# Patient Record
Sex: MALE | Race: WHITE | NOT HISPANIC OR LATINO | Employment: OTHER | URBAN - NONMETROPOLITAN AREA
[De-identification: names, ages, dates, MRNs, and addresses within clinical notes are randomized per-mention and may not be internally consistent; named-entity substitution may affect disease eponyms.]

---

## 2018-11-09 LAB
ALBUMIN SERPL-MCNC: 4 G/DL (ref 3.5–5.2)
ALP BLD-CCNC: 48 U/L (ref 40–130)
ALT SERPL-CCNC: 21 U/L (ref 5–41)
ANION GAP SERPL CALCULATED.3IONS-SCNC: 11 MMOL/L (ref 7–19)
AST SERPL-CCNC: 20 U/L (ref 5–40)
BACTERIA: NEGATIVE /HPF
BASOPHILS ABSOLUTE: 0.1 K/UL (ref 0–0.2)
BASOPHILS RELATIVE PERCENT: 1.1 % (ref 0–1)
BILIRUB SERPL-MCNC: 0.7 MG/DL (ref 0.2–1.2)
BILIRUBIN URINE: NEGATIVE
BLOOD, URINE: ABNORMAL
BUN BLDV-MCNC: 23 MG/DL (ref 8–23)
CALCIUM SERPL-MCNC: 10.4 MG/DL (ref 8.8–10.2)
CHLORIDE BLD-SCNC: 100 MMOL/L (ref 98–111)
CHOLESTEROL, TOTAL: 138 MG/DL (ref 160–199)
CLARITY: CLEAR
CO2: 30 MMOL/L (ref 22–29)
COLOR: YELLOW
CREAT SERPL-MCNC: 1.1 MG/DL (ref 0.5–1.2)
EOSINOPHILS ABSOLUTE: 0.2 K/UL (ref 0–0.6)
EOSINOPHILS RELATIVE PERCENT: 3.4 % (ref 0–5)
EPITHELIAL CELLS, UA: 1 /HPF (ref 0–5)
GFR NON-AFRICAN AMERICAN: >60
GLUCOSE BLD-MCNC: 149 MG/DL (ref 74–109)
GLUCOSE URINE: >=1000 MG/DL
HBA1C MFR BLD: 8.2 % (ref 4–6)
HCT VFR BLD CALC: 48.1 % (ref 42–52)
HDLC SERPL-MCNC: 40 MG/DL (ref 55–121)
HEMOGLOBIN: 15.3 G/DL (ref 14–18)
HYALINE CASTS: 1 /HPF (ref 0–8)
KETONES, URINE: NEGATIVE MG/DL
LDL CHOLESTEROL CALCULATED: 69 MG/DL
LEUKOCYTE ESTERASE, URINE: NEGATIVE
LYMPHOCYTES ABSOLUTE: 1.9 K/UL (ref 1.1–4.5)
LYMPHOCYTES RELATIVE PERCENT: 33.8 % (ref 20–40)
MCH RBC QN AUTO: 29 PG (ref 27–31)
MCHC RBC AUTO-ENTMCNC: 31.8 G/DL (ref 33–37)
MCV RBC AUTO: 91.3 FL (ref 80–94)
MONOCYTES ABSOLUTE: 0.7 K/UL (ref 0–0.9)
MONOCYTES RELATIVE PERCENT: 12.6 % (ref 0–10)
NEUTROPHILS ABSOLUTE: 2.7 K/UL (ref 1.5–7.5)
NEUTROPHILS RELATIVE PERCENT: 48.9 % (ref 50–65)
NITRITE, URINE: NEGATIVE
PDW BLD-RTO: 13.9 % (ref 11.5–14.5)
PH UA: 7
PLATELET # BLD: 285 K/UL (ref 130–400)
PMV BLD AUTO: 9.4 FL (ref 9.4–12.4)
POTASSIUM SERPL-SCNC: 4.5 MMOL/L (ref 3.5–5)
PROTEIN UA: NEGATIVE MG/DL
RBC # BLD: 5.27 M/UL (ref 4.7–6.1)
RBC UA: 7 /HPF (ref 0–4)
SODIUM BLD-SCNC: 141 MMOL/L (ref 136–145)
SPECIFIC GRAVITY UA: 1.03
TOTAL PROTEIN: 7.4 G/DL (ref 6.6–8.7)
TRIGL SERPL-MCNC: 147 MG/DL (ref 0–149)
TSH SERPL DL<=0.05 MIU/L-ACNC: 3.84 UIU/ML (ref 0.27–4.2)
URIC ACID, SERUM: 4.7 MG/DL (ref 3.4–7)
UROBILINOGEN, URINE: 0.2 E.U./DL
WBC # BLD: 5.6 K/UL (ref 4.8–10.8)
WBC UA: 22 /HPF (ref 0–5)

## 2019-09-11 RX ORDER — LEVOTHYROXINE SODIUM 0.05 MG/1
50 TABLET ORAL DAILY
COMMUNITY
End: 2020-08-19 | Stop reason: SDUPTHER

## 2019-09-11 RX ORDER — GLIPIZIDE 5 MG/1
5 TABLET ORAL
COMMUNITY
End: 2020-08-13 | Stop reason: SDUPTHER

## 2019-09-11 RX ORDER — ASPIRIN 81 MG/1
81 TABLET ORAL DAILY
COMMUNITY

## 2019-09-11 RX ORDER — MULTIPLE VITAMINS W/ MINERALS TAB 9MG-400MCG
1 TAB ORAL DAILY
COMMUNITY

## 2019-09-11 RX ORDER — DUTASTERIDE 0.5 MG/1
0.5 CAPSULE, LIQUID FILLED ORAL DAILY
COMMUNITY

## 2019-09-11 RX ORDER — FUROSEMIDE 20 MG/1
20 TABLET ORAL EVERY OTHER DAY
COMMUNITY
End: 2020-09-15 | Stop reason: SDUPTHER

## 2019-09-11 RX ORDER — CHLORAL HYDRATE 500 MG
1000 CAPSULE ORAL
COMMUNITY

## 2019-09-11 RX ORDER — ATENOLOL 50 MG/1
50 TABLET ORAL DAILY
COMMUNITY
End: 2020-08-19 | Stop reason: SDUPTHER

## 2019-09-11 RX ORDER — AMPICILLIN TRIHYDRATE 250 MG
1000 CAPSULE ORAL DAILY
COMMUNITY

## 2019-09-11 RX ORDER — GABAPENTIN 100 MG/1
100 CAPSULE ORAL 3 TIMES DAILY
COMMUNITY
End: 2020-02-18 | Stop reason: SDUPTHER

## 2019-09-11 RX ORDER — TAMSULOSIN HYDROCHLORIDE 0.4 MG/1
1 CAPSULE ORAL 2 TIMES DAILY
COMMUNITY

## 2019-09-11 RX ORDER — AMLODIPINE BESYLATE 2.5 MG/1
2.5 TABLET ORAL DAILY
COMMUNITY
End: 2021-01-14 | Stop reason: SDUPTHER

## 2019-09-11 RX ORDER — ATORVASTATIN CALCIUM 20 MG/1
20 TABLET, FILM COATED ORAL DAILY
COMMUNITY
End: 2020-10-05 | Stop reason: SDUPTHER

## 2019-09-16 ENCOUNTER — OFFICE VISIT (OUTPATIENT)
Dept: CARDIOLOGY | Facility: CLINIC | Age: 84
End: 2019-09-16

## 2019-09-16 VITALS
HEIGHT: 74 IN | BODY MASS INDEX: 27.46 KG/M2 | WEIGHT: 214 LBS | OXYGEN SATURATION: 98 % | SYSTOLIC BLOOD PRESSURE: 134 MMHG | DIASTOLIC BLOOD PRESSURE: 80 MMHG | HEART RATE: 67 BPM

## 2019-09-16 DIAGNOSIS — E78.2 MIXED HYPERLIPIDEMIA: ICD-10-CM

## 2019-09-16 DIAGNOSIS — I10 ESSENTIAL HYPERTENSION: ICD-10-CM

## 2019-09-16 DIAGNOSIS — I25.10 CORONARY ARTERY DISEASE INVOLVING NATIVE CORONARY ARTERY OF NATIVE HEART WITHOUT ANGINA PECTORIS: Primary | ICD-10-CM

## 2019-09-16 PROBLEM — E78.5 HYPERLIPIDEMIA: Status: ACTIVE | Noted: 2019-09-16

## 2019-09-16 PROBLEM — I70.90 ARTERIOLOSCLEROSIS: Status: ACTIVE | Noted: 2019-09-16

## 2019-09-16 PROCEDURE — 99204 OFFICE O/P NEW MOD 45 MIN: CPT | Performed by: INTERNAL MEDICINE

## 2019-09-16 PROCEDURE — 93000 ELECTROCARDIOGRAM COMPLETE: CPT | Performed by: INTERNAL MEDICINE

## 2019-09-16 NOTE — PROGRESS NOTES
Subjective:     Encounter Date:09/16/2019      Patient ID: Sarkis Naranjo is a 83 y.o. male with coronary artery disease, status post remote history of PCI to the diagonal branch, also with hypertension, hyperlipidemia, type 2 diabetes mellitus who presents today to establish care.    Referring Provider: Meño Harris MD  Reason for Referral: Establish care - history of coronary artery disease    Chief Complaint: Establish care    Coronary Artery Disease   Presents for initial visit. The disease course has been stable. Pertinent negatives include no chest pain, dizziness, leg swelling, palpitations or shortness of breath. Risk factors include diabetes, hyperlipidemia and hypertension. Past treatments include aspirin, beta blockers, statins and calcium channel blockers. The treatment provided significant relief. Compliance with prior treatments has been good. There is no history of arrhythmia, CHF or valvular heart disease. Past surgical history includes cardiac stents. Past surgical history does not include CABG.      This is an 83-year-old male with coronary artery disease, status post remote history of PCI to the diagonal branch, also with hypertension, hyperlipidemia, diabetes mellitus who presents today to establish care.  Previous, the patient was followed by Dr. John Broadbent.  Patient says that he has been doing well for quite some time.  After his PCI a number of years ago, he had 2 separate heart catheterizations, both of which did not require any further interventions.  He remains chest pain-free at this time.  He remains physically active with no significant limitations.  No significant shortness of breath or dyspnea on exertion.  No palpitations, lightheadedness, dizziness, syncope.  He reports that his blood pressure is generally well controlled as well as his cholesterol well controlled.  He has not had any side effects or problems with his medications.    The following portions of the  patient's history were reviewed and updated as appropriate: allergies, current medications, past family history, past medical history, past social history, past surgical history and problem list.     Past Medical History:   Diagnosis Date   • Arteriolosclerosis    • Diabetes mellitus (CMS/HCC)    • Hyperlipidemia    • Hypertension    • Myocardial infarction (CMS/HCC)    • Neuropathy    • Tachycardia    • Urothelial carcinoma (CMS/HCC)      Past Surgical History:   Procedure Laterality Date   • CARDIAC CATHETERIZATION     • CARPAL TUNNEL RELEASE Bilateral    • CATARACT EXTRACTION, BILATERAL Bilateral    • HERNIA REPAIR     • TOTAL HIP ARTHROPLASTY Bilateral    • TRANSURETHRAL RESECTION OF BLADDER  09/24/2018       Current Outpatient Medications:   •  amLODIPine (NORVASC) 2.5 MG tablet, Take 2.5 mg by mouth Daily., Disp: , Rfl:   •  aspirin 81 MG EC tablet, Take 81 mg by mouth Daily., Disp: , Rfl:   •  atenolol (TENORMIN) 50 MG tablet, Take 50 mg by mouth Daily., Disp: , Rfl:   •  atorvastatin (LIPITOR) 20 MG tablet, Take 20 mg by mouth Daily., Disp: , Rfl:   •  Calcium Carb-Cholecalciferol 250-125 MG-UNIT tablet, Take 1 tablet by mouth Daily., Disp: , Rfl:   •  Canagliflozin (INVOKANA) 300 MG tablet, Take 300 mg by mouth Daily., Disp: , Rfl:   •  Cinnamon 500 MG capsule, Take 1,000 mg by mouth Daily., Disp: , Rfl:   •  dutasteride (AVODART) 0.5 MG capsule, Take 0.5 mg by mouth Daily., Disp: , Rfl:   •  furosemide (LASIX) 20 MG tablet, Take 20 mg by mouth Every Other Day., Disp: , Rfl:   •  gabapentin (NEURONTIN) 100 MG capsule, Take 100 mg by mouth 3 (Three) Times a Day. 1 TAB IN AM & 2 AT HS, Disp: , Rfl:   •  glipiZIDE (GLUCOTROL) 5 MG tablet, Take 5 mg by mouth 2 (Two) Times a Day Before Meals. 2 tabs AM 1 tab PM, Disp: , Rfl:   •  levothyroxine (SYNTHROID, LEVOTHROID) 50 MCG tablet, Take 50 mcg by mouth Daily., Disp: , Rfl:   •  Liraglutide (VICTOZA) 18 MG/3ML solution pen-injector injection, Inject 1.8 mg under  the skin into the appropriate area as directed Daily., Disp: , Rfl:   •  metFORMIN (GLUCOPHAGE) 1000 MG tablet, Take 1,000 mg by mouth 2 (Two) Times a Day With Meals., Disp: , Rfl:   •  Multiple Vitamins-Minerals (MULTIVITAMIN WITH MINERALS) tablet tablet, Take 1 tablet by mouth Daily., Disp: , Rfl:   •  Omega-3 Fatty Acids (FISH OIL) 1000 MG capsule capsule, Take 1,000 mg by mouth Daily With Breakfast., Disp: , Rfl:   •  tamsulosin (FLOMAX) 0.4 MG capsule 24 hr capsule, Take 1 capsule by mouth 2 (Two) Times a Day., Disp: , Rfl:     No Known Allergies    Social History     Tobacco Use   • Smoking status: Never Smoker   • Smokeless tobacco: Never Used   Substance Use Topics   • Alcohol use: No     Frequency: Never     Family History   Problem Relation Age of Onset   • Cancer Mother         brain tumor   • Hypertension Father    • Stroke Paternal Grandfather      Review of Systems   Constitution: Negative for chills, fever, night sweats and weight loss.   HENT: Negative for congestion and hearing loss.    Eyes: Negative for blurred vision and pain.   Cardiovascular: Negative for chest pain, claudication, dyspnea on exertion, irregular heartbeat, leg swelling, orthopnea, palpitations, paroxysmal nocturnal dyspnea and syncope.   Respiratory: Negative for cough, hemoptysis, shortness of breath and wheezing.    Endocrine: Negative for cold intolerance, heat intolerance, polydipsia and polyuria.   Hematologic/Lymphatic: Negative for adenopathy and bleeding problem. Does not bruise/bleed easily.   Skin: Negative for color change, poor wound healing and rash.   Musculoskeletal: Negative for arthritis, back pain, joint pain, joint swelling, myalgias and neck pain.   Gastrointestinal: Negative for abdominal pain, change in bowel habit, constipation, diarrhea, heartburn, hematochezia, melena, nausea and vomiting.   Genitourinary: Negative for dysuria, frequency, hematuria and nocturia.   Neurological: Negative for dizziness,  focal weakness, headaches, light-headedness, loss of balance and numbness.   Psychiatric/Behavioral: Negative for altered mental status, memory loss and substance abuse.   Allergic/Immunologic: Negative for hives and persistent infections.       ECG 12 Lead  Date/Time: 9/16/2019 1:59 PM  Performed by: Ezekiel Heath MD  Authorized by: Ezekiel Heath MD   Comparison: compared with previous ECG from 2/6/2014  Comparison to previous ECG: Decreased HR and increased SD compared to previous ECG  Rhythm: sinus rhythm  Rate: normal  BPM: 66  Conduction: 1st degree AV block  ST Segments: ST segments normal  T Waves: T waves normal  QRS axis: normal  Other: no other findings    Clinical impression: non-specific ECG             Objective:     Physical Exam   Constitutional: He is oriented to person, place, and time. Vital signs are normal. He appears well-developed and well-nourished. He is cooperative.  Non-toxic appearance. No distress.   HENT:   Head: Normocephalic and atraumatic.   Right Ear: External ear normal.   Left Ear: External ear normal.   Nose: Nose normal.   Mouth/Throat: Uvula is midline, oropharynx is clear and moist and mucous membranes are normal. Mucous membranes are not pale, not dry and not cyanotic. No oropharyngeal exudate.   Eyes: EOM and lids are normal. Pupils are equal, round, and reactive to light.   Neck: Normal range of motion. Neck supple. No hepatojugular reflux and no JVD present. Carotid bruit is not present. No tracheal deviation and no edema present. No thyroid mass and no thyromegaly present.   Cardiovascular: Normal rate, regular rhythm, S1 normal, S2 normal, normal heart sounds, intact distal pulses and normal pulses.  No extrasystoles are present. PMI is not displaced. Exam reveals no gallop and no friction rub.   No murmur heard.  Pulses:       Radial pulses are 2+ on the right side, and 2+ on the left side.        Femoral pulses are 2+ on the right side, and 2+ on the  "left side.       Dorsalis pedis pulses are 2+ on the right side, and 2+ on the left side.        Posterior tibial pulses are 2+ on the right side, and 2+ on the left side.   Pulmonary/Chest: Effort normal and breath sounds normal. No accessory muscle usage. No respiratory distress. He has no wheezes. He has no rales. He exhibits no tenderness.   Abdominal: Soft. Normal appearance and bowel sounds are normal. He exhibits no distension, no abdominal bruit and no pulsatile midline mass. There is no hepatosplenomegaly. There is no tenderness.   Musculoskeletal: Normal range of motion. He exhibits no edema, tenderness or deformity.   Lymphadenopathy:     He has no cervical adenopathy.   Neurological: He is oriented to person, place, and time. He has normal strength. No cranial nerve deficit.   Skin: Skin is warm, dry and intact. No rash noted. He is not diaphoretic. No cyanosis or erythema. Nails show no clubbing.   Psychiatric: He has a normal mood and affect. His speech is normal and behavior is normal. Thought content normal.   Vitals reviewed.    /80 (BP Location: Left arm, Patient Position: Sitting)   Pulse 67   Ht 188 cm (74\")   Wt 97.1 kg (214 lb)   SpO2 98%   BMI 27.48 kg/m²     Data/Lab Review:     Records reviewed indicate that patient underwent PCI to the diagonal in 2010, had cardiac cath 2014 (no additional PCI at that time).    Cardiac Cath 2014:  - LMCA: Short, no sig disease  - LAD: 1 diagonal with previous stent and 20% stenosis, while main LAD had minimal disease  - Lcx: 4 Om branches noted - minimal disease  - RCA: Dominant, engaged with AL-2, mild diseae        Assessment:          Diagnosis Plan   1. Coronary artery disease involving native coronary artery of native heart without angina pectoris  ECG 12 Lead   2. Essential hypertension     3. Mixed hyperlipidemia            Plan:       1.  Coronary artery disease: The patient is stable at this time.  He did have a history of PCI to the " diagonal branch in 2010 but his most recent cardiac catheterization showed widely patent stents in the diagonal.  He remains on aspirin, statin, beta-blocker therapies and does not describe any ischemic symptoms at this time.    2.  Essential hypertension: The patient's blood pressure remains well controlled.  Continue current medications.    3.  Mixed hyperlipidemia: The patient remains on statin therapy and he is tolerating this well.  His lipids are followed by Dr. Harris's office.    Patient's Body mass index is 27.48 kg/m². BMI is above normal parameters. Recommendations include: exercise counseling and nutrition counseling.    Follow-up: 12 months unless otherwise needed sooner.

## 2020-02-18 DIAGNOSIS — G62.9 NEUROPATHY: Primary | ICD-10-CM

## 2020-02-18 RX ORDER — GABAPENTIN 100 MG/1
100 CAPSULE ORAL 2 TIMES DAILY
Qty: 60 CAPSULE | Refills: 2 | Status: SHIPPED | OUTPATIENT
Start: 2020-02-18 | End: 2020-02-19 | Stop reason: SDUPTHER

## 2020-02-18 NOTE — TELEPHONE ENCOUNTER
Pt called in requesting refill:    Gabapentin 100MG, 2 capsules daily     Please send to [facility] in [place]. Per pt pharm has sent over several request and pt is almost out.

## 2020-02-19 DIAGNOSIS — G62.9 NEUROPATHY: ICD-10-CM

## 2020-02-19 RX ORDER — GABAPENTIN 100 MG/1
CAPSULE ORAL
Qty: 360 CAPSULE | Refills: 2 | Status: SHIPPED | OUTPATIENT
Start: 2020-02-19 | End: 2020-06-10 | Stop reason: SDUPTHER

## 2020-02-19 NOTE — TELEPHONE ENCOUNTER
Pt is requesting to have meds refilled  invocana gabapentin  Called in to [facility] on [address] in [place]

## 2020-03-04 ENCOUNTER — TELEPHONE (OUTPATIENT)
Dept: INTERNAL MEDICINE | Facility: CLINIC | Age: 85
End: 2020-03-04

## 2020-03-04 RX ORDER — PEN NEEDLE, DIABETIC 30 GX5/16"
1 NEEDLE, DISPOSABLE MISCELLANEOUS DAILY
Qty: 90 EACH | Refills: 3 | Status: SHIPPED | OUTPATIENT
Start: 2020-03-04

## 2020-03-04 NOTE — TELEPHONE ENCOUNTER
Patient wife called in requesting an urgent refill on the patient PEN needles for his Liraglutide (VICTOZA) 18 MG/3ML solution pen-injector injection.     Preferred pharmacy:[facility] [address]     For any questions or issues, please call patients wife at  [phone]

## 2020-03-10 PROBLEM — R00.0 TACHYCARDIA: Status: ACTIVE | Noted: 2020-03-10

## 2020-03-10 PROBLEM — R60.9 PERIPHERAL EDEMA: Status: ACTIVE | Noted: 2020-03-10

## 2020-03-10 PROBLEM — Z12.10 SCREENING FOR INTESTINAL CANCER: Status: ACTIVE | Noted: 2020-03-10

## 2020-03-10 PROBLEM — C68.9 UROTHELIAL CARCINOMA: Status: ACTIVE | Noted: 2020-03-10

## 2020-03-10 PROBLEM — K64.4 EXTERNAL HEMORRHOID: Status: ACTIVE | Noted: 2020-03-10

## 2020-03-10 PROBLEM — E03.9 ACQUIRED HYPOTHYROIDISM: Status: ACTIVE | Noted: 2018-11-14

## 2020-03-10 PROBLEM — R60.0 PERIPHERAL EDEMA: Status: ACTIVE | Noted: 2020-03-10

## 2020-03-10 PROBLEM — L97.929 ULCER OF LEFT LOWER EXTREMITY: Status: ACTIVE | Noted: 2020-03-10

## 2020-03-10 PROBLEM — G62.9 NEUROPATHY: Status: ACTIVE | Noted: 2020-03-10

## 2020-03-10 PROBLEM — N40.0 ENLARGED PROSTATE: Status: ACTIVE | Noted: 2020-03-10

## 2020-03-10 PROBLEM — E11.9 DIABETES MELLITUS TYPE 2, NONINSULIN DEPENDENT: Status: ACTIVE | Noted: 2020-03-10

## 2020-03-10 PROBLEM — Z95.5 H/O HEART ARTERY STENT: Status: ACTIVE | Noted: 2018-11-14

## 2020-03-10 PROBLEM — C67.9 BLADDER CANCER: Status: ACTIVE | Noted: 2018-11-14

## 2020-03-11 ENCOUNTER — OFFICE VISIT (OUTPATIENT)
Dept: INTERNAL MEDICINE | Facility: CLINIC | Age: 85
End: 2020-03-11

## 2020-03-11 ENCOUNTER — TELEPHONE (OUTPATIENT)
Dept: INTERNAL MEDICINE | Facility: CLINIC | Age: 85
End: 2020-03-11

## 2020-03-11 VITALS
RESPIRATION RATE: 16 BRPM | DIASTOLIC BLOOD PRESSURE: 76 MMHG | WEIGHT: 221.4 LBS | TEMPERATURE: 97.6 F | HEIGHT: 74 IN | OXYGEN SATURATION: 98 % | BODY MASS INDEX: 28.42 KG/M2 | SYSTOLIC BLOOD PRESSURE: 120 MMHG | HEART RATE: 64 BPM

## 2020-03-11 DIAGNOSIS — E11.9 DIABETES MELLITUS TYPE 2, NONINSULIN DEPENDENT (HCC): ICD-10-CM

## 2020-03-11 DIAGNOSIS — Z12.5 SCREENING PSA (PROSTATE SPECIFIC ANTIGEN): ICD-10-CM

## 2020-03-11 DIAGNOSIS — E78.2 MIXED HYPERLIPIDEMIA: ICD-10-CM

## 2020-03-11 DIAGNOSIS — I10 ESSENTIAL HYPERTENSION: Primary | ICD-10-CM

## 2020-03-11 DIAGNOSIS — G62.9 NEUROPATHY: ICD-10-CM

## 2020-03-11 PROBLEM — N40.1 BENIGN PROSTATIC HYPERPLASIA WITH URINARY OBSTRUCTION: Status: ACTIVE | Noted: 2017-04-13

## 2020-03-11 PROBLEM — R97.20 RAISED PROSTATE SPECIFIC ANTIGEN: Status: ACTIVE | Noted: 2017-04-13

## 2020-03-11 PROBLEM — R33.9 RETENTION OF URINE: Status: ACTIVE | Noted: 2019-01-24

## 2020-03-11 PROBLEM — N13.8 BENIGN PROSTATIC HYPERPLASIA WITH URINARY OBSTRUCTION: Status: ACTIVE | Noted: 2017-04-13

## 2020-03-11 LAB — HBA1C MFR BLD: 8.7 %

## 2020-03-11 PROCEDURE — 83036 HEMOGLOBIN GLYCOSYLATED A1C: CPT | Performed by: NURSE PRACTITIONER

## 2020-03-11 PROCEDURE — 99214 OFFICE O/P EST MOD 30 MIN: CPT | Performed by: NURSE PRACTITIONER

## 2020-03-11 RX ORDER — SULFAMETHOXAZOLE AND TRIMETHOPRIM 800; 160 MG/1; MG/1
1 TABLET ORAL EVERY 12 HOURS SCHEDULED
COMMUNITY
End: 2020-06-10

## 2020-03-11 NOTE — TELEPHONE ENCOUNTER
Patient's wife called in to need the lab changed to leave off the urine sample because there is a permanent catheter and his urologist takes care of all of his urinary issues. Dr. Arthur Harris stopped collecting his PSA samples about 3 or 4 years ago. Vianney just needs a new printout with the lab orders without the urine specimen included on it.  If any questions, please call.     Vianney Naranjo - [phone]    She would like those lab orders mailed to [address] [place] [address]

## 2020-03-11 NOTE — PROGRESS NOTES
Subjective   Sarkis Naranjo is a 84 y.o. male.   Chief Complaint   Patient presents with   • Follow-up       Sarkis presents with his wife today for a 3 month follow up on Diabetes.  They verbalize some concerns with the recent change in providers and seeing a nurse practitioner.  They feel he should be seeing an internal medicine doctor.  I have addressed these concerns and informed he is welcome to schedule with Dr. Harris for his follow up care.   He is due for a yearly physical and overdue for blood work.  They have agreed to get blood work done and plan to see Dr. Harris for his yearly exam in 3 months.     Sarkis is currently on several medications to manage his diabetes without use of insulin including Metformin, Glipizide, Invokanna, and Victoza.  His A1C today is 8.7.  They reports he has not been eating well.  He does get yearly eye exams which he last got around 8/2019.  He complains of neuropathy to the feet which has worsened.         The following portions of the patient's history were reviewed and updated as appropriate: allergies, current medications, past family history, past medical history, past social history, past surgical history and problem list.    Review of Systems   Constitutional: Negative for activity change, appetite change, fatigue, fever, unexpected weight gain and unexpected weight loss.   HENT: Negative for swollen glands, trouble swallowing and voice change.    Eyes: Negative for blurred vision and visual disturbance.   Respiratory: Negative for cough and shortness of breath.    Cardiovascular: Negative for chest pain, palpitations and leg swelling.   Gastrointestinal: Negative for abdominal pain, constipation, diarrhea, nausea, vomiting and indigestion.   Endocrine: Negative for cold intolerance, heat intolerance, polydipsia and polyphagia.   Genitourinary: Negative for dysuria and frequency.   Musculoskeletal: Negative for arthralgias, back pain, joint swelling and neck pain.    Skin: Negative for color change, rash and skin lesions.   Neurological: Negative for dizziness, weakness, headache, memory problem and confusion.        Paresthesias bilateral feet   Hematological: Does not bruise/bleed easily.   Psychiatric/Behavioral: Negative for agitation, hallucinations and suicidal ideas. The patient is not nervous/anxious.        Objective   Past Medical History:   Diagnosis Date   • Arteriolosclerosis    • Diabetes mellitus (CMS/HCC)    • Hyperlipidemia    • Hypertension    • Myocardial infarction (CMS/HCC)    • Neuropathy    • Tachycardia    • Urothelial carcinoma (CMS/HCC)       Past Surgical History:   Procedure Laterality Date   • CARDIAC CATHETERIZATION     • CARPAL TUNNEL RELEASE Bilateral    • CATARACT EXTRACTION, BILATERAL Bilateral    • HERNIA REPAIR     • TOTAL HIP ARTHROPLASTY Bilateral    • TRANSURETHRAL RESECTION OF BLADDER  09/24/2018        Current Outpatient Medications:   •  amLODIPine (NORVASC) 2.5 MG tablet, Take 2.5 mg by mouth Daily., Disp: , Rfl:   •  aspirin 81 MG EC tablet, Take 81 mg by mouth Daily., Disp: , Rfl:   •  atenolol (TENORMIN) 50 MG tablet, Take 50 mg by mouth Daily., Disp: , Rfl:   •  atorvastatin (LIPITOR) 20 MG tablet, Take 20 mg by mouth Daily., Disp: , Rfl:   •  Calcium Carb-Cholecalciferol 250-125 MG-UNIT tablet, Take 1 tablet by mouth Daily., Disp: , Rfl:   •  Canagliflozin (INVOKANA) 300 MG tablet, Take 300 mg by mouth Daily., Disp: 30 tablet, Rfl: 3  •  Cinnamon 500 MG capsule, Take 1,000 mg by mouth Daily., Disp: , Rfl:   •  dutasteride (AVODART) 0.5 MG capsule, Take 0.5 mg by mouth Daily., Disp: , Rfl:   •  furosemide (LASIX) 20 MG tablet, Take 20 mg by mouth Every Other Day., Disp: , Rfl:   •  gabapentin (NEURONTIN) 100 MG capsule, Take 2 capsules two times daily, Disp: 360 capsule, Rfl: 2  •  glipiZIDE (GLUCOTROL) 5 MG tablet, Take 5 mg by mouth 2 (Two) Times a Day Before Meals. 2 tabs AM 1 tab PM, Disp: , Rfl:   •  Insulin Pen Needle (PEN  "NEEDLES 3/16\") 31G X 5 MM misc, 1 each Daily., Disp: 90 each, Rfl: 3  •  levothyroxine (SYNTHROID, LEVOTHROID) 50 MCG tablet, Take 50 mcg by mouth Daily., Disp: , Rfl:   •  Liraglutide (VICTOZA) 18 MG/3ML solution pen-injector injection, Inject 1.8 mg under the skin into the appropriate area as directed Daily., Disp: , Rfl:   •  metFORMIN (GLUCOPHAGE) 1000 MG tablet, Take 1,000 mg by mouth 2 (Two) Times a Day With Meals., Disp: , Rfl:   •  Multiple Vitamins-Minerals (MULTIVITAMIN WITH MINERALS) tablet tablet, Take 1 tablet by mouth Daily., Disp: , Rfl:   •  Omega-3 Fatty Acids (FISH OIL) 1000 MG capsule capsule, Take 1,000 mg by mouth Daily With Breakfast., Disp: , Rfl:   •  tamsulosin (FLOMAX) 0.4 MG capsule 24 hr capsule, Take 1 capsule by mouth 2 (Two) Times a Day., Disp: , Rfl:   •  Liraglutide (VICTOZA) 18 MG/3ML solution pen-injector injection, Victoza 3-Tej 0.6 mg/0.1 mL (18 mg/3 mL) subcutaneous pen injector, Disp: , Rfl:   •  sulfamethoxazole-trimethoprim (BACTRIM DS) 800-160 MG per tablet, Take 1 mg by mouth Every 12 (Twelve) Hours., Disp: , Rfl:      Vitals:    03/11/20 1115   BP: 120/76   Pulse: 64   Resp: 16   Temp: 97.6 °F (36.4 °C)   SpO2: 98%         03/11/20  1115   Weight: 100 kg (221 lb 6.4 oz)     Patient's Body mass index is 28.43 kg/m². BMI is above normal parameters. Recommendations include: nutrition counseling.      Physical Exam   Constitutional: He is oriented to person, place, and time. He appears well-developed and well-nourished.   HENT:   Head: Normocephalic and atraumatic.   Right Ear: External ear normal.   Left Ear: External ear normal.   Nose: Nose normal.   Mouth/Throat: Oropharynx is clear and moist.   Eyes: Conjunctivae and EOM are normal.   Neck: Normal range of motion. Neck supple. No thyromegaly present.   Cardiovascular: Normal rate, regular rhythm, normal heart sounds and intact distal pulses.   Mild 1-2+ bilateral lower extremity edema.    Pulmonary/Chest: Effort normal " and breath sounds normal.   Genitourinary:   Genitourinary Comments: Urinary catheter in place.    Lymphadenopathy:     He has no cervical adenopathy.   Neurological: He is alert and oriented to person, place, and time.   Skin: Skin is warm and dry.   Skin to feet is intact, soft and well cared for.    Psychiatric: He has a normal mood and affect. His behavior is normal. Thought content normal.   Nursing note and vitals reviewed.        Assessment/Plan   Diagnoses and all orders for this visit:    1. Essential hypertension (Primary)  -     CBC & Differential; Future  -     Uric Acid; Future  -     TSH; Future  -     TSH  -     Uric Acid  -     CBC & Differential    2. Mixed hyperlipidemia  -     Lipid Panel; Future  -     Lipid Panel    3. Diabetes mellitus type 2, noninsulin dependent (CMS/HCC)  -     Comprehensive Metabolic Panel; Future  -     Urinalysis With Microscopic - Urine, Clean Catch; Future  -     Urinalysis With Microscopic - Urine, Clean Catch  -     Comprehensive Metabolic Panel    4. Neuropathy    5. Screening PSA (prostate specific antigen)  -     PSA SCREENING; Future  -     PSA SCREENING      Increase Glipizide to 10mg twice daily at this time. Monitor blood sugars and watch for lows on this medication.  Patient used to be on 10mg twice daily but started to have hypoglycemia.  He was reduced to 10mg in am, 5mg in pm.  Since then, his diet has worsened and A1C has increased.  I have informed him that with the exception of Glipizide, he is maxed out on his diabetic medication and they next step will be insulin.  He does not want to do this.  I have then requesting we do another trial of Glipizide 10mg bid and see how he does.  He will follow up with Dr. Harris in 3 months for his annual to recheck.

## 2020-03-11 NOTE — TELEPHONE ENCOUNTER
Pt called in stating that he received a call to reschedule his appointment from 11am to 2pm. Pt says he does not want to change his appointment. Pt has called in several times. Sending encounter and transferred Pt to office.    Pt Contact: [phone]

## 2020-03-11 NOTE — PATIENT INSTRUCTIONS

## 2020-03-18 ENCOUNTER — RESULTS ENCOUNTER (OUTPATIENT)
Dept: INTERNAL MEDICINE | Facility: CLINIC | Age: 85
End: 2020-03-18

## 2020-03-18 DIAGNOSIS — E78.2 MIXED HYPERLIPIDEMIA: ICD-10-CM

## 2020-03-18 DIAGNOSIS — E11.9 DIABETES MELLITUS TYPE 2, NONINSULIN DEPENDENT (HCC): ICD-10-CM

## 2020-03-18 DIAGNOSIS — I10 ESSENTIAL HYPERTENSION: ICD-10-CM

## 2020-03-20 ENCOUNTER — TELEPHONE (OUTPATIENT)
Dept: INTERNAL MEDICINE | Facility: CLINIC | Age: 85
End: 2020-03-20

## 2020-03-20 NOTE — TELEPHONE ENCOUNTER
PATIENT'S WIFE CALLED AND STATED THAT THEY WERE IN THE OFFICE ON 3/11 AND SHE SPOKE WITH MS JADE ON THE 12TH BECAUSE THEY WERE SUPPOSED TO BE MAILED LAB ORDERS BECAUSE THEY GO TO THE [facility], NOT THE IN OFFICE LAB; PATIENT WIFE TATIANA STATED THAT THEY HAVEN'T RECEIVED THOSE LABS AS OF YET IN THE MAIL. PLEASE MAIL LAB ORDERS TO FOLLOWING ADDRESS:    MIGDALIA LUZ  [address] [place] [address]    PLEASE CALL TO ADVISE PATIENT @ [phone]

## 2020-03-23 ENCOUNTER — TELEPHONE (OUTPATIENT)
Dept: INTERNAL MEDICINE | Facility: CLINIC | Age: 85
End: 2020-03-23

## 2020-03-23 DIAGNOSIS — E11.9 DIABETES MELLITUS TYPE 2, NONINSULIN DEPENDENT (HCC): Primary | ICD-10-CM

## 2020-03-23 RX ORDER — METFORMIN HYDROCHLORIDE 1000 MG/1
1000 TABLET, FILM COATED, EXTENDED RELEASE ORAL 2 TIMES DAILY WITH MEALS
Qty: 60 TABLET | Refills: 11 | Status: SHIPPED | OUTPATIENT
Start: 2020-03-23 | End: 2020-06-10 | Stop reason: SDUPTHER

## 2020-03-23 NOTE — TELEPHONE ENCOUNTER
Pt called in requesting refill:    Metformin 1000 MG    Please send to [facility]    Pt is completely out.

## 2020-03-23 NOTE — TELEPHONE ENCOUNTER
[facility] called stating pt has been on the extended release for Metformin. Please verify and send updated RX.

## 2020-06-05 LAB
ALBUMIN SERPL-MCNC: 4.3 G/DL (ref 3.5–5.2)
ALP BLD-CCNC: 62 U/L (ref 40–130)
ALT SERPL-CCNC: 20 U/L (ref 5–41)
ANION GAP SERPL CALCULATED.3IONS-SCNC: 14 MMOL/L (ref 7–19)
AST SERPL-CCNC: 19 U/L (ref 5–40)
BASOPHILS ABSOLUTE: 0.1 K/UL (ref 0–0.2)
BASOPHILS RELATIVE PERCENT: 1 % (ref 0–1)
BILIRUB SERPL-MCNC: 0.6 MG/DL (ref 0.2–1.2)
BUN BLDV-MCNC: 22 MG/DL (ref 8–23)
CALCIUM SERPL-MCNC: 9.6 MG/DL (ref 8.8–10.2)
CHLORIDE BLD-SCNC: 102 MMOL/L (ref 98–111)
CHOLESTEROL, TOTAL: 127 MG/DL (ref 160–199)
CO2: 25 MMOL/L (ref 22–29)
CREAT SERPL-MCNC: 1.1 MG/DL (ref 0.5–1.2)
EOSINOPHILS ABSOLUTE: 0.3 K/UL (ref 0–0.6)
EOSINOPHILS RELATIVE PERCENT: 4 % (ref 0–5)
GFR NON-AFRICAN AMERICAN: >60
GLUCOSE BLD-MCNC: 174 MG/DL (ref 74–109)
HBA1C MFR BLD: 8.1 % (ref 4–6)
HCT VFR BLD CALC: 47.8 % (ref 42–52)
HDLC SERPL-MCNC: 41 MG/DL (ref 55–121)
HEMOGLOBIN: 15.5 G/DL (ref 14–18)
IMMATURE GRANULOCYTES #: 0 K/UL
LDL CHOLESTEROL CALCULATED: 55 MG/DL
LYMPHOCYTES ABSOLUTE: 2.3 K/UL (ref 1.1–4.5)
LYMPHOCYTES RELATIVE PERCENT: 29.4 % (ref 20–40)
MCH RBC QN AUTO: 30 PG (ref 27–31)
MCHC RBC AUTO-ENTMCNC: 32.4 G/DL (ref 33–37)
MCV RBC AUTO: 92.6 FL (ref 80–94)
MONOCYTES ABSOLUTE: 0.9 K/UL (ref 0–0.9)
MONOCYTES RELATIVE PERCENT: 10.8 % (ref 0–10)
NEUTROPHILS ABSOLUTE: 4.4 K/UL (ref 1.5–7.5)
NEUTROPHILS RELATIVE PERCENT: 54.5 % (ref 50–65)
PDW BLD-RTO: 13.6 % (ref 11.5–14.5)
PLATELET # BLD: 273 K/UL (ref 130–400)
PMV BLD AUTO: 9.4 FL (ref 9.4–12.4)
POTASSIUM SERPL-SCNC: 4.8 MMOL/L (ref 3.5–5)
RBC # BLD: 5.16 M/UL (ref 4.7–6.1)
SODIUM BLD-SCNC: 141 MMOL/L (ref 136–145)
TOTAL PROTEIN: 7.6 G/DL (ref 6.6–8.7)
TRIGL SERPL-MCNC: 153 MG/DL (ref 0–149)
TSH SERPL DL<=0.05 MIU/L-ACNC: 4.9 UIU/ML (ref 0.27–4.2)
URIC ACID, SERUM: 4.7 MG/DL (ref 3.4–7)
WBC # BLD: 8 K/UL (ref 4.8–10.8)

## 2020-06-10 ENCOUNTER — OFFICE VISIT (OUTPATIENT)
Dept: INTERNAL MEDICINE | Facility: CLINIC | Age: 85
End: 2020-06-10

## 2020-06-10 ENCOUNTER — RESULTS ENCOUNTER (OUTPATIENT)
Dept: INTERNAL MEDICINE | Facility: CLINIC | Age: 85
End: 2020-06-10

## 2020-06-10 VITALS
HEIGHT: 74 IN | OXYGEN SATURATION: 98 % | WEIGHT: 216.6 LBS | HEART RATE: 71 BPM | BODY MASS INDEX: 27.8 KG/M2 | TEMPERATURE: 98.4 F | SYSTOLIC BLOOD PRESSURE: 132 MMHG | DIASTOLIC BLOOD PRESSURE: 74 MMHG

## 2020-06-10 DIAGNOSIS — Z12.11 COLON CANCER SCREENING: ICD-10-CM

## 2020-06-10 DIAGNOSIS — G62.9 NEUROPATHY: ICD-10-CM

## 2020-06-10 DIAGNOSIS — I25.10 CORONARY ARTERY DISEASE INVOLVING NATIVE CORONARY ARTERY OF NATIVE HEART WITHOUT ANGINA PECTORIS: Primary | ICD-10-CM

## 2020-06-10 DIAGNOSIS — Z23 NEED FOR VACCINATION WITH 13-POLYVALENT PNEUMOCOCCAL CONJUGATE VACCINE: ICD-10-CM

## 2020-06-10 DIAGNOSIS — E11.9 DIABETES MELLITUS TYPE 2, NONINSULIN DEPENDENT (HCC): ICD-10-CM

## 2020-06-10 DIAGNOSIS — I10 ESSENTIAL HYPERTENSION: ICD-10-CM

## 2020-06-10 PROCEDURE — G0009 ADMIN PNEUMOCOCCAL VACCINE: HCPCS | Performed by: INTERNAL MEDICINE

## 2020-06-10 PROCEDURE — G0439 PPPS, SUBSEQ VISIT: HCPCS | Performed by: INTERNAL MEDICINE

## 2020-06-10 PROCEDURE — 90670 PCV13 VACCINE IM: CPT | Performed by: INTERNAL MEDICINE

## 2020-06-10 RX ORDER — LIRAGLUTIDE 6 MG/ML
1 INJECTION SUBCUTANEOUS DAILY
COMMUNITY
Start: 2020-06-04 | End: 2021-03-26 | Stop reason: SDUPTHER

## 2020-06-10 RX ORDER — GABAPENTIN 300 MG/1
300 CAPSULE ORAL 2 TIMES DAILY
Qty: 60 CAPSULE | Refills: 2 | Status: SHIPPED | OUTPATIENT
Start: 2020-06-10 | End: 2020-10-05

## 2020-06-10 NOTE — PROGRESS NOTES
The ABCs of the Annual Wellness Visit  Subsequent Medicare Wellness Visit    Chief Complaint   Patient presents with   • Medicare Wellness-subsequent   • Diabetic neuropathy     c/o medication for neuropathy doesn't seem to help.       Subjective   History of Present Illness:  Sarkis Naranjo is a 84 y.o. male who presents for a Subsequent Medicare Wellness Visit.    HEALTH RISK ASSESSMENT    Recent Hospitalizations:  No hospitalization(s) within the last year.    Current Medical Providers:  Patient Care Team:  Christophe Harris MD as PCP - General (Internal Medicine)  Ezekiel Heath MD as Cardiologist (Cardiology)  Meño Harris MD as Referring Physician (Internal Medicine)    Smoking Status:  Social History     Tobacco Use   Smoking Status Never Smoker   Smokeless Tobacco Never Used       Alcohol Consumption:  Social History     Substance and Sexual Activity   Alcohol Use No   • Frequency: Never       Depression Screen:   PHQ-2/PHQ-9 Depression Screening 6/10/2020   Little interest or pleasure in doing things 0   Feeling down, depressed, or hopeless 0   Total Score 0       Fall Risk Screen:  MARYJANE Fall Risk Assessment was completed, and patient is at LOW risk for falls.Assessment completed on:6/10/2020    Health Habits and Functional and Cognitive Screening:  Functional & Cognitive Status 6/10/2020   Do you have difficulty preparing food and eating? No   Do you have difficulty bathing yourself, getting dressed or grooming yourself? No   Do you have difficulty using the toilet? No   Do you have difficulty moving around from place to place? No   Do you have trouble with steps or getting out of a bed or a chair? No   Current Diet Well Balanced Diet   Dental Exam Up to date   Eye Exam Up to date   Exercise (times per week) 3 times per week   Current Exercise Activities Include Yard Work   Do you need help using the phone?  No   Are you deaf or do you have serious difficulty hearing?  No   Do you need  help with transportation? No   Do you need help shopping? No   Do you need help preparing meals?  No   Do you need help with housework?  No   Do you need help with laundry? No   Do you need help taking your medications? No   Do you need help managing money? No   Do you ever drive or ride in a car without wearing a seat belt? No   Have you felt unusual stress, anger or loneliness in the last month? No   Who do you live with? Spouse   If you need help, do you have trouble finding someone available to you? No   Do you have difficulty concentrating, remembering or making decisions? No         Does the patient have evidence of cognitive impairment? No    Asprin use counseling:Taking ASA appropriately as indicated    Age-appropriate Screening Schedule:  Refer to the list below for future screening recommendations based on patient's age, sex and/or medical conditions. Orders for these recommended tests are listed in the plan section. The patient has been provided with a written plan.    Health Maintenance   Topic Date Due   • URINE MICROALBUMIN  1935   • TDAP/TD VACCINES (1 - Tdap) 10/13/1947   • ZOSTER VACCINE (1 of 2) 10/13/1986   • LIPID PANEL  11/09/2019   • INFLUENZA VACCINE  08/01/2020   • HEMOGLOBIN A1C  09/11/2020   • DIABETIC EYE EXAM  05/25/2021          The following portions of the patient's history were reviewed and updated as appropriate: allergies, current medications, past family history, past medical history, past social history, past surgical history and problem list.    Outpatient Medications Prior to Visit   Medication Sig Dispense Refill   • amLODIPine (NORVASC) 2.5 MG tablet Take 2.5 mg by mouth Daily.     • aspirin 81 MG EC tablet Take 81 mg by mouth Daily.     • atenolol (TENORMIN) 50 MG tablet Take 50 mg by mouth Daily.     • atorvastatin (LIPITOR) 20 MG tablet Take 20 mg by mouth Daily.     • Calcium Carb-Cholecalciferol 250-125 MG-UNIT tablet Take 1 tablet by mouth Daily.     •  "Canagliflozin (INVOKANA) 300 MG tablet Take 300 mg by mouth Daily. 30 tablet 11   • Cinnamon 500 MG capsule Take 1,000 mg by mouth Daily.     • dutasteride (AVODART) 0.5 MG capsule Take 0.5 mg by mouth Daily.     • furosemide (LASIX) 20 MG tablet Take 20 mg by mouth Every Other Day.     • gabapentin (NEURONTIN) 100 MG capsule Take 2 capsules two times daily (Patient taking differently: 2 qam, 1 qpm) 360 capsule 2   • glipiZIDE (GLUCOTROL) 5 MG tablet Take 5 mg by mouth. 2 tabs AM 1 tab PM     • levothyroxine (SYNTHROID, LEVOTHROID) 50 MCG tablet Take 50 mcg by mouth Daily.     • metFORMIN (GLUCOPHAGE) 1000 MG tablet Take 1 tablet by mouth 2 (Two) Times a Day With Meals. 60 tablet 11   • Multiple Vitamins-Minerals (MULTIVITAMIN WITH MINERALS) tablet tablet Take 1 tablet by mouth Daily.     • Omega-3 Fatty Acids (FISH OIL) 1000 MG capsule capsule Take 1,000 mg by mouth Daily With Breakfast.     • tamsulosin (FLOMAX) 0.4 MG capsule 24 hr capsule Take 1 capsule by mouth 2 (Two) Times a Day.     • VICTOZA 18 MG/3ML solution pen-injector injection Inject 1 each under the skin into the appropriate area as directed Daily.     • metFORMIN (GLUMETZA) 1000 MG (MOD) 24 hr tablet Take 1 tablet by mouth 2 (Two) Times a Day With Meals. 60 tablet 11   • Insulin Pen Needle (PEN NEEDLES 3/16\") 31G X 5 MM misc 1 each Daily. 90 each 3   • sulfamethoxazole-trimethoprim (BACTRIM DS) 800-160 MG per tablet Take 1 mg by mouth Every 12 (Twelve) Hours.       No facility-administered medications prior to visit.        Patient Active Problem List   Diagnosis   • Coronary artery disease involving native coronary artery of native heart without angina pectoris   • Hypertension   • Hyperlipidemia   • Acquired hypothyroidism   • Diabetes mellitus type 2, noninsulin dependent (CMS/HCC)   • Enlarged prostate   • H/O heart artery stent   • External hemorrhoid   • Neuropathy   • Peripheral edema   • Urothelial carcinoma (CMS/HCC)   • Screening for " "intestinal cancer   • Tachycardia   • Ulcer of left lower extremity (CMS/HCC)   • Bladder cancer (CMS/HCC)   • Benign prostatic hyperplasia with urinary obstruction   • Raised prostate specific antigen   • Retention of urine       Advanced Care Planning:  ACP discussion was held with the patient during this visit. Patient has an advance directive (not in EMR), copy requested.    Review of Systems   Constitutional: Negative for activity change, appetite change and chills.   HENT: Negative for congestion, ear pain and facial swelling.    Eyes: Negative for pain, discharge and itching.   Respiratory: Negative for apnea, cough and shortness of breath.    Cardiovascular: Negative for chest pain, palpitations and leg swelling.   Gastrointestinal: Negative for abdominal distention, abdominal pain and anal bleeding.   Endocrine: Negative for cold intolerance and heat intolerance.   Genitourinary: Negative for difficulty urinating, dysuria and flank pain.   Musculoskeletal: Negative for arthralgias, back pain and joint swelling.   Skin: Negative for color change, pallor and rash.   Allergic/Immunologic: Negative for environmental allergies and food allergies.   Neurological: Positive for numbness. Negative for dizziness and facial asymmetry.        Pain in both feet   Hematological: Negative for adenopathy. Does not bruise/bleed easily.   Psychiatric/Behavioral: Negative for agitation, behavioral problems and confusion.       Compared to one year ago, the patient feels his physical health is the same.  Compared to one year ago, the patient feels his mental health is the same.    Reviewed chart for potential of high risk medication in the elderly: yes  Reviewed chart for potential of harmful drug interactions in the elderly:yes    Objective         Vitals:    06/10/20 1137   BP: 132/74   BP Location: Left arm   Pulse: 71   Temp: 98.4 °F (36.9 °C)   SpO2: 98%   Weight: 98.2 kg (216 lb 9.6 oz)   Height: 188 cm (74\")   PainSc:   " 8   PainLoc: Foot       Body mass index is 27.81 kg/m².  Discussed the patient's BMI with him. The BMI is above average; BMI management plan is completed.    Physical Exam   Constitutional: He is oriented to person, place, and time. He appears well-developed and well-nourished.   HENT:   Head: Normocephalic and atraumatic.   Mouth/Throat: Oropharynx is clear and moist.   Eyes: Pupils are equal, round, and reactive to light. EOM are normal.   Neck: Normal range of motion. Neck supple.   Cardiovascular: Normal rate and regular rhythm.   Pulmonary/Chest: Effort normal and breath sounds normal.   Abdominal: Soft. Bowel sounds are normal.   Genitourinary:   Genitourinary Comments: Patient has an indwelling Light in place   Musculoskeletal: Normal range of motion.    Sarkis had a diabetic foot exam performed today.     Neurological: He is alert and oriented to person, place, and time.   Skin: Skin is warm and dry.   Psychiatric: He has a normal mood and affect. His behavior is normal.   Nursing note and vitals reviewed.      Lab Results   Component Value Date    CHLPL 127 (L) 06/05/2020    TRIG 153 (H) 06/05/2020    HDL 41 (L) 06/05/2020    LDL 55 06/05/2020    HGBA1C 8.1 (H) 06/05/2020        Assessment/Plan   Medicare Risks and Personalized Health Plan  CMS Preventative Services Quick Reference  Colon Cancer Screening  Diabetic Lab Screening   Immunizations Discussed/Encouraged (specific immunizations; adacel Tdap, Pneumococcal 23, Prevnar and Shingrix )  Obesity/Overweight     The above risks/problems have been discussed with the patient.  Pertinent information has been shared with the patient in the After Visit Summary.  Follow up plans and orders are seen below in the Assessment/Plan Section.    Diagnoses and all orders for this visit:    1. Coronary artery disease involving native coronary artery of native heart without angina pectoris (Primary)    2. Essential hypertension    3. Diabetes mellitus type 2,  noninsulin dependent (CMS/HCC)      Follow Up:  No follow-ups on file.     An After Visit Summary and PPPS were given to the patient.       We spent some time discussing his diabetes he has been off his diet recently so I am encouraging diet and losing some weight.  He is never had immunizations so we spent some time talking about his risk.  I would consider him having both Pneumovax Tdap and Shingrix he is agreeable to start the Prevnar 13 today.  In regard to his neuropathy that seems to be a big issue for him and is causing some difficulty with his gait I am increasing his Neurontin to 300 mg twice daily.  Also discussed the possibility of him using skilled therapy to improve his ambulation in the future.

## 2020-06-15 RX ORDER — CANAGLIFLOZIN 300 MG/1
TABLET, FILM COATED ORAL
Qty: 30 TABLET | Refills: 5 | Status: SHIPPED | OUTPATIENT
Start: 2020-06-15 | End: 2021-01-25

## 2020-08-13 DIAGNOSIS — E11.9 DIABETES MELLITUS TYPE 2, NONINSULIN DEPENDENT (HCC): Primary | ICD-10-CM

## 2020-08-13 RX ORDER — GLIPIZIDE 5 MG/1
5 TABLET ORAL 3 TIMES DAILY
Qty: 90 TABLET | Refills: 5 | Status: SHIPPED | OUTPATIENT
Start: 2020-08-13 | End: 2021-01-12

## 2020-08-13 NOTE — TELEPHONE ENCOUNTER
[facility] called to request a refill on the following medication: glipiZIDE (GLUCOTROL) 5 MG tablet. States PT is completely out of the med at this time.     Confirmed Pharmacy:   [place] - [address] [place] [address] - [phone]  - [phone] [address] [address]  Phone: [phone] Fax: [phone]

## 2020-08-19 RX ORDER — ATENOLOL 50 MG/1
50 TABLET ORAL DAILY
Qty: 30 TABLET | Refills: 11 | Status: SHIPPED | OUTPATIENT
Start: 2020-08-19 | End: 2021-08-17

## 2020-08-19 RX ORDER — LEVOTHYROXINE SODIUM 0.05 MG/1
50 TABLET ORAL DAILY
Qty: 30 TABLET | Refills: 11 | Status: SHIPPED | OUTPATIENT
Start: 2020-08-19 | End: 2021-08-17

## 2020-08-19 NOTE — TELEPHONE ENCOUNTER
Caller: [facility] [address] [facility] [phone] [place] [phone] FX    Relationship: Pharmacy    Best call back number: [phone]    Medication needed:   Requested Prescriptions     Pending Prescriptions Disp Refills   • levothyroxine (SYNTHROID, LEVOTHROID) 50 MCG tablet       Sig: Take 1 tablet by mouth Daily.   • atenolol (TENORMIN) 50 MG tablet       Sig: Take 1 tablet by mouth Daily.       What is the patient's preferred pharmacy: [facility] [place] [id number] [place] [phone] [place] [phone] FX

## 2020-09-15 ENCOUNTER — TELEPHONE (OUTPATIENT)
Dept: INTERNAL MEDICINE | Facility: CLINIC | Age: 85
End: 2020-09-15

## 2020-09-15 RX ORDER — FUROSEMIDE 20 MG/1
20 TABLET ORAL EVERY OTHER DAY
Qty: 90 TABLET | Refills: 3 | Status: SHIPPED | OUTPATIENT
Start: 2020-09-15 | End: 2021-01-12

## 2020-09-15 NOTE — TELEPHONE ENCOUNTER
PHARMACY STATES THEY NEED NEW SCRIPT FOR LASIX SINCE PATIENT IS NOW TAKING EVERY OTHER DAY AND IS REQUESTING 90 DAYS SUPPLY.

## 2020-09-21 ENCOUNTER — OFFICE VISIT (OUTPATIENT)
Dept: CARDIOLOGY | Facility: CLINIC | Age: 85
End: 2020-09-21

## 2020-09-21 VITALS
WEIGHT: 214 LBS | HEART RATE: 62 BPM | DIASTOLIC BLOOD PRESSURE: 70 MMHG | BODY MASS INDEX: 27.46 KG/M2 | HEIGHT: 74 IN | SYSTOLIC BLOOD PRESSURE: 114 MMHG | OXYGEN SATURATION: 98 %

## 2020-09-21 DIAGNOSIS — I25.10 CORONARY ARTERY DISEASE INVOLVING NATIVE CORONARY ARTERY OF NATIVE HEART WITHOUT ANGINA PECTORIS: Primary | ICD-10-CM

## 2020-09-21 DIAGNOSIS — I10 ESSENTIAL HYPERTENSION: ICD-10-CM

## 2020-09-21 DIAGNOSIS — E78.2 MIXED HYPERLIPIDEMIA: ICD-10-CM

## 2020-09-21 PROBLEM — R00.0 TACHYCARDIA: Status: RESOLVED | Noted: 2020-03-10 | Resolved: 2020-09-21

## 2020-09-21 PROCEDURE — 93000 ELECTROCARDIOGRAM COMPLETE: CPT | Performed by: INTERNAL MEDICINE

## 2020-09-21 PROCEDURE — 99214 OFFICE O/P EST MOD 30 MIN: CPT | Performed by: INTERNAL MEDICINE

## 2020-09-21 NOTE — PROGRESS NOTES
Subjective:     Encounter Date:09/21/2020      Patient ID: Sarkis Naranjo is a 84 y.o. male with coronary artery disease, status post remote history of PCI to the diagonal branch, also with hypertension, hyperlipidemia, type 2 diabetes mellitus who presents today for routine follow-up.    Chief Complaint: Follow-up    Coronary Artery Disease  Presents for follow-up visit. Pertinent negatives include no chest pain, dizziness, leg swelling, palpitations or shortness of breath. The symptoms have been stable. Compliance with diet is variable. Compliance with exercise is variable. Compliance with medications is good.   Hypertension  This is a chronic problem. The problem is unchanged. The problem is controlled. Pertinent negatives include no chest pain, headaches, orthopnea, palpitations, peripheral edema, PND or shortness of breath. Agents associated with hypertension include thyroid hormones. Risk factors for coronary artery disease include male gender, dyslipidemia and diabetes mellitus. Current antihypertension treatment includes calcium channel blockers and beta blockers. The current treatment provides significant improvement. There are no compliance problems.  Hypertensive end-organ damage includes CAD/MI. There is no history of angina.      This patient presents today for routine follow-up.  He has been doing well since we last saw him.  He has not had any recurrent chest pain.  He has no significant shortness of breath or dyspnea exertion other than mild symptoms that have been present for a number of years without increasing lately.  No orthopnea, PND, edema, palpitations.  No lightheadedness, dizziness or syncope.  Blood pressure has been well controlled.  Also, lipids have been well controlled.  The patient also notes that his diabetes has been under reasonable control.  He has not had any side effects many of his medications.  Overall, he says that he is doing well and feeling well at this  "time.      Current Outpatient Medications:   •  amLODIPine (NORVASC) 2.5 MG tablet, Take 2.5 mg by mouth Daily., Disp: , Rfl:   •  aspirin 81 MG EC tablet, Take 81 mg by mouth Daily., Disp: , Rfl:   •  atenolol (TENORMIN) 50 MG tablet, Take 1 tablet by mouth Daily., Disp: 30 tablet, Rfl: 11  •  atorvastatin (LIPITOR) 20 MG tablet, Take 20 mg by mouth Daily., Disp: , Rfl:   •  Calcium Carb-Cholecalciferol 250-125 MG-UNIT tablet, Take 1 tablet by mouth Daily., Disp: , Rfl:   •  Cinnamon 500 MG capsule, Take 1,000 mg by mouth Daily., Disp: , Rfl:   •  dutasteride (AVODART) 0.5 MG capsule, Take 0.5 mg by mouth Daily., Disp: , Rfl:   •  furosemide (LASIX) 20 MG tablet, Take 1 tablet by mouth Every Other Day., Disp: 90 tablet, Rfl: 3  •  gabapentin (NEURONTIN) 300 MG capsule, Take 1 capsule by mouth 2 (two) times a day., Disp: 60 capsule, Rfl: 2  •  glipizide (GLUCOTROL) 5 MG tablet, Take 1 tablet by mouth 3 (Three) Times a Day. 2 tabs AM 1 tab PM, Disp: 90 tablet, Rfl: 5  •  Insulin Pen Needle (PEN NEEDLES 3/16\") 31G X 5 MM misc, 1 each Daily., Disp: 90 each, Rfl: 3  •  INVOKANA 300 MG tablet, TAKE ONE TABLET BY MOUTH DAILY, Disp: 30 tablet, Rfl: 5  •  levothyroxine (SYNTHROID, LEVOTHROID) 50 MCG tablet, Take 1 tablet by mouth Daily., Disp: 30 tablet, Rfl: 11  •  metFORMIN (GLUCOPHAGE) 1000 MG tablet, Take 1 tablet by mouth 2 (Two) Times a Day With Meals., Disp: 60 tablet, Rfl: 11  •  Multiple Vitamins-Minerals (MULTIVITAMIN WITH MINERALS) tablet tablet, Take 1 tablet by mouth Daily., Disp: , Rfl:   •  Omega-3 Fatty Acids (FISH OIL) 1000 MG capsule capsule, Take 1,000 mg by mouth Daily With Breakfast., Disp: , Rfl:   •  tamsulosin (FLOMAX) 0.4 MG capsule 24 hr capsule, Take 1 capsule by mouth 2 (Two) Times a Day., Disp: , Rfl:   •  VICTOZA 18 MG/3ML solution pen-injector injection, Inject 1 each under the skin into the appropriate area as directed Daily., Disp: , Rfl:     No Known Allergies    Social History     Tobacco " Use   • Smoking status: Never Smoker   • Smokeless tobacco: Never Used   Substance Use Topics   • Alcohol use: No     Frequency: Never     Review of Systems   Constitution: Negative for fever and weight loss.   Cardiovascular: Negative for chest pain, dyspnea on exertion, leg swelling, orthopnea, palpitations, paroxysmal nocturnal dyspnea and syncope.   Respiratory: Negative for cough, shortness of breath and wheezing.    Hematologic/Lymphatic: Negative for bleeding problem. Does not bruise/bleed easily.   Gastrointestinal: Negative for abdominal pain, nausea and vomiting.   Neurological: Negative for dizziness, headaches and loss of balance.         ECG 12 Lead    Date/Time: 9/21/2020 4:16 PM  Performed by: Ezekiel Heath MD  Authorized by: Ezekiel Heath MD   Comparison: compared with previous ECG from 9/16/2019  Similar to previous ECG  Rhythm: sinus rhythm  Rate: normal  BPM: 69  Conduction: 1st degree AV block  ST Segments: ST segments normal  T Waves: T waves normal  QRS axis: normal  Other: no other findings    Clinical impression: non-specific ECG             Objective:     Vitals signs reviewed.   Constitutional:       General: Not in acute distress.     Appearance: Healthy appearance. Well-developed.   Eyes:      Extraocular Movements: Extraocular movements intact.      Pupils: Pupils are equal, round, and reactive to light.   HENT:      Head: Normocephalic and atraumatic.    Mouth/Throat:      Pharynx: Oropharynx is clear.   Neck:      Musculoskeletal: Normal range of motion and neck supple.      Thyroid: No thyroid mass.      Vascular: No JVD.   Pulmonary:      Effort: Pulmonary effort is normal.      Breath sounds: Normal breath sounds. No wheezing. No rhonchi. No rales.   Cardiovascular:      Normal rate. Regular rhythm.      Murmurs: There is no murmur.      No gallop.   Pulses:     Intact distal pulses.   Edema:     Peripheral edema absent.   Abdominal:      General: Bowel sounds  "are normal. There is no distension.      Palpations: Abdomen is soft.      Tenderness: There is no abdominal tenderness.   Musculoskeletal: Normal range of motion.      Extremities: No clubbing present.  Skin:     General: Skin is warm and dry. There is no cyanosis.      Findings: No erythema or rash.   Neurological:      Mental Status: Alert and oriented to person, place, and time.      Cranial Nerves: No cranial nerve deficit.       /70   Pulse 62   Ht 188 cm (74\")   Wt 97.1 kg (214 lb)   SpO2 98%   BMI 27.48 kg/m²     Data/Lab Review:     Lab Results   Component Value Date    CHLPL 127 (L) 06/05/2020    TRIG 153 (H) 06/05/2020    HDL 41 (L) 06/05/2020    LDL 55 06/05/2020     Cardiac Cath 2014:  - LMCA: Short, no sig disease  - LAD: 1 diagonal with previous stent and 20% stenosis, while main LAD had minimal disease  - Lcx: 4 Om branches noted - minimal disease  - RCA: Dominant, engaged with AL-2, mild diseae      Assessment:          Diagnosis Plan   1. Coronary artery disease involving native coronary artery of native heart without angina pectoris  ECG 12 Lead   2. Essential hypertension     3. Mixed hyperlipidemia          Plan:       1.  Coronary artery disease: The patient appears to be stable at this time and does not describe any ischemic symptoms.  He remains on aspirin, beta-blocker and statin therapies.  No changes at this time.    2.  Essential hypertension: Blood pressure remains well controlled on current medications therefore no changes are indicated at this time.    3.  Mixed hyperlipidemia: The patient's most recent lipid panel as referenced above, showing excellent control of LDL cholesterol on current dose of statin therapy.    Patient's Body mass index is 27.48 kg/m². BMI is above normal parameters. Recommendations include: exercise counseling and nutrition counseling.    Follow-up in 12 months unless otherwise needed sooner.           "

## 2020-10-05 DIAGNOSIS — G62.9 NEUROPATHY: ICD-10-CM

## 2020-10-05 RX ORDER — ATORVASTATIN CALCIUM 20 MG/1
20 TABLET, FILM COATED ORAL DAILY
Qty: 90 TABLET | Refills: 3 | Status: SHIPPED | OUTPATIENT
Start: 2020-10-05 | End: 2021-10-13 | Stop reason: SDUPTHER

## 2020-10-05 RX ORDER — GABAPENTIN 300 MG/1
CAPSULE ORAL
Qty: 60 CAPSULE | Refills: 5 | Status: SHIPPED | OUTPATIENT
Start: 2020-10-05 | End: 2021-04-28

## 2020-10-05 NOTE — TELEPHONE ENCOUNTER
[facility] [place] [facility] [address] [facility] [phone] [facility] [phone] FX    Relationship: Pharmacy    Best call back number: [phone]    Medication needed:   Requested Prescriptions     Pending Prescriptions Disp Refills   • atorvastatin (LIPITOR) 20 MG tablet        Sig: Take 1 tablet by mouth Daily.       When do you need the refill by: ASAP    What details did the patient provide when requesting the medication: PATIENT IS OUT OF MEDICATION     Does the patient have less than a 3 day supply:  [x] Yes  [] No    What is the patient's preferred pharmacy: [facility] [place] - [address] [facility] [phone] [facility] [phone] FX

## 2021-01-12 ENCOUNTER — OFFICE VISIT (OUTPATIENT)
Dept: INTERNAL MEDICINE | Facility: CLINIC | Age: 86
End: 2021-01-12

## 2021-01-12 VITALS
SYSTOLIC BLOOD PRESSURE: 148 MMHG | TEMPERATURE: 97.3 F | HEART RATE: 67 BPM | OXYGEN SATURATION: 98 % | HEIGHT: 74 IN | WEIGHT: 222 LBS | BODY MASS INDEX: 28.49 KG/M2 | DIASTOLIC BLOOD PRESSURE: 78 MMHG

## 2021-01-12 DIAGNOSIS — E11.9 ENCOUNTER FOR DIABETIC FOOT EXAM (HCC): ICD-10-CM

## 2021-01-12 DIAGNOSIS — E11.40 TYPE 2 DIABETES MELLITUS WITH DIABETIC NEUROPATHY, WITHOUT LONG-TERM CURRENT USE OF INSULIN (HCC): ICD-10-CM

## 2021-01-12 DIAGNOSIS — E11.9 DIABETES MELLITUS TYPE 2, NONINSULIN DEPENDENT (HCC): Primary | ICD-10-CM

## 2021-01-12 DIAGNOSIS — I10 ESSENTIAL HYPERTENSION: ICD-10-CM

## 2021-01-12 LAB — HBA1C MFR BLD: 8.8 %

## 2021-01-12 PROCEDURE — 83036 HEMOGLOBIN GLYCOSYLATED A1C: CPT | Performed by: INTERNAL MEDICINE

## 2021-01-12 PROCEDURE — 99214 OFFICE O/P EST MOD 30 MIN: CPT | Performed by: INTERNAL MEDICINE

## 2021-01-12 RX ORDER — GLIPIZIDE 10 MG/1
TABLET ORAL
Qty: 180 TABLET | Refills: 3 | Status: SHIPPED | OUTPATIENT
Start: 2021-01-12 | End: 2021-10-25

## 2021-01-12 NOTE — PROGRESS NOTES
Subjective   Sarkis Naranjo is a 85 y.o. male.   Chief Complaint   Patient presents with   • Hypertension     6 month follow up    • Diabetes     a1c:  8.8       History of Present Illness patient did not bring his readings in though he is checking them regularly running in the 1 6170 range.  He also is taking his medication as prescribed in regard to his blood pressure he said his blood pressure checked at other places he is apparently running lower than he is here today his weight has come up somewhat.    The following portions of the patient's history were reviewed and updated as appropriate: allergies, current medications, past family history, past medical history, past social history, past surgical history and problem list.    Review of Systems   Constitutional: Negative for activity change, appetite change, fatigue, fever, unexpected weight gain and unexpected weight loss.   HENT: Negative for swollen glands, trouble swallowing and voice change.    Eyes: Negative for blurred vision and visual disturbance.   Respiratory: Negative for cough and shortness of breath.    Cardiovascular: Negative for chest pain, palpitations and leg swelling.   Gastrointestinal: Negative for abdominal pain, constipation, diarrhea, nausea, vomiting and indigestion.   Endocrine: Negative for cold intolerance, heat intolerance, polydipsia and polyphagia.   Genitourinary: Negative for dysuria and frequency.   Musculoskeletal: Negative for arthralgias, back pain, joint swelling and neck pain.   Skin: Negative for color change, rash and skin lesions.   Neurological: Positive for numbness ( Patient has a dysesthesia in both feet). Negative for dizziness, weakness, headache, memory problem and confusion.   Hematological: Does not bruise/bleed easily.   Psychiatric/Behavioral: Negative for agitation, hallucinations and suicidal ideas. The patient is not nervous/anxious.        Objective   Past Medical History:   Diagnosis Date   •  "Arteriolosclerosis    • Diabetes mellitus (CMS/HCC)    • Hyperlipidemia    • Hypertension    • Myocardial infarction (CMS/HCC)    • Neuropathy    • Tachycardia    • Urothelial carcinoma (CMS/HCC)       Past Surgical History:   Procedure Laterality Date   • CARDIAC CATHETERIZATION     • CARPAL TUNNEL RELEASE Bilateral    • CATARACT EXTRACTION, BILATERAL Bilateral    • HERNIA REPAIR     • TOTAL HIP ARTHROPLASTY Bilateral    • TRANSURETHRAL RESECTION OF BLADDER  09/24/2018        Current Outpatient Medications:   •  amLODIPine (NORVASC) 2.5 MG tablet, Take 2.5 mg by mouth Daily., Disp: , Rfl:   •  aspirin 81 MG EC tablet, Take 81 mg by mouth Daily., Disp: , Rfl:   •  atenolol (TENORMIN) 50 MG tablet, Take 1 tablet by mouth Daily., Disp: 30 tablet, Rfl: 11  •  atorvastatin (LIPITOR) 20 MG tablet, Take 1 tablet by mouth Daily., Disp: 90 tablet, Rfl: 3  •  Calcium Carb-Cholecalciferol 250-125 MG-UNIT tablet, Take 1 tablet by mouth Daily., Disp: , Rfl:   •  Cinnamon 500 MG capsule, Take 1,000 mg by mouth Daily., Disp: , Rfl:   •  dutasteride (AVODART) 0.5 MG capsule, Take 0.5 mg by mouth Daily., Disp: , Rfl:   •  gabapentin (NEURONTIN) 300 MG capsule, TAKE ONE CAPSULE TWICE DAILY, Disp: 60 capsule, Rfl: 5  •  glipizide (GLUCOTROL) 10 MG tablet, 1 twice daily, Disp: 180 tablet, Rfl: 3  •  Insulin Pen Needle (PEN NEEDLES 3/16\") 31G X 5 MM misc, 1 each Daily., Disp: 90 each, Rfl: 3  •  INVOKANA 300 MG tablet, TAKE ONE TABLET BY MOUTH DAILY, Disp: 30 tablet, Rfl: 5  •  levothyroxine (SYNTHROID, LEVOTHROID) 50 MCG tablet, Take 1 tablet by mouth Daily., Disp: 30 tablet, Rfl: 11  •  metFORMIN (GLUCOPHAGE) 1000 MG tablet, Take 1 tablet by mouth 2 (Two) Times a Day With Meals., Disp: 60 tablet, Rfl: 11  •  Multiple Vitamins-Minerals (MULTIVITAMIN WITH MINERALS) tablet tablet, Take 1 tablet by mouth Daily., Disp: , Rfl:   •  Omega-3 Fatty Acids (FISH OIL) 1000 MG capsule capsule, Take 1,000 mg by mouth Daily With Breakfast., Disp: , " Rfl:   •  tamsulosin (FLOMAX) 0.4 MG capsule 24 hr capsule, Take 1 capsule by mouth 2 (Two) Times a Day., Disp: , Rfl:   •  VICTOZA 18 MG/3ML solution pen-injector injection, Inject 1 each under the skin into the appropriate area as directed Daily., Disp: , Rfl:      Vitals:    01/12/21 1302   BP: 148/78   Pulse: 67   Temp: 97.3 °F (36.3 °C)   SpO2: 98%         01/12/21  1302   Weight: 101 kg (222 lb)     Patient's Body mass index is 28.5 kg/m². BMI is .      Physical Exam  Vitals signs and nursing note reviewed.   Constitutional:       General: He is not in acute distress.     Appearance: Normal appearance. He is well-developed.   HENT:      Head: Normocephalic and atraumatic.      Right Ear: External ear normal.      Left Ear: External ear normal.      Nose: Nose normal.   Eyes:      Extraocular Movements: Extraocular movements intact.      Conjunctiva/sclera: Conjunctivae normal.      Pupils: Pupils are equal, round, and reactive to light.   Neck:      Musculoskeletal: Normal range of motion and neck supple. No neck rigidity or muscular tenderness.   Cardiovascular:      Rate and Rhythm: Normal rate and regular rhythm.      Pulses: Normal pulses.           Dorsalis pedis pulses are 2+ on the right side and 2+ on the left side.        Posterior tibial pulses are 2+ on the right side and 2+ on the left side.      Heart sounds: Normal heart sounds.   Pulmonary:      Effort: Pulmonary effort is normal.      Breath sounds: Normal breath sounds.   Abdominal:      General: Bowel sounds are normal.      Palpations: Abdomen is soft.   Musculoskeletal: Normal range of motion.   Feet:      Right foot:      Protective Sensation: 2 sites tested. 2 sites sensed.      Skin integrity: Skin integrity normal.      Left foot:      Protective Sensation: 2 sites tested. 2 sites sensed.      Skin integrity: Skin integrity normal.   Skin:     General: Skin is warm and dry.   Neurological:      General: No focal deficit present.       Mental Status: He is alert and oriented to person, place, and time.   Psychiatric:         Mood and Affect: Mood normal.         Behavior: Behavior normal.               Assessment/Plan   Diagnoses and all orders for this visit:    1. Diabetes mellitus type 2, noninsulin dependent (CMS/[facility]) (Primary)  -     POC Glycosylated Hemoglobin (Hb A1C)  -     glipizide (GLUCOTROL) 10 MG tablet; 1 twice daily  Dispense: 180 tablet; Refill: 3    2. Encounter for diabetic foot exam (CMS/[facility])  -     POC Glycosylated Hemoglobin (Hb A1C)    3. Essential hypertension    4. Type 2 diabetes mellitus with diabetic neuropathy, without long-term current use of insulin (CMS/[facility])    Patient's blood pressure is slightly elevated we talked about the need for weight reduction in that regard.  His A1c is above 7 he is in the eights and I am recommending he increase his Glucotrol to maximum dose of 10 twice daily.  If he does not come under good control we talked about replacing big toes with Trulicity or Ozempic.  We will see him back in 3 months for follow-up

## 2021-01-14 RX ORDER — AMLODIPINE BESYLATE 2.5 MG/1
2.5 TABLET ORAL DAILY
Qty: 90 TABLET | Refills: 3 | Status: SHIPPED | OUTPATIENT
Start: 2021-01-14 | End: 2021-10-13

## 2021-01-14 NOTE — TELEPHONE ENCOUNTER
Caller: [facility] [place] - [address] - [phone] [facility] [phone] FX    Relationship: Pharmacy    Best call back number:[phone]    Medication needed:   Requested Prescriptions     Pending Prescriptions Disp Refills   • amLODIPine (NORVASC) 2.5 MG tablet        Sig: Take 1 tablet by mouth Daily.     What is the patient's preferred pharmacy: [facility] [place], [place] - [address] [phone] [facility] [phone] FX

## 2021-01-25 RX ORDER — CANAGLIFLOZIN 300 MG/1
TABLET, FILM COATED ORAL
Qty: 90 TABLET | Refills: 3 | Status: SHIPPED | OUTPATIENT
Start: 2021-01-25 | End: 2021-05-05

## 2021-03-23 DIAGNOSIS — E11.9 DIABETES MELLITUS TYPE 2, NONINSULIN DEPENDENT (HCC): ICD-10-CM

## 2021-03-23 RX ORDER — METFORMIN HYDROCHLORIDE 1000 MG/1
TABLET, FILM COATED, EXTENDED RELEASE ORAL
Qty: 60 TABLET | Refills: 0 | OUTPATIENT
Start: 2021-03-23

## 2021-03-26 RX ORDER — LIRAGLUTIDE 6 MG/ML
1.8 INJECTION SUBCUTANEOUS DAILY
Qty: 3 PEN | Refills: 3 | Status: SHIPPED | OUTPATIENT
Start: 2021-03-26 | End: 2021-05-05

## 2021-03-29 ENCOUNTER — TELEPHONE (OUTPATIENT)
Dept: INTERNAL MEDICINE | Facility: CLINIC | Age: 86
End: 2021-03-29

## 2021-03-29 RX ORDER — METFORMIN HYDROCHLORIDE 1000 MG/1
1000 TABLET, FILM COATED, EXTENDED RELEASE ORAL 2 TIMES DAILY WITH MEALS
Qty: 180 TABLET | Refills: 3 | OUTPATIENT
Start: 2021-03-29 | End: 2022-04-25 | Stop reason: SDUPTHER

## 2021-03-29 NOTE — TELEPHONE ENCOUNTER
Pharmacy called questioning if patient should be on extended release metformin or regular.  Will get back with them Monday after this is reviewed.

## 2021-03-29 NOTE — TELEPHONE ENCOUNTER
"RX ATTACHED/PENDED    Per [employer], he had been getting ER as well and pharmacist says it is in their system as \"Glumetza\", so gave verbal order to refill 90 days x 3.  Wife informed of this as well and apologized for the inconvenience.  "

## 2021-03-29 NOTE — TELEPHONE ENCOUNTER
"    Caller: erwin cornelius    Relationship to patient: Emergency Contact    Best call back number: [phone]      Patient is needing: John, wife , called and stated that the pharmacy said that the \"old metformin\" was called in for Sarkis. She states that they sat at the pharmacy and the pharmacist was on hold for over 30 min on Friday to try to get his meds corrected. She stated that he will run out tomorrow.    She is asking for someone to get this corrected with the pharmacy and call and let her know when it is fixed so that she can go pick it up.       [facility] [address] [phone] [facility] [phone] FX   "

## 2021-03-29 NOTE — TELEPHONE ENCOUNTER
[facility], so will call pharmacy about that today.

## 2021-04-28 DIAGNOSIS — G62.9 NEUROPATHY: ICD-10-CM

## 2021-04-28 RX ORDER — GABAPENTIN 300 MG/1
CAPSULE ORAL
Qty: 180 CAPSULE | Refills: 1 | Status: SHIPPED | OUTPATIENT
Start: 2021-04-28 | End: 2021-05-05 | Stop reason: SDUPTHER

## 2021-05-05 ENCOUNTER — OFFICE VISIT (OUTPATIENT)
Dept: INTERNAL MEDICINE | Facility: CLINIC | Age: 86
End: 2021-05-05

## 2021-05-05 VITALS
TEMPERATURE: 97.1 F | HEART RATE: 68 BPM | HEIGHT: 74 IN | WEIGHT: 217 LBS | BODY MASS INDEX: 27.85 KG/M2 | OXYGEN SATURATION: 98 % | DIASTOLIC BLOOD PRESSURE: 76 MMHG | SYSTOLIC BLOOD PRESSURE: 132 MMHG

## 2021-05-05 DIAGNOSIS — G62.9 NEUROPATHY: ICD-10-CM

## 2021-05-05 DIAGNOSIS — I10 ESSENTIAL HYPERTENSION: ICD-10-CM

## 2021-05-05 DIAGNOSIS — E78.2 MIXED HYPERLIPIDEMIA: ICD-10-CM

## 2021-05-05 DIAGNOSIS — E11.9 ENCOUNTER FOR DIABETIC FOOT EXAM (HCC): Primary | ICD-10-CM

## 2021-05-05 LAB — HBA1C MFR BLD: 8.5 %

## 2021-05-05 PROCEDURE — 83036 HEMOGLOBIN GLYCOSYLATED A1C: CPT | Performed by: INTERNAL MEDICINE

## 2021-05-05 PROCEDURE — 99214 OFFICE O/P EST MOD 30 MIN: CPT | Performed by: INTERNAL MEDICINE

## 2021-05-05 RX ORDER — DULAGLUTIDE 0.75 MG/.5ML
0.75 INJECTION, SOLUTION SUBCUTANEOUS
Qty: 12 PEN | Refills: 3 | Status: SHIPPED | OUTPATIENT
Start: 2021-05-05 | End: 2021-08-11 | Stop reason: DRUGHIGH

## 2021-05-05 RX ORDER — GABAPENTIN 300 MG/1
300 CAPSULE ORAL 3 TIMES DAILY
Qty: 270 CAPSULE | Refills: 1 | Status: SHIPPED | OUTPATIENT
Start: 2021-05-05 | End: 2021-10-11

## 2021-05-05 NOTE — PROGRESS NOTES
Subjective   Sarkis Naranjo is a 85 y.o. male.   Chief Complaint   Patient presents with   • Hypertension     3 month follow up   • Diabetes     a1c: 8.5   • Leg Pain     worsening leg and foot pain        History of Present Illness patient has hypertension and poorly controlled diabetes and worsening leg pains primarily in his feet.  He is a longstanding diabetic poorly controlled.    The following portions of the patient's history were reviewed and updated as appropriate: allergies, current medications, past family history, past medical history, past social history, past surgical history and problem list.    Review of Systems   Constitutional: Negative for activity change, appetite change, fatigue, fever, unexpected weight gain and unexpected weight loss.   HENT: Negative for swollen glands, trouble swallowing and voice change.    Eyes: Negative for blurred vision and visual disturbance.   Respiratory: Negative for cough and shortness of breath.    Cardiovascular: Negative for chest pain, palpitations and leg swelling.   Gastrointestinal: Negative for abdominal pain, constipation, diarrhea, nausea, vomiting and indigestion.   Endocrine: Negative for cold intolerance, heat intolerance, polydipsia and polyphagia.   Genitourinary: Negative for dysuria and frequency.   Musculoskeletal: Negative for arthralgias, back pain, joint swelling and neck pain.        Pain in both feet   Skin: Negative for color change, rash and skin lesions.   Neurological: Negative for dizziness, weakness, headache, memory problem and confusion.   Hematological: Does not bruise/bleed easily.   Psychiatric/Behavioral: Negative for agitation, hallucinations and suicidal ideas. The patient is not nervous/anxious.        Objective   Past Medical History:   Diagnosis Date   • Arteriolosclerosis    • Diabetes mellitus (CMS/HCC)    • Hyperlipidemia    • Hypertension    • Myocardial infarction (CMS/HCC)    • Neuropathy    • Tachycardia    •  "Urothelial carcinoma (CMS/HCC)       Past Surgical History:   Procedure Laterality Date   • CARDIAC CATHETERIZATION     • CARPAL TUNNEL RELEASE Bilateral    • CATARACT EXTRACTION, BILATERAL Bilateral    • HERNIA REPAIR     • TOTAL HIP ARTHROPLASTY Bilateral    • TRANSURETHRAL RESECTION OF BLADDER  09/24/2018        Current Outpatient Medications:   •  amLODIPine (NORVASC) 2.5 MG tablet, Take 1 tablet by mouth Daily., Disp: 90 tablet, Rfl: 3  •  aspirin 81 MG EC tablet, Take 81 mg by mouth Daily., Disp: , Rfl:   •  atenolol (TENORMIN) 50 MG tablet, Take 1 tablet by mouth Daily., Disp: 30 tablet, Rfl: 11  •  atorvastatin (LIPITOR) 20 MG tablet, Take 1 tablet by mouth Daily., Disp: 90 tablet, Rfl: 3  •  Calcium Carb-Cholecalciferol 250-125 MG-UNIT tablet, Take 1 tablet by mouth Daily., Disp: , Rfl:   •  Cinnamon 500 MG capsule, Take 1,000 mg by mouth Daily., Disp: , Rfl:   •  dutasteride (AVODART) 0.5 MG capsule, Take 0.5 mg by mouth Daily., Disp: , Rfl:   •  gabapentin (NEURONTIN) 300 MG capsule, Take 1 capsule by mouth 3 (Three) Times a Day., Disp: 270 capsule, Rfl: 1  •  glipizide (GLUCOTROL) 10 MG tablet, 1 twice daily, Disp: 180 tablet, Rfl: 3  •  Insulin Pen Needle (PEN NEEDLES 3/16\") 31G X 5 MM misc, 1 each Daily., Disp: 90 each, Rfl: 3  •  levothyroxine (SYNTHROID, LEVOTHROID) 50 MCG tablet, Take 1 tablet by mouth Daily., Disp: 30 tablet, Rfl: 11  •  metFORMIN (Glumetza) 1000 MG (MOD) 24 hr tablet, Take 1 tablet by mouth 2 (Two) Times a Day With Meals., Disp: 180 tablet, Rfl: 3  •  Multiple Vitamins-Minerals (MULTIVITAMIN WITH MINERALS) tablet tablet, Take 1 tablet by mouth Daily., Disp: , Rfl:   •  Omega-3 Fatty Acids (FISH OIL) 1000 MG capsule capsule, Take 1,000 mg by mouth Daily With Breakfast., Disp: , Rfl:   •  tamsulosin (FLOMAX) 0.4 MG capsule 24 hr capsule, Take 1 capsule by mouth 2 (Two) Times a Day., Disp: , Rfl:   •  Dulaglutide (Trulicity) 0.75 MG/0.5ML solution pen-injector, Inject 0.75 mg under " the skin into the appropriate area as directed Every 7 (Seven) Days., Disp: 12 pen, Rfl: 3     Vitals:    05/05/21 1348   BP: 132/76   Pulse: 68   Temp: 97.1 °F (36.2 °C)   SpO2: 98%         05/05/21  1348   Weight: 98.4 kg (217 lb)         Physical Exam  Vitals and nursing note reviewed.   Constitutional:       General: He is not in acute distress.     Appearance: Normal appearance. He is well-developed.   HENT:      Head: Normocephalic and atraumatic.      Right Ear: External ear normal.      Left Ear: External ear normal.      Nose: Nose normal.   Eyes:      Extraocular Movements: Extraocular movements intact.      Conjunctiva/sclera: Conjunctivae normal.      Pupils: Pupils are equal, round, and reactive to light.   Cardiovascular:      Rate and Rhythm: Normal rate and regular rhythm.      Pulses: Normal pulses.           Dorsalis pedis pulses are 2+ on the right side and 2+ on the left side.        Posterior tibial pulses are 2+ on the right side and 2+ on the left side.      Heart sounds: Normal heart sounds.   Pulmonary:      Effort: Pulmonary effort is normal.      Breath sounds: Normal breath sounds.   Abdominal:      General: Bowel sounds are normal.      Palpations: Abdomen is soft.   Musculoskeletal:         General: Normal range of motion.      Cervical back: Normal range of motion and neck supple. No rigidity. No muscular tenderness.   Feet:      Right foot:      Protective Sensation: 2 sites tested. 2 sites sensed.      Skin integrity: Skin integrity normal.      Left foot:      Protective Sensation: 2 sites tested. 2 sites sensed.      Skin integrity: Skin integrity normal.   Skin:     General: Skin is warm and dry.   Neurological:      General: No focal deficit present.      Mental Status: He is alert and oriented to person, place, and time.   Psychiatric:         Mood and Affect: Mood normal.         Behavior: Behavior normal.               Assessment/Plan   Diagnoses and all orders for this  visit:    1. Encounter for diabetic foot exam (CMS/[facility]) (Primary)  -     POC Glycosylated Hemoglobin (Hb A1C)    2. Neuropathy  -     gabapentin (NEURONTIN) 300 MG capsule; Take 1 capsule by mouth 3 (Three) Times a Day.  Dispense: 270 capsule; Refill: 1    3. Essential hypertension  -     Basic Metabolic Panel    4. Mixed hyperlipidemia  -     ALT  -     AST  -     Lipid Panel    Other orders  -     Dulaglutide (Trulicity) 0.75 MG/0.5ML solution pen-injector; Inject 0.75 mg under the skin into the appropriate area as directed Every 7 (Seven) Days.  Dispense: 12 pen; Refill: 3    Patient has quite a bit of pain in both feet this is a diabetic neuropathy I am going to increase his gabapentin to 3 times daily in regard to his blood pressure is okay his A1c remains above goal I am going to discontinue his Victoza as well as his Invokana.  I told him to start the Trulicity at 0.75 mg weekly I have asked him to call me back if he has trouble I am being bit aggressive on discontinuing some of his medications I do not want any drug interactions I am going to have him come back in 3 months but we may well make some adjustments in his Trulicity before he comes back.

## 2021-05-06 LAB
ALT SERPL-CCNC: 23 U/L (ref 1–41)
AST SERPL-CCNC: 18 U/L (ref 1–40)
BUN SERPL-MCNC: 18 MG/DL (ref 8–23)
BUN/CREAT SERPL: 17.6 (ref 7–25)
CALCIUM SERPL-MCNC: 10 MG/DL (ref 8.6–10.5)
CHLORIDE SERPL-SCNC: 98 MMOL/L (ref 98–107)
CHOLEST SERPL-MCNC: 145 MG/DL (ref 0–200)
CO2 SERPL-SCNC: 26.3 MMOL/L (ref 22–29)
CREAT SERPL-MCNC: 1.02 MG/DL (ref 0.76–1.27)
GLUCOSE SERPL-MCNC: 136 MG/DL (ref 65–99)
HDLC SERPL-MCNC: 37 MG/DL (ref 40–60)
LDLC SERPL CALC-MCNC: 72 MG/DL (ref 0–100)
POTASSIUM SERPL-SCNC: 4.3 MMOL/L (ref 3.5–5.2)
SODIUM SERPL-SCNC: 139 MMOL/L (ref 136–145)
TRIGL SERPL-MCNC: 216 MG/DL (ref 0–150)
VLDLC SERPL CALC-MCNC: 36 MG/DL (ref 5–40)

## 2021-05-10 ENCOUNTER — TELEPHONE (OUTPATIENT)
Dept: INTERNAL MEDICINE | Facility: CLINIC | Age: 86
End: 2021-05-10

## 2021-05-10 NOTE — TELEPHONE ENCOUNTER
Caller: Sarkis Naranjo    Relationship: Self    Best call back number: [phone] (H)    Medication needed:   Dulaglutide (Trulicity) 0.75 MG/0.5ML solution pen-injector 3O DAY SUPPLY   When do you need the refill by: NEEDS PRE AUTH   STATES THE INSURANCE HAS KICKED IT BACK OUT     REQUESTING DR MALONEY TO CALL THE INSURANCE COMPANY     What additional details did the patient provide when requesting the medication: NEED PRE AUTH SENT TO     Does the patient have less than a 3 day supply:  [x] Yes  [] No    What is the patient's preferred pharmacy:    [facility] [address] [phone] [facility] [phone]   [phone]

## 2021-05-10 NOTE — TELEPHONE ENCOUNTER
I already have a printed fax from the pharmacy, to request PA on covermymeds website, which I will be doing today or tomorrow.

## 2021-07-06 DIAGNOSIS — G62.9 NEUROPATHY: ICD-10-CM

## 2021-07-06 RX ORDER — GABAPENTIN 300 MG/1
300 CAPSULE ORAL 3 TIMES DAILY
Qty: 270 CAPSULE | Refills: 1 | OUTPATIENT
Start: 2021-07-06

## 2021-07-06 NOTE — TELEPHONE ENCOUNTER
Caller: erwin cornelius    Relationship: Emergency Contact    Best call back number: [phone]    Medication needed:   Requested Prescriptions     Pending Prescriptions Disp Refills   • gabapentin (NEURONTIN) 300 MG capsule 270 capsule 1     Sig: Take 1 capsule by mouth 3 (Three) Times a Day.     What is the patient's preferred pharmacy: [facility] [phone] [facility] [phone]

## 2021-08-11 ENCOUNTER — OFFICE VISIT (OUTPATIENT)
Dept: INTERNAL MEDICINE | Facility: CLINIC | Age: 86
End: 2021-08-11

## 2021-08-11 VITALS
BODY MASS INDEX: 28.88 KG/M2 | SYSTOLIC BLOOD PRESSURE: 120 MMHG | OXYGEN SATURATION: 99 % | WEIGHT: 225 LBS | DIASTOLIC BLOOD PRESSURE: 70 MMHG | HEART RATE: 66 BPM | HEIGHT: 74 IN | TEMPERATURE: 97.9 F

## 2021-08-11 DIAGNOSIS — I10 ESSENTIAL HYPERTENSION: ICD-10-CM

## 2021-08-11 DIAGNOSIS — E11.40 TYPE 2 DIABETES MELLITUS WITH DIABETIC NEUROPATHY, WITHOUT LONG-TERM CURRENT USE OF INSULIN (HCC): Primary | ICD-10-CM

## 2021-08-11 LAB — HBA1C MFR BLD: 9.6 %

## 2021-08-11 PROCEDURE — 83036 HEMOGLOBIN GLYCOSYLATED A1C: CPT | Performed by: INTERNAL MEDICINE

## 2021-08-11 PROCEDURE — 99214 OFFICE O/P EST MOD 30 MIN: CPT | Performed by: INTERNAL MEDICINE

## 2021-08-11 RX ORDER — DULAGLUTIDE 1.5 MG/.5ML
1.5 INJECTION, SOLUTION SUBCUTANEOUS WEEKLY
Qty: 4 PEN | Refills: 5 | Status: SHIPPED | OUTPATIENT
Start: 2021-08-11 | End: 2022-01-03

## 2021-08-11 NOTE — PROGRESS NOTES
Subjective   Sarkis Naranjo is a 85 y.o. male.   Chief Complaint   Patient presents with   • Hypertension   • Diabetes     a1c: 9.6       History of Present Illness patient is here today to follow-up on his diabetes.  He is also requesting a handicap sticker for his car due to diabetic neuropathy.  Today his A1c is 9.6 and after reviewing his last several years of A1c is this is the highest he has been.  Also he is being followed up with his hypertension his blood pressures under good control.    The following portions of the patient's history were reviewed and updated as appropriate: allergies, current medications, past family history, past medical history, past social history, past surgical history and problem list.    Review of Systems   Constitutional: Negative for activity change, appetite change, fatigue, fever, unexpected weight gain and unexpected weight loss.   HENT: Negative for swollen glands, trouble swallowing and voice change.    Eyes: Negative for blurred vision and visual disturbance.   Respiratory: Negative for cough and shortness of breath.    Cardiovascular: Negative for chest pain, palpitations and leg swelling.   Gastrointestinal: Negative for abdominal pain, constipation, diarrhea, nausea, vomiting and indigestion.   Endocrine: Negative for cold intolerance, heat intolerance, polydipsia and polyphagia.   Genitourinary: Negative for dysuria and frequency.   Musculoskeletal: Negative for arthralgias, back pain, joint swelling and neck pain.   Skin: Negative for color change, rash and skin lesions.   Neurological: Positive for numbness ( Feet). Negative for dizziness, weakness, headache, memory problem and confusion.   Hematological: Does not bruise/bleed easily.   Psychiatric/Behavioral: Negative for agitation, hallucinations and suicidal ideas. The patient is not nervous/anxious.        Objective   Past Medical History:   Diagnosis Date   • Arteriolosclerosis    • Diabetes mellitus (CMS/[facility])   "  • Hyperlipidemia    • Hypertension    • Myocardial infarction (CMS/HCC)    • Neuropathy    • Tachycardia    • Urothelial carcinoma (CMS/HCC)       Past Surgical History:   Procedure Laterality Date   • CARDIAC CATHETERIZATION     • CARPAL TUNNEL RELEASE Bilateral    • CATARACT EXTRACTION, BILATERAL Bilateral    • HERNIA REPAIR     • TOTAL HIP ARTHROPLASTY Bilateral    • TRANSURETHRAL RESECTION OF BLADDER  09/24/2018        Current Outpatient Medications:   •  amLODIPine (NORVASC) 2.5 MG tablet, Take 1 tablet by mouth Daily., Disp: 90 tablet, Rfl: 3  •  aspirin 81 MG EC tablet, Take 81 mg by mouth Daily., Disp: , Rfl:   •  atenolol (TENORMIN) 50 MG tablet, Take 1 tablet by mouth Daily., Disp: 30 tablet, Rfl: 11  •  atorvastatin (LIPITOR) 20 MG tablet, Take 1 tablet by mouth Daily., Disp: 90 tablet, Rfl: 3  •  Calcium Carb-Cholecalciferol 250-125 MG-UNIT tablet, Take 1 tablet by mouth Daily., Disp: , Rfl:   •  Cinnamon 500 MG capsule, Take 1,000 mg by mouth Daily., Disp: , Rfl:   •  dutasteride (AVODART) 0.5 MG capsule, Take 0.5 mg by mouth Daily., Disp: , Rfl:   •  gabapentin (NEURONTIN) 300 MG capsule, Take 1 capsule by mouth 3 (Three) Times a Day., Disp: 270 capsule, Rfl: 1  •  glipizide (GLUCOTROL) 10 MG tablet, 1 twice daily, Disp: 180 tablet, Rfl: 3  •  Insulin Pen Needle (PEN NEEDLES 3/16\") 31G X 5 MM misc, 1 each Daily., Disp: 90 each, Rfl: 3  •  levothyroxine (SYNTHROID, LEVOTHROID) 50 MCG tablet, Take 1 tablet by mouth Daily., Disp: 30 tablet, Rfl: 11  •  metFORMIN (Glumetza) 1000 MG (MOD) 24 hr tablet, Take 1 tablet by mouth 2 (Two) Times a Day With Meals., Disp: 180 tablet, Rfl: 3  •  Multiple Vitamins-Minerals (MULTIVITAMIN WITH MINERALS) tablet tablet, Take 1 tablet by mouth Daily., Disp: , Rfl:   •  Omega-3 Fatty Acids (FISH OIL) 1000 MG capsule capsule, Take 1,000 mg by mouth Daily With Breakfast., Disp: , Rfl:   •  tamsulosin (FLOMAX) 0.4 MG capsule 24 hr capsule, Take 1 capsule by mouth 2 (Two) " Times a Day., Disp: , Rfl:      Vitals:    08/11/21 1334   BP: 120/70   Pulse: 66   Temp: 97.9 °F (36.6 °C)   SpO2: 99%         08/11/21  1334   Weight: 102 kg (225 lb)         Physical Exam  Vitals and nursing note reviewed.   Constitutional:       General: He is not in acute distress.     Appearance: Normal appearance. He is well-developed.   HENT:      Head: Normocephalic and atraumatic.      Right Ear: External ear normal.      Left Ear: External ear normal.      Nose: Nose normal.   Eyes:      Extraocular Movements: Extraocular movements intact.      Conjunctiva/sclera: Conjunctivae normal.      Pupils: Pupils are equal, round, and reactive to light.   Cardiovascular:      Rate and Rhythm: Normal rate and regular rhythm.      Pulses: Normal pulses.      Heart sounds: Normal heart sounds.      Comments: Patient has good pulses in both feet  Pulmonary:      Effort: Pulmonary effort is normal.      Breath sounds: Normal breath sounds.   Abdominal:      General: Bowel sounds are normal.      Palpations: Abdomen is soft.   Musculoskeletal:         General: Normal range of motion.      Cervical back: Normal range of motion and neck supple. No rigidity. No muscular tenderness.   Skin:     General: Skin is warm and dry.   Neurological:      General: No focal deficit present.      Mental Status: He is alert and oriented to person, place, and time.   Psychiatric:         Mood and Affect: Mood normal.         Behavior: Behavior normal.               Assessment/Plan   Diagnoses and all orders for this visit:    1. Type 2 diabetes mellitus with diabetic neuropathy, without long-term current use of insulin (CMS/[facility]) (Primary)    2. Essential hypertension    Other orders  -     POC Glycosylated Hemoglobin (Hb A1C)  -     Dulaglutide (Trulicity) 1.5 MG/0.5ML solution pen-injector; Inject 1.5 mg under the skin into the appropriate area as directed 1 (One) Time Per Week.  Dispense: 4 pen; Refill: 5    Patient has poorly  controlled diabetic his A1c's are out of range this is the worst he has been in a couple of years.  We started him on Trulicity and apparently he began to eat more fruits tickly peaches which has contributed to elevated sugar readings.  I am going to increase his Trulicity to 1.5 we will bring him back in 3 months and reevaluate.  His blood pressure seems to be adequately controlled.  I did give him a handicap parking sticker today due to his inability to walk distances from peripheral neuropathy.

## 2021-08-17 RX ORDER — LEVOTHYROXINE SODIUM 0.05 MG/1
50 TABLET ORAL DAILY
Qty: 90 TABLET | Refills: 3 | Status: SHIPPED | OUTPATIENT
Start: 2021-08-17 | End: 2022-08-22 | Stop reason: SDUPTHER

## 2021-08-17 RX ORDER — ATENOLOL 50 MG/1
TABLET ORAL
Qty: 90 TABLET | Refills: 3 | Status: SHIPPED | OUTPATIENT
Start: 2021-08-17 | End: 2022-08-22 | Stop reason: SDUPTHER

## 2021-09-22 ENCOUNTER — OFFICE VISIT (OUTPATIENT)
Dept: CARDIOLOGY | Facility: CLINIC | Age: 86
End: 2021-09-22

## 2021-09-22 VITALS
HEART RATE: 67 BPM | OXYGEN SATURATION: 99 % | SYSTOLIC BLOOD PRESSURE: 134 MMHG | DIASTOLIC BLOOD PRESSURE: 70 MMHG | WEIGHT: 223 LBS | BODY MASS INDEX: 28.62 KG/M2 | HEIGHT: 74 IN

## 2021-09-22 DIAGNOSIS — E78.2 MIXED HYPERLIPIDEMIA: ICD-10-CM

## 2021-09-22 DIAGNOSIS — I10 ESSENTIAL HYPERTENSION: ICD-10-CM

## 2021-09-22 DIAGNOSIS — I25.10 CORONARY ARTERY DISEASE INVOLVING NATIVE CORONARY ARTERY OF NATIVE HEART WITHOUT ANGINA PECTORIS: Primary | ICD-10-CM

## 2021-09-22 PROCEDURE — 99214 OFFICE O/P EST MOD 30 MIN: CPT | Performed by: INTERNAL MEDICINE

## 2021-09-22 PROCEDURE — 93000 ELECTROCARDIOGRAM COMPLETE: CPT | Performed by: INTERNAL MEDICINE

## 2021-09-22 NOTE — PROGRESS NOTES
Subjective:     Encounter Date:09/22/2021      Patient ID: Sarkis Naranjo is a 85 y.o. male with coronary artery disease, status post remote history of PCI to the diagonal branch, also with hypertension, hyperlipidemia, type 2 diabetes mellitus who presents today for routine follow-up.    Chief Complaint: Here for routine follow-up    This patient presents today for routine follow-up.  He has history of coronary artery disease with remote history of PCI.  He has been doing very well from a cardiac standpoint.  He denies having any chest pain.  He also denies having any significant shortness of breath or dyspnea on exertion.  He does have some mild lower extremity edema which is stable at this time and may be even somewhat improved.  No orthopnea, PND.  Weight has been relatively stable.  Blood pressure is generally well controlled.  He also reports good control of his cholesterol.  No side effects from any medications.  Overall, he says that he seems to be doing well and feeling well at this time.    Coronary Artery Disease  Presents for follow-up visit. Symptoms include leg swelling. Pertinent negatives include no chest pain, dizziness, palpitations or shortness of breath. The symptoms have been stable. Compliance with diet is variable. Compliance with exercise is variable. Compliance with medications is good.   Hypertension  This is a chronic problem. The problem is unchanged. The problem is controlled. Associated symptoms include peripheral edema. Pertinent negatives include no chest pain, headaches, orthopnea, palpitations, PND or shortness of breath. Agents associated with hypertension include thyroid hormones. Risk factors for coronary artery disease include diabetes mellitus, dyslipidemia and male gender. Current antihypertension treatment includes beta blockers and calcium channel blockers. The current treatment provides significant improvement. There are no compliance problems.        Current Outpatient  "Medications:   •  amLODIPine (NORVASC) 2.5 MG tablet, Take 1 tablet by mouth Daily., Disp: 90 tablet, Rfl: 3  •  aspirin 81 MG EC tablet, Take 81 mg by mouth Daily., Disp: , Rfl:   •  atenolol (TENORMIN) 50 MG tablet, TAKE ONE TABLET BY MOUTH DAILY, Disp: 90 tablet, Rfl: 3  •  atorvastatin (LIPITOR) 20 MG tablet, Take 1 tablet by mouth Daily., Disp: 90 tablet, Rfl: 3  •  Calcium Carb-Cholecalciferol 250-125 MG-UNIT tablet, Take 1 tablet by mouth Daily., Disp: , Rfl:   •  Cinnamon 500 MG capsule, Take 1,000 mg by mouth Daily., Disp: , Rfl:   •  Dulaglutide (Trulicity) 1.5 MG/0.5ML solution pen-injector, Inject 1.5 mg under the skin into the appropriate area as directed 1 (One) Time Per Week., Disp: 4 pen, Rfl: 5  •  dutasteride (AVODART) 0.5 MG capsule, Take 0.5 mg by mouth Daily., Disp: , Rfl:   •  gabapentin (NEURONTIN) 300 MG capsule, Take 1 capsule by mouth 3 (Three) Times a Day., Disp: 270 capsule, Rfl: 1  •  glipizide (GLUCOTROL) 10 MG tablet, 1 twice daily (Patient taking differently: Take 10 mg by mouth 2 (Two) Times a Day Before Meals. 1 twice daily), Disp: 180 tablet, Rfl: 3  •  Insulin Pen Needle (PEN NEEDLES 3/16\") 31G X 5 MM misc, 1 each Daily., Disp: 90 each, Rfl: 3  •  levothyroxine (SYNTHROID, LEVOTHROID) 50 MCG tablet, Take 1 tablet by mouth Daily., Disp: 90 tablet, Rfl: 3  •  metFORMIN (Glumetza) 1000 MG (MOD) 24 hr tablet, Take 1 tablet by mouth 2 (Two) Times a Day With Meals., Disp: 180 tablet, Rfl: 3  •  Multiple Vitamins-Minerals (MULTIVITAMIN WITH MINERALS) tablet tablet, Take 1 tablet by mouth Daily., Disp: , Rfl:   •  Omega-3 Fatty Acids (FISH OIL) 1000 MG capsule capsule, Take 1,000 mg by mouth Daily With Breakfast., Disp: , Rfl:   •  tamsulosin (FLOMAX) 0.4 MG capsule 24 hr capsule, Take 1 capsule by mouth 2 (Two) Times a Day., Disp: , Rfl:   •  TURMERIC PO, Take 550 mg by mouth Daily., Disp: , Rfl:     No Known Allergies    Social History     Tobacco Use   • Smoking status: Never Smoker "   • Smokeless tobacco: Never Used   Substance Use Topics   • Alcohol use: Yes     Comment: occ     Review of Systems   Constitutional: Negative for fever and weight loss.   Cardiovascular: Positive for leg swelling. Negative for chest pain, dyspnea on exertion, orthopnea, palpitations, paroxysmal nocturnal dyspnea and syncope.   Respiratory: Negative for cough, shortness of breath and wheezing.    Hematologic/Lymphatic: Negative for adenopathy and bleeding problem.   Gastrointestinal: Negative for abdominal pain, nausea and vomiting.   Neurological: Negative for dizziness, headaches and loss of balance.       ECG 12 Lead    Date/Time: 9/22/2021 2:40 PM  Performed by: Ezekiel Heath MD  Authorized by: Ezekiel Heath MD   Comparison: compared with previous ECG from 9/21/2020  Similar to previous ECG  Rhythm: sinus rhythm  Rate: normal  BPM: 63  Conduction: 1st degree AV block  ST Segments: ST segments normal  T Waves: T waves normal  QRS axis: normal    Clinical impression: non-specific ECG             Objective:     Vitals reviewed.   Constitutional:       General: Not in acute distress.     Appearance: Healthy appearance. Well-developed.   Eyes:      Extraocular Movements: Extraocular movements intact.      Pupils: Pupils are equal, round, and reactive to light.   HENT:      Head: Normocephalic and atraumatic.   Neck:      Thyroid: No thyroid mass.      Vascular: No JVD.   Pulmonary:      Effort: Pulmonary effort is normal.      Breath sounds: Normal breath sounds. No wheezing. No rhonchi. No rales.   Cardiovascular:      Normal rate. Regular rhythm.      Murmurs: There is no murmur.      No gallop.   Pulses:     Intact distal pulses.   Edema:     Peripheral edema present.     Pretibial: bilateral trace edema of the pretibial area.     Ankle: bilateral 1+ edema of the ankle.  Abdominal:      General: Bowel sounds are normal. There is no distension.      Palpations: Abdomen is soft.      Tenderness:  "There is no abdominal tenderness.   Musculoskeletal: Normal range of motion.      Extremities: No clubbing present.     Cervical back: Normal range of motion and neck supple. Skin:     General: Skin is warm and dry. There is no cyanosis.      Findings: No erythema or rash.   Neurological:      Mental Status: Alert and oriented to person, place, and time.      Cranial Nerves: No cranial nerve deficit.       /70   Pulse 67   Ht 188 cm (74\")   Wt 101 kg (223 lb)   SpO2 99%   BMI 28.63 kg/m²     Data/Lab Review:     Lab Results   Component Value Date    CHLPL 145 05/05/2021    TRIG 216 (H) 05/05/2021    HDL 37 (L) 05/05/2021    LDL 72 05/05/2021     Cardiac Cath 2014:  - LMCA: Short, no sig disease  - LAD: 1 diagonal with previous stent and 20% stenosis, while main LAD had minimal disease  - LCx: 4 Om branches noted - minimal disease  - RCA: Dominant, engaged with AL-2, mild diseae      Assessment:          Diagnosis Plan   1. Coronary artery disease involving native coronary artery of native heart without angina pectoris  ECG 12 Lead   2. Essential hypertension     3. Mixed hyperlipidemia          Plan:       1.  Coronary artery disease: Stable at this time with no ischemic symptoms described by the patient.  Continue aspirin, beta-blocker, statin therapies.     2.  Essential hypertension: The patient's blood pressure remains well controlled at this time.  Continue amlodipine, atenolol.     3.  Mixed hyperlipidemia: Most recent lipid panel is referenced above.  LDL cholesterol near goal.  Continue statin therapy.     We will plan to see the patient back in 12 months unless otherwise needed sooner.       "

## 2021-10-09 DIAGNOSIS — G62.9 NEUROPATHY: ICD-10-CM

## 2021-10-11 RX ORDER — GABAPENTIN 300 MG/1
CAPSULE ORAL
Qty: 270 CAPSULE | Refills: 1 | Status: SHIPPED | OUTPATIENT
Start: 2021-10-11 | End: 2022-04-25 | Stop reason: SDUPTHER

## 2021-10-13 RX ORDER — AMLODIPINE BESYLATE 2.5 MG/1
TABLET ORAL
Qty: 90 TABLET | Refills: 3 | Status: SHIPPED | OUTPATIENT
Start: 2021-10-13 | End: 2022-10-21 | Stop reason: SDUPTHER

## 2021-10-13 NOTE — TELEPHONE ENCOUNTER
Pharmacy Name:  [facility] [address] [facility] - [phone] [facility] [phone]       Pharmacy representative name: WILLIAM    Pharmacy representative phone number: [phone]    What medication are you calling in regards to: REFILL OF orvastatin (LIPITOR) 20 MG tablet    What question does the pharmacy have: NONE    Who is the provider that prescribed the medication: SHREYA JADE    Additional notes:

## 2021-10-14 RX ORDER — ATORVASTATIN CALCIUM 20 MG/1
20 TABLET, FILM COATED ORAL DAILY
Qty: 90 TABLET | Refills: 3 | Status: SHIPPED | OUTPATIENT
Start: 2021-10-14 | End: 2022-10-21 | Stop reason: SDUPTHER

## 2021-10-25 DIAGNOSIS — E11.9 DIABETES MELLITUS TYPE 2, NONINSULIN DEPENDENT (HCC): ICD-10-CM

## 2021-10-25 RX ORDER — GLIPIZIDE 10 MG/1
TABLET ORAL
Qty: 180 TABLET | Refills: 3 | Status: SHIPPED | OUTPATIENT
Start: 2021-10-25 | End: 2022-04-06

## 2021-11-10 ENCOUNTER — OFFICE VISIT (OUTPATIENT)
Dept: INTERNAL MEDICINE | Facility: CLINIC | Age: 86
End: 2021-11-10

## 2021-11-10 VITALS
HEIGHT: 74 IN | OXYGEN SATURATION: 98 % | DIASTOLIC BLOOD PRESSURE: 62 MMHG | SYSTOLIC BLOOD PRESSURE: 124 MMHG | WEIGHT: 221 LBS | HEART RATE: 64 BPM | TEMPERATURE: 97.9 F | BODY MASS INDEX: 28.36 KG/M2

## 2021-11-10 DIAGNOSIS — E11.40 TYPE 2 DIABETES MELLITUS WITH DIABETIC NEUROPATHY, WITHOUT LONG-TERM CURRENT USE OF INSULIN (HCC): Primary | ICD-10-CM

## 2021-11-10 DIAGNOSIS — I10 PRIMARY HYPERTENSION: ICD-10-CM

## 2021-11-10 LAB — HBA1C MFR BLD: 10 %

## 2021-11-10 PROCEDURE — 99214 OFFICE O/P EST MOD 30 MIN: CPT | Performed by: INTERNAL MEDICINE

## 2021-11-10 PROCEDURE — 3046F HEMOGLOBIN A1C LEVEL >9.0%: CPT | Performed by: INTERNAL MEDICINE

## 2021-11-10 PROCEDURE — 83036 HEMOGLOBIN GLYCOSYLATED A1C: CPT | Performed by: INTERNAL MEDICINE

## 2021-11-10 NOTE — PROGRESS NOTES
Subjective   Sarkis Naranjo is a 86 y.o. male.   Chief Complaint   Patient presents with   • Hypertension   • Diabetes     A1C: 10.0   • Numbness     NUMBNESS, TINGLING AND PAIN IN LEGS AND FEET        History of Present Illness patient is here for follow-up of diabetes his A1c is 10 that is gone up since instituting Trulicity.  He apparently is still not adhering to diabetic diet he eats pie on a regular basis.    The following portions of the patient's history were reviewed and updated as appropriate: allergies, current medications, past family history, past medical history, past social history, past surgical history and problem list.    Review of Systems   Constitutional: Negative for activity change, appetite change, fatigue, fever, unexpected weight gain and unexpected weight loss.   HENT: Negative for swollen glands, trouble swallowing and voice change.    Eyes: Negative for blurred vision and visual disturbance.   Respiratory: Negative for cough and shortness of breath.    Cardiovascular: Negative for chest pain, palpitations and leg swelling.   Gastrointestinal: Negative for abdominal pain, constipation, diarrhea, nausea, vomiting and indigestion.   Endocrine: Negative for cold intolerance, heat intolerance, polydipsia and polyphagia.   Genitourinary: Negative for dysuria and frequency.   Musculoskeletal: Negative for arthralgias, back pain, joint swelling and neck pain.   Skin: Negative for color change, rash and skin lesions.   Neurological: Negative for dizziness, weakness, headache, memory problem and confusion.   Hematological: Does not bruise/bleed easily.   Psychiatric/Behavioral: Negative for agitation, hallucinations and suicidal ideas. The patient is not nervous/anxious.        Objective   Past Medical History:   Diagnosis Date   • Arteriolosclerosis    • Diabetes mellitus (HCC)    • Hyperlipidemia    • Hypertension    • Myocardial infarction (HCC)    • Neuropathy    • Tachycardia    • Urothelial  "carcinoma (HCC)       Past Surgical History:   Procedure Laterality Date   • CARDIAC CATHETERIZATION     • CARPAL TUNNEL RELEASE Bilateral    • CATARACT EXTRACTION, BILATERAL Bilateral    • HERNIA REPAIR     • TOTAL HIP ARTHROPLASTY Bilateral    • TRANSURETHRAL RESECTION OF BLADDER  09/24/2018        Current Outpatient Medications:   •  amLODIPine (NORVASC) 2.5 MG tablet, TAKE 1 TABLET DAILY., Disp: 90 tablet, Rfl: 3  •  aspirin 81 MG EC tablet, Take 81 mg by mouth Daily., Disp: , Rfl:   •  atenolol (TENORMIN) 50 MG tablet, TAKE ONE TABLET BY MOUTH DAILY, Disp: 90 tablet, Rfl: 3  •  atorvastatin (LIPITOR) 20 MG tablet, Take 1 tablet by mouth Daily., Disp: 90 tablet, Rfl: 3  •  Calcium Carb-Cholecalciferol 250-125 MG-UNIT tablet, Take 1 tablet by mouth Daily., Disp: , Rfl:   •  Cinnamon 500 MG capsule, Take 1,000 mg by mouth Daily., Disp: , Rfl:   •  Dulaglutide (Trulicity) 1.5 MG/0.5ML solution pen-injector, Inject 1.5 mg under the skin into the appropriate area as directed 1 (One) Time Per Week., Disp: 4 pen, Rfl: 5  •  dutasteride (AVODART) 0.5 MG capsule, Take 0.5 mg by mouth Daily., Disp: , Rfl:   •  gabapentin (NEURONTIN) 300 MG capsule, TAKE ONE CAPSULE THREE TIMES DAILY, Disp: 270 capsule, Rfl: 1  •  glipizide (GLUCOTROL) 10 MG tablet, TAKE 1 TABLET BY MOUTH TWICE DAILY., Disp: 180 tablet, Rfl: 3  •  Insulin Pen Needle (PEN NEEDLES 3/16\") 31G X 5 MM misc, 1 each Daily., Disp: 90 each, Rfl: 3  •  levothyroxine (SYNTHROID, LEVOTHROID) 50 MCG tablet, Take 1 tablet by mouth Daily., Disp: 90 tablet, Rfl: 3  •  metFORMIN (Glumetza) 1000 MG (MOD) 24 hr tablet, Take 1 tablet by mouth 2 (Two) Times a Day With Meals., Disp: 180 tablet, Rfl: 3  •  Multiple Vitamins-Minerals (MULTIVITAMIN WITH MINERALS) tablet tablet, Take 1 tablet by mouth Daily., Disp: , Rfl:   •  Omega-3 Fatty Acids (FISH OIL) 1000 MG capsule capsule, Take 1,000 mg by mouth Daily With Breakfast., Disp: , Rfl:   •  tamsulosin (FLOMAX) 0.4 MG capsule 24 " hr capsule, Take 1 capsule by mouth 2 (Two) Times a Day., Disp: , Rfl:   •  TURMERIC PO, Take 550 mg by mouth Daily., Disp: , Rfl:   •  Insulin Glargine (LANTUS SOLOSTAR) 100 UNIT/ML injection pen, Inject 10 Units under the skin into the appropriate area as directed Daily., Disp: 2 pen, Rfl: 5      Vitals:    11/10/21 1008   BP: 124/62   Pulse: 64   Temp: 97.9 °F (36.6 °C)   SpO2: 98%         11/10/21  1008   Weight: 100 kg (221 lb)       Body mass index is 28.37 kg/m².    Physical Exam  Vitals and nursing note reviewed.   Constitutional:       General: He is not in acute distress.     Appearance: Normal appearance. He is well-developed.   HENT:      Head: Normocephalic and atraumatic.      Right Ear: External ear normal.      Left Ear: External ear normal.      Nose: Nose normal.   Eyes:      Extraocular Movements: Extraocular movements intact.      Conjunctiva/sclera: Conjunctivae normal.      Pupils: Pupils are equal, round, and reactive to light.   Cardiovascular:      Rate and Rhythm: Normal rate and regular rhythm.      Pulses: Normal pulses.      Heart sounds: Normal heart sounds.   Pulmonary:      Effort: Pulmonary effort is normal.      Breath sounds: Normal breath sounds.   Abdominal:      General: Bowel sounds are normal.      Palpations: Abdomen is soft.   Musculoskeletal:         General: Normal range of motion.      Cervical back: Normal range of motion and neck supple. No rigidity. No muscular tenderness.   Skin:     General: Skin is warm and dry.   Neurological:      General: No focal deficit present.      Mental Status: He is alert and oriented to person, place, and time.   Psychiatric:         Mood and Affect: Mood normal.         Behavior: Behavior normal.               Assessment/Plan   Diagnoses and all orders for this visit:    1. Type 2 diabetes mellitus with diabetic neuropathy, without long-term current use of insulin (HCC) (Primary)  -     POC Glycated Hemoglobin, Total    2. Primary  hypertension    Other orders  -     Insulin Glargine (LANTUS SOLOSTAR) 100 UNIT/ML injection pen; Inject 10 Units under the skin into the appropriate area as directed Daily.  Dispense: 2 pen; Refill: 5        Patient has not been using insulin up until this point he is out of control both from a dietary standpoint and sugars and A1c.  He is not bring his readings in with him either I am going ahead and start him on Lantus 10 units daily have him come back short-term in 4weeks lectured him about bringing his readings in with him when he comes so that we can review those.  Also encouraged him not to eat sweets he is currently consuming more sugars then he should.

## 2021-12-08 ENCOUNTER — OFFICE VISIT (OUTPATIENT)
Dept: INTERNAL MEDICINE | Facility: CLINIC | Age: 86
End: 2021-12-08

## 2021-12-08 VITALS
HEIGHT: 74 IN | DIASTOLIC BLOOD PRESSURE: 70 MMHG | WEIGHT: 225 LBS | BODY MASS INDEX: 28.88 KG/M2 | TEMPERATURE: 96.8 F | OXYGEN SATURATION: 99 % | HEART RATE: 70 BPM | SYSTOLIC BLOOD PRESSURE: 132 MMHG

## 2021-12-08 DIAGNOSIS — E11.40 TYPE 2 DIABETES MELLITUS WITH DIABETIC NEUROPATHY, WITHOUT LONG-TERM CURRENT USE OF INSULIN (HCC): Primary | ICD-10-CM

## 2021-12-08 DIAGNOSIS — I10 PRIMARY HYPERTENSION: ICD-10-CM

## 2021-12-08 DIAGNOSIS — G62.9 NEUROPATHY: ICD-10-CM

## 2021-12-08 PROCEDURE — 99214 OFFICE O/P EST MOD 30 MIN: CPT | Performed by: INTERNAL MEDICINE

## 2021-12-08 NOTE — PROGRESS NOTES
Subjective   Sarkis Naranjo is a 86 y.o. male.   Chief Complaint   Patient presents with   • Diabetes     4 week follow up ; wants to discuss diabetic medication and exatly what he is to be taking        History of Present Illness patient is here to follow-up on his diabetes he did try starting insulin his last visit he did bring in readings which range anywhere from the 90s up to 200.  He seems to have a good understanding of how to take his insulin at this point.    The following portions of the patient's history were reviewed and updated as appropriate: allergies, current medications, past family history, past medical history, past social history, past surgical history and problem list.    Review of Systems   Constitutional: Negative for activity change, appetite change, fatigue, fever, unexpected weight gain and unexpected weight loss.   HENT: Negative for swollen glands, trouble swallowing and voice change.    Eyes: Negative for blurred vision and visual disturbance.   Respiratory: Negative for cough and shortness of breath.    Cardiovascular: Negative for chest pain, palpitations and leg swelling.   Gastrointestinal: Negative for abdominal pain, constipation, diarrhea, nausea, vomiting and indigestion.   Endocrine: Negative for cold intolerance, heat intolerance, polydipsia and polyphagia.   Genitourinary: Negative for dysuria and frequency.   Musculoskeletal: Negative for arthralgias, back pain, joint swelling and neck pain.   Skin: Negative for color change, rash and skin lesions.   Neurological: Negative for dizziness, weakness, headache, memory problem and confusion.   Hematological: Does not bruise/bleed easily.   Psychiatric/Behavioral: Negative for agitation, hallucinations and suicidal ideas. The patient is not nervous/anxious.        Objective   Past Medical History:   Diagnosis Date   • Arteriolosclerosis    • Diabetes mellitus (HCC)    • Hyperlipidemia    • Hypertension    • Myocardial infarction  "(HCC)    • Neuropathy    • Tachycardia    • Urothelial carcinoma (HCC)       Past Surgical History:   Procedure Laterality Date   • CARDIAC CATHETERIZATION     • CARPAL TUNNEL RELEASE Bilateral    • CATARACT EXTRACTION, BILATERAL Bilateral    • HERNIA REPAIR     • TOTAL HIP ARTHROPLASTY Bilateral    • TRANSURETHRAL RESECTION OF BLADDER  09/24/2018        Current Outpatient Medications:   •  amLODIPine (NORVASC) 2.5 MG tablet, TAKE 1 TABLET DAILY., Disp: 90 tablet, Rfl: 3  •  aspirin 81 MG EC tablet, Take 81 mg by mouth Daily., Disp: , Rfl:   •  atenolol (TENORMIN) 50 MG tablet, TAKE ONE TABLET BY MOUTH DAILY, Disp: 90 tablet, Rfl: 3  •  atorvastatin (LIPITOR) 20 MG tablet, Take 1 tablet by mouth Daily., Disp: 90 tablet, Rfl: 3  •  Calcium Carb-Cholecalciferol 250-125 MG-UNIT tablet, Take 1 tablet by mouth Daily., Disp: , Rfl:   •  Cinnamon 500 MG capsule, Take 1,000 mg by mouth Daily., Disp: , Rfl:   •  Dulaglutide (Trulicity) 1.5 MG/0.5ML solution pen-injector, Inject 1.5 mg under the skin into the appropriate area as directed 1 (One) Time Per Week., Disp: 4 pen, Rfl: 5  •  dutasteride (AVODART) 0.5 MG capsule, Take 0.5 mg by mouth Daily., Disp: , Rfl:   •  gabapentin (NEURONTIN) 300 MG capsule, TAKE ONE CAPSULE THREE TIMES DAILY, Disp: 270 capsule, Rfl: 1  •  glipizide (GLUCOTROL) 10 MG tablet, TAKE 1 TABLET BY MOUTH TWICE DAILY., Disp: 180 tablet, Rfl: 3  •  Insulin Glargine (LANTUS SOLOSTAR) 100 UNIT/ML injection pen, Inject 10 Units under the skin into the appropriate area as directed Daily., Disp: 2 pen, Rfl: 5  •  Insulin Pen Needle (PEN NEEDLES 3/16\") 31G X 5 MM misc, 1 each Daily., Disp: 90 each, Rfl: 3  •  levothyroxine (SYNTHROID, LEVOTHROID) 50 MCG tablet, Take 1 tablet by mouth Daily., Disp: 90 tablet, Rfl: 3  •  metFORMIN (Glumetza) 1000 MG (MOD) 24 hr tablet, Take 1 tablet by mouth 2 (Two) Times a Day With Meals., Disp: 180 tablet, Rfl: 3  •  Multiple Vitamins-Minerals (MULTIVITAMIN WITH MINERALS) " tablet tablet, Take 1 tablet by mouth Daily., Disp: , Rfl:   •  Omega-3 Fatty Acids (FISH OIL) 1000 MG capsule capsule, Take 1,000 mg by mouth Daily With Breakfast., Disp: , Rfl:   •  tamsulosin (FLOMAX) 0.4 MG capsule 24 hr capsule, Take 1 capsule by mouth 2 (Two) Times a Day., Disp: , Rfl:   •  TURMERIC PO, Take 550 mg by mouth Daily., Disp: , Rfl:       Vitals:    12/08/21 0855   BP: 132/70   Pulse: 70   Temp: 96.8 °F (36 °C)   SpO2: 99%         12/08/21 0855   Weight: 102 kg (225 lb)       Body mass index is 28.89 kg/m².    Physical Exam  Vitals and nursing note reviewed.   Constitutional:       General: He is not in acute distress.     Appearance: Normal appearance. He is well-developed.   HENT:      Head: Normocephalic and atraumatic.      Right Ear: External ear normal.      Left Ear: External ear normal.      Nose: Nose normal.   Eyes:      Extraocular Movements: Extraocular movements intact.      Conjunctiva/sclera: Conjunctivae normal.      Pupils: Pupils are equal, round, and reactive to light.   Cardiovascular:      Rate and Rhythm: Normal rate and regular rhythm.      Pulses: Normal pulses.      Heart sounds: Normal heart sounds.   Pulmonary:      Effort: Pulmonary effort is normal.      Breath sounds: Normal breath sounds.   Abdominal:      General: Bowel sounds are normal.      Palpations: Abdomen is soft.   Musculoskeletal:         General: Normal range of motion.      Cervical back: Normal range of motion and neck supple. No rigidity. No muscular tenderness.   Skin:     General: Skin is warm and dry.   Neurological:      General: No focal deficit present.      Mental Status: He is alert and oriented to person, place, and time.   Psychiatric:         Mood and Affect: Mood normal.         Behavior: Behavior normal.               Assessment/Plan   Diagnoses and all orders for this visit:    1. Type 2 diabetes mellitus with diabetic neuropathy, without long-term current use of insulin (HCC)  (Primary)    2. Primary hypertension    3. Neuropathy      Patient is here for follow-up of his poorly controlled diabetes were starting him on insulin and Trulicity.  He understands how to use his medication asked him to increase his Lantus to 15units for the next couple weeks if he still getting some high readings I have gone ahead and given him an okay to increase to 20 units.  If he still not under control the next visit we'll continue to titrate his insulin until we get him under good control.

## 2022-01-03 RX ORDER — DULAGLUTIDE 1.5 MG/.5ML
1.5 INJECTION, SOLUTION SUBCUTANEOUS WEEKLY
Qty: 2 ML | Refills: 1 | Status: SHIPPED | OUTPATIENT
Start: 2022-01-03 | End: 2022-03-24 | Stop reason: SDUPTHER

## 2022-01-05 ENCOUNTER — OFFICE VISIT (OUTPATIENT)
Dept: INTERNAL MEDICINE | Facility: CLINIC | Age: 87
End: 2022-01-05

## 2022-01-05 VITALS
TEMPERATURE: 97.4 F | DIASTOLIC BLOOD PRESSURE: 68 MMHG | WEIGHT: 227 LBS | SYSTOLIC BLOOD PRESSURE: 120 MMHG | BODY MASS INDEX: 29.13 KG/M2 | OXYGEN SATURATION: 98 % | HEART RATE: 74 BPM | HEIGHT: 74 IN

## 2022-01-05 DIAGNOSIS — E78.2 MIXED HYPERLIPIDEMIA: ICD-10-CM

## 2022-01-05 DIAGNOSIS — E11.40 TYPE 2 DIABETES MELLITUS WITH DIABETIC NEUROPATHY, WITHOUT LONG-TERM CURRENT USE OF INSULIN: Primary | ICD-10-CM

## 2022-01-05 DIAGNOSIS — E03.9 ACQUIRED HYPOTHYROIDISM: ICD-10-CM

## 2022-01-05 DIAGNOSIS — I10 PRIMARY HYPERTENSION: ICD-10-CM

## 2022-01-05 DIAGNOSIS — C67.9 MALIGNANT NEOPLASM OF URINARY BLADDER, UNSPECIFIED SITE: ICD-10-CM

## 2022-01-05 DIAGNOSIS — I25.10 CORONARY ARTERY DISEASE INVOLVING NATIVE CORONARY ARTERY OF NATIVE HEART WITHOUT ANGINA PECTORIS: ICD-10-CM

## 2022-01-05 PROCEDURE — 99214 OFFICE O/P EST MOD 30 MIN: CPT | Performed by: INTERNAL MEDICINE

## 2022-01-05 NOTE — PROGRESS NOTES
Subjective   Sarkis Naranjo is a 86 y.o. male.   Chief Complaint   Patient presents with   • Diabetes     4 week follow up        History of Present Illness patient is here with diabetes he brings in readings today he is increased his insulin to 20 units.    The following portions of the patient's history were reviewed and updated as appropriate: allergies, current medications, past family history, past medical history, past social history, past surgical history and problem list.    Review of Systems   Constitutional: Negative for activity change, appetite change, fatigue, fever, unexpected weight gain and unexpected weight loss.   HENT: Negative for swollen glands, trouble swallowing and voice change.    Eyes: Negative for blurred vision and visual disturbance.   Respiratory: Negative for cough and shortness of breath.    Cardiovascular: Negative for chest pain, palpitations and leg swelling.   Gastrointestinal: Negative for abdominal pain, constipation, diarrhea, nausea, vomiting and indigestion.   Endocrine: Negative for cold intolerance, heat intolerance, polydipsia and polyphagia.   Genitourinary: Negative for dysuria and frequency.   Musculoskeletal: Positive for arthralgias and back pain. Negative for joint swelling and neck pain.   Skin: Negative for color change, rash and skin lesions.   Neurological: Negative for dizziness, weakness, headache, memory problem and confusion.   Hematological: Does not bruise/bleed easily.   Psychiatric/Behavioral: Negative for agitation, hallucinations and suicidal ideas. The patient is not nervous/anxious.        Objective   Past Medical History:   Diagnosis Date   • Arteriolosclerosis    • Diabetes mellitus (HCC)    • Hyperlipidemia    • Hypertension    • Myocardial infarction (HCC)    • Neuropathy    • Tachycardia    • Urothelial carcinoma (HCC)       Past Surgical History:   Procedure Laterality Date   • CARDIAC CATHETERIZATION     • CARPAL TUNNEL RELEASE Bilateral    •  "CATARACT EXTRACTION, BILATERAL Bilateral    • HERNIA REPAIR     • TOTAL HIP ARTHROPLASTY Bilateral    • TRANSURETHRAL RESECTION OF BLADDER  09/24/2018        Current Outpatient Medications:   •  amLODIPine (NORVASC) 2.5 MG tablet, TAKE 1 TABLET DAILY., Disp: 90 tablet, Rfl: 3  •  aspirin 81 MG EC tablet, Take 81 mg by mouth Daily., Disp: , Rfl:   •  atenolol (TENORMIN) 50 MG tablet, TAKE ONE TABLET BY MOUTH DAILY, Disp: 90 tablet, Rfl: 3  •  atorvastatin (LIPITOR) 20 MG tablet, Take 1 tablet by mouth Daily., Disp: 90 tablet, Rfl: 3  •  Calcium Carb-Cholecalciferol 250-125 MG-UNIT tablet, Take 1 tablet by mouth Daily., Disp: , Rfl:   •  Cinnamon 500 MG capsule, Take 1,000 mg by mouth Daily., Disp: , Rfl:   •  dutasteride (AVODART) 0.5 MG capsule, Take 0.5 mg by mouth Daily., Disp: , Rfl:   •  gabapentin (NEURONTIN) 300 MG capsule, TAKE ONE CAPSULE THREE TIMES DAILY, Disp: 270 capsule, Rfl: 1  •  glipizide (GLUCOTROL) 10 MG tablet, TAKE 1 TABLET BY MOUTH TWICE DAILY., Disp: 180 tablet, Rfl: 3  •  Insulin Glargine (LANTUS SOLOSTAR) 100 UNIT/ML injection pen, 15 units daily for the next 2 weeks if glucose is greater than 150 he may increase to 20 units prior to his next visit (Patient taking differently: 20 Units. 15 units daily for the next 2 weeks if glucose is greater than 150 he may increase to 20 units prior to his next visit), Disp: 2 pen, Rfl: 5  •  Insulin Pen Needle (PEN NEEDLES 3/16\") 31G X 5 MM misc, 1 each Daily., Disp: 90 each, Rfl: 3  •  levothyroxine (SYNTHROID, LEVOTHROID) 50 MCG tablet, Take 1 tablet by mouth Daily., Disp: 90 tablet, Rfl: 3  •  metFORMIN (Glumetza) 1000 MG (MOD) 24 hr tablet, Take 1 tablet by mouth 2 (Two) Times a Day With Meals., Disp: 180 tablet, Rfl: 3  •  Multiple Vitamins-Minerals (MULTIVITAMIN WITH MINERALS) tablet tablet, Take 1 tablet by mouth Daily., Disp: , Rfl:   •  Omega-3 Fatty Acids (FISH OIL) 1000 MG capsule capsule, Take 1,000 mg by mouth Daily With Breakfast., Disp: , " Rfl:   •  tamsulosin (FLOMAX) 0.4 MG capsule 24 hr capsule, Take 1 capsule by mouth 2 (Two) Times a Day., Disp: , Rfl:   •  Trulicity 1.5 MG/0.5ML solution pen-injector, Inject 1.5 mg under the skin into the appropriate area as directed 1 (One) Time Per Week., Disp: 2 mL, Rfl: 1  •  TURMERIC PO, Take 550 mg by mouth Daily., Disp: , Rfl:       Vitals:    01/05/22 1110   BP: 120/68   Pulse: 74   Temp: 97.4 °F (36.3 °C)   SpO2: 98%         01/05/22  1110   Weight: 103 kg (227 lb)       Body mass index is 29.15 kg/m².    Physical Exam  Vitals and nursing note reviewed.   Constitutional:       General: He is not in acute distress.     Appearance: Normal appearance. He is well-developed.   HENT:      Head: Normocephalic and atraumatic.      Right Ear: External ear normal.      Left Ear: External ear normal.      Nose: Nose normal.   Eyes:      Extraocular Movements: Extraocular movements intact.      Conjunctiva/sclera: Conjunctivae normal.      Pupils: Pupils are equal, round, and reactive to light.   Cardiovascular:      Rate and Rhythm: Normal rate and regular rhythm.      Pulses: Normal pulses.      Heart sounds: Normal heart sounds.   Pulmonary:      Effort: Pulmonary effort is normal.      Breath sounds: Normal breath sounds.   Abdominal:      General: Bowel sounds are normal.      Palpations: Abdomen is soft.   Musculoskeletal:         General: Normal range of motion.      Cervical back: Normal range of motion and neck supple. No rigidity. No muscular tenderness.   Skin:     General: Skin is warm and dry.   Neurological:      General: No focal deficit present.      Mental Status: He is alert and oriented to person, place, and time.   Psychiatric:         Mood and Affect: Mood normal.         Behavior: Behavior normal.               Assessment/Plan   Diagnoses and all orders for this visit:    1. Type 2 diabetes mellitus with diabetic neuropathy, without long-term current use of insulin (HCC) (Primary)    2.  Malignant neoplasm of urinary bladder, unspecified site (HCC)    3. Coronary artery disease involving native coronary artery of native heart without angina pectoris    4. Acquired hypothyroidism    5. Primary hypertension    6. Mixed hyperlipidemia      Patient has type 2 diabetes has been poorly controlled he brought in readings this visit after increasing his insulin to 20 units he is under much better control.  He has a number of other medical problems which are way into the control of his diabetes however at this point I think he is on track to get better control we will put him back on scheduled come back in about 3 months.

## 2022-01-18 DIAGNOSIS — E11.40 TYPE 2 DIABETES MELLITUS WITH DIABETIC NEUROPATHY, WITHOUT LONG-TERM CURRENT USE OF INSULIN: Primary | ICD-10-CM

## 2022-01-18 NOTE — TELEPHONE ENCOUNTER
Caller: erwin cornelius    Relationship: Emergency Contact    Best call back number: [phone]    Requested Prescriptions:   TEST STRIPS     Pharmacy where request should be sent: [facility] [address] [phone] [facility] [phone]      Additional details provided by patient: PT'S PRESCRIPTION NEEDS TO BE UPDATED TO REFLECT THAT HE TESTS TWICE PER DAY    Does the patient have less than a 3 day supply:  [x] Yes  [] No    Doyle Davis Rep   01/18/22 10:39 CST

## 2022-03-24 DIAGNOSIS — E11.40 TYPE 2 DIABETES MELLITUS WITH DIABETIC NEUROPATHY, WITHOUT LONG-TERM CURRENT USE OF INSULIN: Primary | ICD-10-CM

## 2022-03-24 RX ORDER — DULAGLUTIDE 1.5 MG/.5ML
1.5 INJECTION, SOLUTION SUBCUTANEOUS WEEKLY
Qty: 2 ML | Refills: 3 | Status: SHIPPED | OUTPATIENT
Start: 2022-03-24 | End: 2022-06-14

## 2022-03-24 NOTE — TELEPHONE ENCOUNTER
Caller: erwin cornelius    Relationship: Emergency Contact    Best call back number: [phone]    Requested Prescriptions:   Requested Prescriptions     Pending Prescriptions Disp Refills   • Dulaglutide (Trulicity) 1.5 MG/0.5ML solution pen-injector 2 mL 1     Sig: Inject 1.5 mg under the skin into the appropriate area as directed 1 (One) Time Per Week.        Pharmacy where request should be sent: [address] [phone] [facility] [phone]      Additional details provided by patient:       He is needing this by tomorrow. Please advise.     Does the patient have less than a 3 day supply:  [x] Yes  [] No    Adrien Spain MA   03/24/22 14:15 CDT

## 2022-04-06 ENCOUNTER — OFFICE VISIT (OUTPATIENT)
Dept: INTERNAL MEDICINE | Facility: CLINIC | Age: 87
End: 2022-04-06

## 2022-04-06 VITALS
TEMPERATURE: 98.2 F | SYSTOLIC BLOOD PRESSURE: 130 MMHG | WEIGHT: 233.2 LBS | HEIGHT: 74 IN | DIASTOLIC BLOOD PRESSURE: 90 MMHG | HEART RATE: 65 BPM | RESPIRATION RATE: 20 BRPM | OXYGEN SATURATION: 97 % | BODY MASS INDEX: 29.93 KG/M2

## 2022-04-06 DIAGNOSIS — E78.2 MIXED HYPERLIPIDEMIA: ICD-10-CM

## 2022-04-06 DIAGNOSIS — I10 PRIMARY HYPERTENSION: ICD-10-CM

## 2022-04-06 DIAGNOSIS — E03.9 ACQUIRED HYPOTHYROIDISM: ICD-10-CM

## 2022-04-06 DIAGNOSIS — M15.9 PRIMARY OSTEOARTHRITIS INVOLVING MULTIPLE JOINTS: ICD-10-CM

## 2022-04-06 DIAGNOSIS — Z79.899 ENCOUNTER FOR LONG-TERM (CURRENT) USE OF OTHER MEDICATIONS: Primary | ICD-10-CM

## 2022-04-06 DIAGNOSIS — E11.40 TYPE 2 DIABETES MELLITUS WITH DIABETIC NEUROPATHY, WITHOUT LONG-TERM CURRENT USE OF INSULIN: ICD-10-CM

## 2022-04-06 LAB
EXPIRATION DATE: ABNORMAL
HBA1C MFR BLD: 6.9 %
Lab: ABNORMAL

## 2022-04-06 PROCEDURE — 99214 OFFICE O/P EST MOD 30 MIN: CPT | Performed by: FAMILY MEDICINE

## 2022-04-06 PROCEDURE — 3044F HG A1C LEVEL LT 7.0%: CPT | Performed by: FAMILY MEDICINE

## 2022-04-06 PROCEDURE — 83036 HEMOGLOBIN GLYCOSYLATED A1C: CPT | Performed by: FAMILY MEDICINE

## 2022-04-06 RX ORDER — MELOXICAM 15 MG/1
15 TABLET ORAL DAILY
Qty: 30 TABLET | Refills: 3 | Status: SHIPPED | OUTPATIENT
Start: 2022-04-06 | End: 2022-07-21

## 2022-04-06 NOTE — PROGRESS NOTES
Subjective     Chief Complaint   Patient presents with   • Diabetes     Follow up, a1c: 6.9     The patient has consented to being recorded using [facility].     History of Present Illness     The patient is accompanied by an adult female who contributes to the history.    Hypertension  The patient confirms that he occasionally checks his blood pressure at home and that it consistently is approximately 120 over 70 mm Hg.    Diabetes mellitus type 2  He reports that he is feeling pretty well. The patient reports that he checks his blood glucose at home and it has been really low recently at 68 or 69 mg dL. He denies feeling bad when it is low and denies a dosage change to his medication. The  adult female states that patient is currently taking metformin, Trulicity, and Lantus. She states that the Trulicity was not effective when patient used it for a trial however he is still on it. She confirms that patient is taking glipizide 10 mg, in the morning and at night. He inquires about his A1c level because it was 10 percent at the last visit and that is the highest it has ever been.    Bladder problems  He reports having cancer in his bladder twice which was successfully removed and he has had a chronic catheter since then. The patient reports that his bladder overworks. The adult female disagrees and states that his bladder will not empty. He states that the doctors recommended surgery to cut off some of his prostrate however he did not want to go through that. He states that he is hoping there will be a less evasive procedure in the future.    Osteoarthritis, chronic joint pain  The adult female reports that patient's bilateral hands and  knees have been bothering him. She inquires if patient can take meloxicam 15 mg for this.    General wellness  The adult female states that patient recently had non-fasting blood work done. She inquires if patient needs to be fasting for his blood work today.    Exercise  The  patient reports being less active and staying close to home since his wife had hip surgery. He states that he is eating too much. He states that he mows the yard and works in the garden.    Patient's PMR from outside medical facility reviewed and noted.    Review of Systems     Otherwise complete ROS reviewed and negative except as mentioned in the HPI.    Past Medical History:   Past Medical History:   Diagnosis Date   • Arteriolosclerosis    • Diabetes mellitus (HCC)    • Hyperlipidemia    • Hypertension    • Myocardial infarction (HCC)    • Neuropathy    • Tachycardia    • Urothelial carcinoma (HCC)      Past Surgical History:  Past Surgical History:   Procedure Laterality Date   • CARDIAC CATHETERIZATION     • CARPAL TUNNEL RELEASE Bilateral    • CATARACT EXTRACTION, BILATERAL Bilateral    • HERNIA REPAIR     • TOTAL HIP ARTHROPLASTY Bilateral    • TRANSURETHRAL RESECTION OF BLADDER  09/24/2018     Social History:  reports that he has never smoked. He has never used smokeless tobacco. He reports current alcohol use. He reports that he does not use drugs.    Family History: family history includes Cancer in his mother; Hypertension in his father; Stroke in his paternal grandfather.      Allergies:  No Known Allergies  Medications:  Prior to Admission medications    Medication Sig Start Date End Date Taking? Authorizing Provider   amLODIPine (NORVASC) 2.5 MG tablet TAKE 1 TABLET DAILY. 10/13/21  Yes Christophe Harris MD   aspirin 81 MG EC tablet Take 81 mg by mouth Daily.   Yes ProviderJanelle MD   atenolol (TENORMIN) 50 MG tablet TAKE ONE TABLET BY MOUTH DAILY 8/17/21  Yes Christophe Harris MD   atorvastatin (LIPITOR) 20 MG tablet Take 1 tablet by mouth Daily. 10/14/21  Yes Christophe Harris MD   Calcium Carb-Cholecalciferol 250-125 MG-UNIT tablet Take 1 tablet by mouth Daily.   Yes ProviderJanelle MD   Cinnamon 500 MG capsule Take 1,000 mg by mouth Daily.   Yes ProviderJanelle MD   Dulaglutide  "(Trulicity) 1.5 MG/0.5ML solution pen-injector Inject 1.5 mg under the skin into the appropriate area as directed 1 (One) Time Per Week. 3/24/22  Yes Nieves Young DO   dutasteride (AVODART) 0.5 MG capsule Take 0.5 mg by mouth Daily.   Yes Janelle Lee MD   gabapentin (NEURONTIN) 300 MG capsule TAKE ONE CAPSULE THREE TIMES DAILY 10/11/21  Yes Christophe Harris MD   glipizide (GLUCOTROL) 10 MG tablet TAKE 1 TABLET BY MOUTH TWICE DAILY. 10/25/21  Yes Christophe Harris MD   glucose blood test strip 1 each by Other route 2 (Two) Times a Day. Use as instructed 1/18/22  Yes Nicole Daley APRN   Insulin Glargine (LANTUS SOLOSTAR) 100 UNIT/ML injection pen Inject 20 Units under the skin into the appropriate area as directed Daily. 1/5/22  Yes Christophe Harris MD   Insulin Pen Needle (PEN NEEDLES 3/16\") 31G X 5 MM misc 1 each Daily. 3/4/20  Yes Nicole Daley APRN   levothyroxine (SYNTHROID, LEVOTHROID) 50 MCG tablet Take 1 tablet by mouth Daily. 8/17/21  Yes Christophe Harris MD   metFORMIN (Glumetza) 1000 MG (MOD) 24 hr tablet Take 1 tablet by mouth 2 (Two) Times a Day With Meals. 3/29/21  Yes Krystal Nuñez APRN   Multiple Vitamins-Minerals (MULTIVITAMIN WITH MINERALS) tablet tablet Take 1 tablet by mouth Daily.   Yes Janelle Lee MD   Omega-3 Fatty Acids (FISH OIL) 1000 MG capsule capsule Take 1,000 mg by mouth Daily With Breakfast.   Yes Janelle Lee MD   tamsulosin (FLOMAX) 0.4 MG capsule 24 hr capsule Take 1 capsule by mouth 2 (Two) Times a Day.   Yes Janelle Lee MD   TURMERIC PO Take 550 mg by mouth Daily.   Yes Janelle Lee MD       Objective     Vital Signs: /90 (BP Location: Right arm, Patient Position: Sitting, Cuff Size: Adult)   Pulse 65   Temp 98.2 °F (36.8 °C) (Infrared)   Resp 20   Ht 188 cm (74\") Comment: pt reported.  Wt 106 kg (233 lb 3.2 oz)   SpO2 97%   BMI 29.94 kg/m²      Physical Exam  Vitals and nursing " note reviewed.   Constitutional:       Appearance: Normal appearance.   HENT:      Head: Normocephalic and atraumatic.      Right Ear: External ear normal.      Left Ear: External ear normal.      Nose: Nose normal.      Mouth/Throat:      Mouth: Mucous membranes are moist.   Eyes:      Extraocular Movements: Extraocular movements intact.      Conjunctiva/sclera: Conjunctivae normal.      Pupils: Pupils are equal, round, and reactive to light.   Cardiovascular:      Rate and Rhythm: Normal rate and regular rhythm.      Pulses: Normal pulses.      Heart sounds: No murmur heard.    No friction rub. No gallop.   Pulmonary:      Effort: Pulmonary effort is normal.      Breath sounds: Normal breath sounds.   Abdominal:      General: Bowel sounds are normal.      Palpations: Abdomen is soft.   Musculoskeletal:         General: Normal range of motion.      Cervical back: Normal range of motion and neck supple.   Skin:     General: Skin is warm and dry.      Capillary Refill: Capillary refill takes less than 2 seconds.   Neurological:      General: No focal deficit present.      Mental Status: He is alert and oriented to person, place, and time.      Cranial Nerves: No cranial nerve deficit.   Psychiatric:         Mood and Affect: Mood normal.         Behavior: Behavior normal.           Patient's Body mass index is 29.94 kg/m². indicating that he is overweight (BMI 25-29.9). Patient's (Body mass index is 29.94 kg/m².) indicates that they are overweight with health conditions that include dyslipidemias . Weight is worsening. BMI is is above average; BMI management plan is completed. We discussed portion control and increasing exercise. .      Results Reviewed:  Glucose   Date Value Ref Range Status   04/06/2022 85 65 - 99 mg/dL Final   06/05/2020 174 (H) 74 - 109 mg/dL Final     BUN   Date Value Ref Range Status   04/06/2022 15 8 - 27 mg/dL Final   06/05/2020 22 8 - 23 mg/dL Final     Creatinine   Date Value Ref Range  Status   04/06/2022 0.97 0.76 - 1.27 mg/dL Final   06/05/2020 1.1 0.5 - 1.2 mg/dL Final     Sodium   Date Value Ref Range Status   04/06/2022 142 134 - 144 mmol/L Final   06/05/2020 141 136 - 145 mmol/L Final     Potassium   Date Value Ref Range Status   04/06/2022 4.7 3.5 - 5.2 mmol/L Final   06/05/2020 4.8 3.5 - 5.0 mmol/L Final     Chloride   Date Value Ref Range Status   04/06/2022 104 96 - 106 mmol/L Final   06/05/2020 102 98 - 111 mmol/L Final     CO2   Date Value Ref Range Status   06/05/2020 25 22 - 29 mmol/L Final     Total CO2   Date Value Ref Range Status   04/06/2022 25 20 - 29 mmol/L Final     Calcium   Date Value Ref Range Status   04/06/2022 10.1 8.6 - 10.2 mg/dL Final   06/05/2020 9.6 8.8 - 10.2 mg/dL Final     ALT (SGPT)   Date Value Ref Range Status   04/06/2022 24 0 - 44 IU/L Final   06/05/2020 20 5 - 41 U/L Final     AST (SGOT)   Date Value Ref Range Status   04/06/2022 23 0 - 40 IU/L Final   06/05/2020 19 5 - 40 U/L Final     WBC   Date Value Ref Range Status   06/05/2020 8.0 4.8 - 10.8 K/uL Final     Hematocrit   Date Value Ref Range Status   06/05/2020 47.8 42.0 - 52.0 % Final     Platelets   Date Value Ref Range Status   06/05/2020 273 130 - 400 K/uL Final     Triglycerides   Date Value Ref Range Status   05/05/2021 216 (H) 0 - 150 mg/dL Final   06/05/2020 153 (H) 0 - 149 mg/dL Final     HDL Cholesterol   Date Value Ref Range Status   05/05/2021 37 (L) 40 - 60 mg/dL Final   06/05/2020 41 (L) 55 - 121 mg/dL Final     Comment:     VALUES>60 MG/DL ARE ASSOCIATED WITH A DECREASED RISK OF  ATHEROSCLEROTIC CARDIOVASCULAR DISEASE     LDL Cholesterol    Date Value Ref Range Status   06/05/2020 55 <100 mg/dL Final     Comment:     <100 MG/DL=OPITIMAL    100-129 MG/DL=DESIRABLE    130-159 MG/DL BORDERLINE=INCREASED RISK OF ATHEROSCLEROTIC  CARDIOVASCULAR DISEASE    > OR = 160 MG/DL=ASSOCIATED WITH AN INCREASE RISK OF  ATHEROSCLEROTIC CARDIOVASCULAR DISEASE     LDL Chol Calc ([facility])   Date Value Ref Range  Status   05/05/2021 72 0 - 100 mg/dL Final     LDL/HDL Ratio   Date Value Ref Range Status   02/07/2014 2.0 0.0 - 3.4 Final     Hemoglobin A1C   Date Value Ref Range Status   04/06/2022 6.9 %    06/05/2020 8.1 (H) 4.0 - 6.0 % Final     Comment:     HbA1c levels >6% are an indication of hyperglycemia during the preceding 2  to 3 months or longer.    HbA1c levels may reach 20% or higher in poorly controlled diabetes.  Therapeutic action is suggested at levels above 8%.    Diabetes patients with HbA1c levels below 7% meet the goal of the American  Diabetes Association.    HbA1c levels below the established reference range may indicate recent  episodes of hypoglycemia, the presence of Hb variants, or shortened lifetime  of erythrocytes.          Assessment / Plan     Assessment/Plan:  1. Encounter for long-term (current) use of other medications    - Compliance Drug Analysis, Ur - Urine, Clean Catch    2. Type 2 diabetes mellitus with diabetic neuropathy, without long-term current use of insulin (HCC)    - POC Glycated Hemoglobin, Total  - Comprehensive metabolic panel    3. Primary osteoarthritis involving multiple joints   We will also prescribe meloxicam for him at 15 mg daily. However, he is not to pick this up until we know that the chemistry numbers are okay.     4. Acquired hypothyroidism  Monitor thyroid levels.  Continue current medication    5. Primary hypertension  Monitor blood pressure at home goal less than 130/80 continue current medications    6. Mixed hyperlipidemia  Low-cholesterol diet continue atorvastatin      Return in about 3 months (around 7/6/2022). unless patient needs to be seen sooner or acute issues arise.      I have discussed the patient results/orders and and plan/recommendation with them at today's visit.      Transcribed from ambient dictation for Nieves Young DO by Elle Harp.  04/06/22   12:50 CDT    Patient verbalized consent to the visit recording.

## 2022-04-11 ENCOUNTER — TELEPHONE (OUTPATIENT)
Dept: INTERNAL MEDICINE | Facility: CLINIC | Age: 87
End: 2022-04-11

## 2022-04-11 LAB
ALBUMIN SERPL-MCNC: 4.2 G/DL (ref 3.6–4.6)
ALBUMIN/GLOB SERPL: 1.4 {RATIO} (ref 1.2–2.2)
ALP SERPL-CCNC: 64 IU/L (ref 44–121)
ALT SERPL-CCNC: 24 IU/L (ref 0–44)
AST SERPL-CCNC: 23 IU/L (ref 0–40)
BILIRUB SERPL-MCNC: 0.7 MG/DL (ref 0–1.2)
BUN SERPL-MCNC: 15 MG/DL (ref 8–27)
BUN/CREAT SERPL: 15 (ref 10–24)
CALCIUM SERPL-MCNC: 10.1 MG/DL (ref 8.6–10.2)
CHLORIDE SERPL-SCNC: 104 MMOL/L (ref 96–106)
CO2 SERPL-SCNC: 25 MMOL/L (ref 20–29)
CREAT SERPL-MCNC: 0.97 MG/DL (ref 0.76–1.27)
DRUGS UR: NORMAL
EGFRCR SERPLBLD CKD-EPI 2021: 77 ML/MIN/1.73
GLOBULIN SER CALC-MCNC: 3.1 G/DL (ref 1.5–4.5)
GLUCOSE SERPL-MCNC: 85 MG/DL (ref 65–99)
POTASSIUM SERPL-SCNC: 4.7 MMOL/L (ref 3.5–5.2)
PROT SERPL-MCNC: 7.3 G/DL (ref 6–8.5)
SODIUM SERPL-SCNC: 142 MMOL/L (ref 134–144)

## 2022-04-11 NOTE — TELEPHONE ENCOUNTER
Caller: tatiana cornelius    Relationship: Emergency Contact    Best call back number: [phone]        Who are you requesting to speak with (clinical staff, provider,  specific staff member): CLINICAL STAFF    Do you know the name of the person who called: TATIANA    What was the call regarding: WAITING ON LAB RESULTS    Do you require a callback: CALL BACK WHEN LAB RESULTS ARE FINISHED

## 2022-04-12 PROBLEM — M15.9 OSTEOARTHRITIS OF MULTIPLE JOINTS: Status: ACTIVE | Noted: 2022-04-12

## 2022-04-25 DIAGNOSIS — G62.9 NEUROPATHY: ICD-10-CM

## 2022-04-25 RX ORDER — GABAPENTIN 300 MG/1
300 CAPSULE ORAL 3 TIMES DAILY
Qty: 270 CAPSULE | Refills: 1 | Status: SHIPPED | OUTPATIENT
Start: 2022-04-25 | End: 2022-09-28

## 2022-04-25 RX ORDER — METFORMIN HYDROCHLORIDE 1000 MG/1
1000 TABLET, FILM COATED, EXTENDED RELEASE ORAL 2 TIMES DAILY WITH MEALS
Qty: 180 TABLET | Refills: 3 | Status: SHIPPED | OUTPATIENT
Start: 2022-04-25 | End: 2022-12-09

## 2022-04-25 NOTE — TELEPHONE ENCOUNTER
Caller: erwin cornelius    Relationship: Emergency Contact    Best call back number: [phone]    Requested Prescriptions:   Requested Prescriptions     Pending Prescriptions Disp Refills   • gabapentin (NEURONTIN) 300 MG capsule 270 capsule 1     Sig: Take 1 capsule by mouth 3 (Three) Times a Day.   • metFORMIN (Glumetza) 1000 MG (MOD) 24 hr tablet 180 tablet 3     Sig: Take 1 tablet by mouth 2 (Two) Times a Day With Meals.        Pharmacy where request should be sent: [facility] [address] [phone] [place] [phone] [facility]         Does the patient have less than a 3 day supply:  [] Yes  [] No    Doyle Larose Rep   04/25/22 09:10 CDT

## 2022-06-14 DIAGNOSIS — E11.40 TYPE 2 DIABETES MELLITUS WITH DIABETIC NEUROPATHY, WITHOUT LONG-TERM CURRENT USE OF INSULIN: ICD-10-CM

## 2022-06-14 RX ORDER — DULAGLUTIDE 1.5 MG/.5ML
1.5 INJECTION, SOLUTION SUBCUTANEOUS WEEKLY
Qty: 2 ML | Refills: 5 | Status: SHIPPED | OUTPATIENT
Start: 2022-06-14 | End: 2022-11-30

## 2022-07-20 ENCOUNTER — OFFICE VISIT (OUTPATIENT)
Dept: INTERNAL MEDICINE | Facility: CLINIC | Age: 87
End: 2022-07-20

## 2022-07-20 VITALS
HEIGHT: 74 IN | TEMPERATURE: 96.9 F | HEART RATE: 68 BPM | BODY MASS INDEX: 29.52 KG/M2 | SYSTOLIC BLOOD PRESSURE: 130 MMHG | OXYGEN SATURATION: 98 % | WEIGHT: 230 LBS | DIASTOLIC BLOOD PRESSURE: 80 MMHG

## 2022-07-20 DIAGNOSIS — I73.9 CLAUDICATION OF BOTH LOWER EXTREMITIES: ICD-10-CM

## 2022-07-20 DIAGNOSIS — Z79.899 ENCOUNTER FOR LONG-TERM (CURRENT) USE OF OTHER MEDICATIONS: ICD-10-CM

## 2022-07-20 DIAGNOSIS — I10 PRIMARY HYPERTENSION: ICD-10-CM

## 2022-07-20 DIAGNOSIS — Z00.00 ENCOUNTER FOR SUBSEQUENT ANNUAL WELLNESS VISIT (AWV) IN MEDICARE PATIENT: Primary | ICD-10-CM

## 2022-07-20 DIAGNOSIS — E78.2 MIXED HYPERLIPIDEMIA: ICD-10-CM

## 2022-07-20 DIAGNOSIS — E11.40 TYPE 2 DIABETES MELLITUS WITH DIABETIC NEUROPATHY, WITHOUT LONG-TERM CURRENT USE OF INSULIN: ICD-10-CM

## 2022-07-20 LAB — HBA1C MFR BLD: 7.3 %

## 2022-07-20 PROCEDURE — 3051F HG A1C>EQUAL 7.0%<8.0%: CPT | Performed by: FAMILY MEDICINE

## 2022-07-20 PROCEDURE — 1159F MED LIST DOCD IN RCRD: CPT | Performed by: FAMILY MEDICINE

## 2022-07-20 PROCEDURE — 83036 HEMOGLOBIN GLYCOSYLATED A1C: CPT | Performed by: FAMILY MEDICINE

## 2022-07-20 PROCEDURE — 1126F AMNT PAIN NOTED NONE PRSNT: CPT | Performed by: FAMILY MEDICINE

## 2022-07-20 PROCEDURE — 1170F FXNL STATUS ASSESSED: CPT | Performed by: FAMILY MEDICINE

## 2022-07-20 PROCEDURE — G0439 PPPS, SUBSEQ VISIT: HCPCS | Performed by: FAMILY MEDICINE

## 2022-07-20 PROCEDURE — 99213 OFFICE O/P EST LOW 20 MIN: CPT | Performed by: FAMILY MEDICINE

## 2022-07-20 NOTE — PROGRESS NOTES
The ABCs of the Annual Wellness Visit  Subsequent Medicare Wellness Visit    Chief Complaint   Patient presents with   • Medicare Wellness-subsequent     A1c:  7.3      Subjective    History of Present Illness:  Sarkis Naranjo is a 86 y.o. male who presents for a Subsequent Medicare Wellness Visit.    The patient is accompanied by an adult female.    The patient reports he feels worse compared to last year. He denies being in the hospital overnight in the last year. He does not have a living will and does not want information on a living will. He mentions he is having some memory issues for his age.    Hypertension  The patient reports he does not watch his blood pressure at home. He admits he is tolerating his medication well. He mentions he has so many appointments with the medical profession and that is how he monitors his blood pressure.    Benign prostatic hyperplasia with outflow obstruction  The patient reports he sees his urologist once to change his catheter. He admits he goes to the urologist and they check his catheter pretty often.    Diabetes  The patient admits he is not having any problems with any of his medications. He reports he was taken off of the glipizide. He mentions occasionally in the morning if his blood sugar happens to be up, which is rare. He reports he will take 1 tablet. He mentions he did not take 1 at night because that would make it worse in the morning. He admits he has had his eyes examined this year. He admits he goes once a year. He mentions he has some arthritis in his finger.    Edema  The patient mentions he has some edema in his legs. He mentions he has some neuropathy. The patient admits he takes gabapentin 300 mg a day. He admits it was helping. He reports if he quits it for a while, he could tell if it was helping. He mentions he complains more of his legs hurting. He admits they hurt when he walks. He reports he does not hurt if he is sitting around. He mentions it is  mostly with activity. He reports his right leg does not bother him anymore than his left leg. He mentions his knee is sore when he moves it a certain way. He mentions when he twists it, it hurts in there. He reports he figured that knee is about to wear out. He mentions he has not been x-rayed recently. He reports he did not do lab work before his visit.      The following portions of the patient's history were reviewed and   updated as appropriate: allergies, current medications, past family history, past medical history, past social history, past surgical history and problem list.    Compared to one year ago, the patient feels his physical   health is worse.    Compared to one year ago, the patient feels his mental   health is the same.    Recent Hospitalizations:  He was not admitted to the hospital during the last year.       Current Medical Providers:  Patient Care Team:  Nieves Young DO as PCP - General (Family Medicine)  Ezekiel Heath MD as Cardiologist (Cardiology)  Meño Harris MD as Referring Physician (Internal Medicine)    Outpatient Medications Prior to Visit   Medication Sig Dispense Refill   • amLODIPine (NORVASC) 2.5 MG tablet TAKE 1 TABLET DAILY. 90 tablet 3   • aspirin 81 MG EC tablet Take 81 mg by mouth Daily.     • atenolol (TENORMIN) 50 MG tablet TAKE ONE TABLET BY MOUTH DAILY 90 tablet 3   • atorvastatin (LIPITOR) 20 MG tablet Take 1 tablet by mouth Daily. 90 tablet 3   • Calcium Carb-Cholecalciferol 250-125 MG-UNIT tablet Take 1 tablet by mouth Daily.     • Cinnamon 500 MG capsule Take 1,000 mg by mouth Daily.     • dutasteride (AVODART) 0.5 MG capsule Take 0.5 mg by mouth Daily.     • gabapentin (NEURONTIN) 300 MG capsule Take 1 capsule by mouth 3 (Three) Times a Day. 270 capsule 1   • glucose blood test strip 1 each by Other route 2 (Two) Times a Day. Use as instructed 100 each 5   • Insulin Glargine (LANTUS SOLOSTAR) 100 UNIT/ML injection pen Inject 20 Units  "under the skin into the appropriate area as directed Daily. 6 pen 5   • Insulin Pen Needle (PEN NEEDLES 3/16\") 31G X 5 MM misc 1 each Daily. 90 each 3   • levothyroxine (SYNTHROID, LEVOTHROID) 50 MCG tablet Take 1 tablet by mouth Daily. 90 tablet 3   • metFORMIN (Glumetza) 1000 MG (MOD) 24 hr tablet Take 1 tablet by mouth 2 (Two) Times a Day With Meals. 180 tablet 3   • Multiple Vitamins-Minerals (MULTIVITAMIN WITH MINERALS) tablet tablet Take 1 tablet by mouth Daily.     • Omega-3 Fatty Acids (FISH OIL) 1000 MG capsule capsule Take 1,000 mg by mouth Daily With Breakfast.     • tamsulosin (FLOMAX) 0.4 MG capsule 24 hr capsule Take 1 capsule by mouth 2 (Two) Times a Day.     • Trulicity 1.5 MG/0.5ML solution pen-injector Inject 1.5 mg under the skin into the appropriate area as directed 1 (One) Time Per Week. 2 mL 5   • meloxicam (MOBIC) 15 MG tablet Take 1 tablet by mouth Daily. 30 tablet 3   • TURMERIC PO Take 550 mg by mouth Daily.       No facility-administered medications prior to visit.       No opioid medication identified on active medication list. I have reviewed chart for other potential  high risk medication/s and harmful drug interactions in the elderly.          Aspirin is on active medication list. Aspirin use is indicated based on review of current medical condition/s. Pros and cons of this therapy have been discussed today. Benefits of this medication outweigh potential harm.  Patient has been encouraged to continue taking this medication.  .      Patient Active Problem List   Diagnosis   • Coronary artery disease involving native coronary artery of native heart without angina pectoris   • Hypertension   • Hyperlipidemia   • Acquired hypothyroidism   • Type 2 diabetes mellitus with diabetic neuropathy, without long-term current use of insulin (HCC)   • Enlarged prostate   • H/O heart artery stent   • External hemorrhoid   • Neuropathy   • Peripheral edema   • Urothelial carcinoma (HCC)   • Screening " "for intestinal cancer   • Ulcer of left lower extremity (HCC)   • Bladder cancer (HCC)   • Benign prostatic hyperplasia with urinary obstruction   • Raised prostate specific antigen   • Retention of urine   • Osteoarthritis of multiple joints     Advance Care Planning  Advance Directive is not on file.  ACP discussion was declined by the patient. Patient does not have an advance directive, declines further assistance.          Objective    Vitals:    07/20/22 1058   BP: 130/80   BP Location: Left arm   Patient Position: Sitting   Cuff Size: Adult   Pulse: 68   Temp: 96.9 °F (36.1 °C)   TempSrc: Temporal   SpO2: 98%   Weight: 104 kg (230 lb)   Height: 188 cm (74\")   PainSc: 0-No pain     Estimated body mass index is 29.53 kg/m² as calculated from the following:    Height as of this encounter: 188 cm (74\").    Weight as of this encounter: 104 kg (230 lb).    BMI is >= 25 and <30. (Overweight) The following options were offered after discussion;: exercise counseling/recommendations and nutrition counseling/recommendations      Does the patient have evidence of cognitive impairment? No    Physical Exam  Vitals and nursing note reviewed.   Constitutional:       Appearance: Normal appearance.   HENT:      Head: Normocephalic and atraumatic.      Right Ear: External ear normal.      Left Ear: External ear normal.      Nose: Nose normal.      Mouth/Throat:      Mouth: Mucous membranes are moist.   Eyes:      Extraocular Movements: Extraocular movements intact.      Conjunctiva/sclera: Conjunctivae normal.      Pupils: Pupils are equal, round, and reactive to light.   Cardiovascular:      Rate and Rhythm: Normal rate and regular rhythm.      Pulses:           Dorsalis pedis pulses are 1+ on the right side and 1+ on the left side.        Posterior tibial pulses are 1+ on the right side and 1+ on the left side.      Heart sounds: No murmur heard.    No friction rub. No gallop.   Pulmonary:      Effort: Pulmonary effort is " normal.      Breath sounds: Normal breath sounds.   Abdominal:      General: Bowel sounds are normal.      Palpations: Abdomen is soft.   Musculoskeletal:      Cervical back: Normal range of motion and neck supple.      Right foot: Decreased range of motion.      Left foot: Decreased range of motion.   Feet:      Right foot:      Protective Sensation: 10 sites tested. 3 sites sensed.      Skin integrity: Skin integrity normal.      Toenail Condition: Fungal disease present.     Left foot:      Protective Sensation: 10 sites tested. 9 sites sensed.      Skin integrity: Skin integrity normal.      Toenail Condition: Fungal disease present.  Skin:     General: Skin is warm and dry.      Capillary Refill: Capillary refill takes less than 2 seconds.   Neurological:      General: No focal deficit present.      Mental Status: He is alert and oriented to person, place, and time.      Cranial Nerves: No cranial nerve deficit.   Psychiatric:         Mood and Affect: Mood normal.         Behavior: Behavior normal.       Lab Results   Component Value Date    CHLPL 127 07/20/2022    TRIG 136 07/20/2022    HDL 42 07/20/2022    LDL 61 07/20/2022    VLDL 24 07/20/2022    HGBA1C 7.3 07/20/2022            HEALTH RISK ASSESSMENT    Smoking Status:  Social History     Tobacco Use   Smoking Status Never Smoker   Smokeless Tobacco Never Used     Alcohol Consumption:  Social History     Substance and Sexual Activity   Alcohol Use Yes    Comment: occ     Fall Risk Screen:    STEADI Fall Risk Assessment was completed, and patient is at LOW risk for falls.Assessment completed on:1/5/2022    Depression Screening:  PHQ-2/PHQ-9 Depression Screening 7/20/2022   Retired PHQ-9 Total Score -   Retired Total Score -   Little Interest or Pleasure in Doing Things 0-->not at all   Feeling Down, Depressed or Hopeless 0-->not at all   PHQ-9: Brief Depression Severity Measure Score 0       Health Habits and Functional and Cognitive Screening:  Functional  & Cognitive Status 7/20/2022   Do you have difficulty preparing food and eating? No   Do you have difficulty bathing yourself, getting dressed or grooming yourself? No   Do you have difficulty using the toilet? No   Do you have difficulty moving around from place to place? No   Do you have trouble with steps or getting out of a bed or a chair? No   Current Diet Well Balanced Diet   Dental Exam Up to date   Eye Exam Up to date   Exercise (times per week) 0 times per week   Current Exercises Include No Regular Exercise   Current Exercise Activities Include -   Do you need help using the phone?  No   Are you deaf or do you have serious difficulty hearing?  Yes   Do you need help with transportation? No   Do you need help shopping? No   Do you need help preparing meals?  No   Do you need help with housework?  No   Do you need help with laundry? No   Do you need help taking your medications? No   Do you need help managing money? No   Do you ever drive or ride in a car without wearing a seat belt? No   Have you felt unusual stress, anger or loneliness in the last month? No   Who do you live with? Spouse   If you need help, do you have trouble finding someone available to you? No   Have you been bothered in the last four weeks by sexual problems? No   Do you have difficulty concentrating, remembering or making decisions? No       Age-appropriate Screening Schedule:  Refer to the list below for future screening recommendations based on patient's age, sex and/or medical conditions. Orders for these recommended tests are listed in the plan section. The patient has been provided with a written plan.    Health Maintenance   Topic Date Due   • URINE MICROALBUMIN  Never done   • TDAP/TD VACCINES (1 - Tdap) Never done   • ZOSTER VACCINE (1 of 2) Never done   • INFLUENZA VACCINE  10/01/2022   • DIABETIC EYE EXAM  10/15/2022   • HEMOGLOBIN A1C  01/20/2023   • LIPID PANEL  07/20/2023              Assessment & Plan   CMS Preventative  Services Quick Reference  Risk Factors Identified During Encounter  Obesity/Overweight   The above risks/problems have been discussed with the patient.  Follow up actions/plans if indicated are seen below in the Assessment/Plan Section.  Pertinent information has been shared with the patient in the After Visit Summary.    Diagnoses and all orders for this visit:    1. Encounter for subsequent annual wellness visit (AWV) in Medicare patient (Primary)    2. Mixed hyperlipidemia  -     TSH  -     Lipid Panel    3. Type 2 diabetes mellitus with diabetic neuropathy, without long-term current use of insulin (HCC)  -     POC Glycated Hemoglobin, Total              Monitor blood sugar routinely.    4. Primary hypertension  -     Comprehensive Metabolic Panel  -     Uric Acid    5. Claudication of both lower extremities (HCC)  -     US Ankle / Brachial Indices Extremity Complete; Future    6. Encounter for long-term (current) use of other medications  -     CBC & Differential      The patient is going to continue current medications. We are going to do BRAYDEN bilateral lower extremities. He is going to monitor his blood sugars at home at least twice a day. He is going to monitor blood pressure with a goal 130/80 or less in mind. I have asked him to be as active as he can, be walked as much as he is able to. When his legs start hurting, then he can sit and rest them. He is going to follow up in 3 months unless he has a problem.    Follow Up:   Return in about 3 months (around 10/20/2022).     An After Visit Summary and PPPS were made available to the patient.                   Transcribed from ambient dictation for Nieves Young DO by TOM JUSTICE.  07/20/22   14:31 CDT    Patient verbalized consent to the visit recording.

## 2022-07-21 DIAGNOSIS — R79.89 ABNORMAL TSH: ICD-10-CM

## 2022-07-21 DIAGNOSIS — E03.9 ACQUIRED HYPOTHYROIDISM: ICD-10-CM

## 2022-07-21 LAB
ALBUMIN SERPL-MCNC: 4.4 G/DL (ref 3.5–5.2)
ALBUMIN/GLOB SERPL: 1.9 G/DL
ALP SERPL-CCNC: 62 U/L (ref 39–117)
ALT SERPL-CCNC: 21 U/L (ref 1–41)
AST SERPL-CCNC: 21 U/L (ref 1–40)
BASOPHILS # BLD AUTO: 0.07 10*3/MM3 (ref 0–0.2)
BASOPHILS NFR BLD AUTO: 1 % (ref 0–1.5)
BILIRUB SERPL-MCNC: 0.5 MG/DL (ref 0–1.2)
BUN SERPL-MCNC: 17 MG/DL (ref 8–23)
BUN/CREAT SERPL: 16.5 (ref 7–25)
CALCIUM SERPL-MCNC: 10.1 MG/DL (ref 8.6–10.5)
CHLORIDE SERPL-SCNC: 102 MMOL/L (ref 98–107)
CHOLEST SERPL-MCNC: 127 MG/DL (ref 0–200)
CO2 SERPL-SCNC: 27.7 MMOL/L (ref 22–29)
CREAT SERPL-MCNC: 1.03 MG/DL (ref 0.76–1.27)
EGFRCR SERPLBLD CKD-EPI 2021: 71.2 ML/MIN/1.73
EOSINOPHIL # BLD AUTO: 0.24 10*3/MM3 (ref 0–0.4)
EOSINOPHIL NFR BLD AUTO: 3.4 % (ref 0.3–6.2)
ERYTHROCYTE [DISTWIDTH] IN BLOOD BY AUTOMATED COUNT: 13.3 % (ref 12.3–15.4)
GLOBULIN SER CALC-MCNC: 2.3 GM/DL
GLUCOSE SERPL-MCNC: 95 MG/DL (ref 65–99)
HCT VFR BLD AUTO: 42.4 % (ref 37.5–51)
HDLC SERPL-MCNC: 42 MG/DL (ref 40–60)
HGB BLD-MCNC: 14.3 G/DL (ref 13–17.7)
IMM GRANULOCYTES # BLD AUTO: 0.03 10*3/MM3 (ref 0–0.05)
IMM GRANULOCYTES NFR BLD AUTO: 0.4 % (ref 0–0.5)
LDLC SERPL CALC-MCNC: 61 MG/DL (ref 0–100)
LYMPHOCYTES # BLD AUTO: 2.08 10*3/MM3 (ref 0.7–3.1)
LYMPHOCYTES NFR BLD AUTO: 29.5 % (ref 19.6–45.3)
MCH RBC QN AUTO: 30.1 PG (ref 26.6–33)
MCHC RBC AUTO-ENTMCNC: 33.7 G/DL (ref 31.5–35.7)
MCV RBC AUTO: 89.3 FL (ref 79–97)
MONOCYTES # BLD AUTO: 0.98 10*3/MM3 (ref 0.1–0.9)
MONOCYTES NFR BLD AUTO: 13.9 % (ref 5–12)
NEUTROPHILS # BLD AUTO: 3.66 10*3/MM3 (ref 1.7–7)
NEUTROPHILS NFR BLD AUTO: 51.8 % (ref 42.7–76)
NRBC BLD AUTO-RTO: 0 /100 WBC (ref 0–0.2)
PLATELET # BLD AUTO: 232 10*3/MM3 (ref 140–450)
POTASSIUM SERPL-SCNC: 4.9 MMOL/L (ref 3.5–5.2)
PROT SERPL-MCNC: 6.7 G/DL (ref 6–8.5)
RBC # BLD AUTO: 4.75 10*6/MM3 (ref 4.14–5.8)
SODIUM SERPL-SCNC: 140 MMOL/L (ref 136–145)
TRIGL SERPL-MCNC: 136 MG/DL (ref 0–150)
TSH SERPL DL<=0.005 MIU/L-ACNC: 5.75 UIU/ML (ref 0.27–4.2)
URATE SERPL-MCNC: 4.2 MG/DL (ref 3.4–7)
VLDLC SERPL CALC-MCNC: 24 MG/DL (ref 5–40)
WBC # BLD AUTO: 7.06 10*3/MM3 (ref 3.4–10.8)

## 2022-07-21 RX ORDER — MELOXICAM 15 MG/1
TABLET ORAL
Qty: 90 TABLET | Refills: 3 | Status: SHIPPED | OUTPATIENT
Start: 2022-07-21 | End: 2022-11-30

## 2022-07-22 LAB
Lab: NORMAL
T4 SERPL-MCNC: 7.7 UG/DL (ref 4.5–12)
WRITTEN AUTHORIZATION: NORMAL

## 2022-07-28 ENCOUNTER — HOSPITAL ENCOUNTER (OUTPATIENT)
Dept: ULTRASOUND IMAGING | Facility: HOSPITAL | Age: 87
Discharge: HOME | End: 2022-07-28
Admitting: FAMILY MEDICINE

## 2022-07-28 DIAGNOSIS — I73.9 CLAUDICATION OF BOTH LOWER EXTREMITIES: ICD-10-CM

## 2022-07-28 PROCEDURE — 93923 UPR/LXTR ART STDY 3+ LVLS: CPT

## 2022-07-29 DIAGNOSIS — G62.9 NEUROPATHY: ICD-10-CM

## 2022-07-29 RX ORDER — GABAPENTIN 300 MG/1
CAPSULE ORAL
Qty: 270 CAPSULE | Refills: 0 | OUTPATIENT
Start: 2022-07-29

## 2022-08-22 RX ORDER — LEVOTHYROXINE SODIUM 0.05 MG/1
50 TABLET ORAL DAILY
Qty: 90 TABLET | Refills: 3 | Status: SHIPPED | OUTPATIENT
Start: 2022-08-22

## 2022-08-22 RX ORDER — ATENOLOL 50 MG/1
50 TABLET ORAL DAILY
Qty: 90 TABLET | Refills: 3 | Status: SHIPPED | OUTPATIENT
Start: 2022-08-22

## 2022-08-22 NOTE — TELEPHONE ENCOUNTER
Caller: erwin cornelius    Relationship: Emergency Contact    Best call back number: [phone]    Requested Prescriptions:   Requested Prescriptions     Pending Prescriptions Disp Refills   • atenolol (TENORMIN) 50 MG tablet 90 tablet 3     Sig: Take 1 tablet by mouth Daily.   • levothyroxine (SYNTHROID, LEVOTHROID) 50 MCG tablet 90 tablet 3     Sig: Take 1 tablet by mouth Daily.        Pharmacy where request should be sent: [facility] [phone] [facility] [phone]      Additional details provided by patient:   N/A    Does the patient have less than a 3 day supply:  [] Yes  [x] No    Valencia Monroe, PCT   08/22/22 09:19 CDT       
Patient expressed no known problems or needs

## 2022-09-28 ENCOUNTER — OFFICE VISIT (OUTPATIENT)
Dept: CARDIOLOGY | Facility: CLINIC | Age: 87
End: 2022-09-28

## 2022-09-28 VITALS
SYSTOLIC BLOOD PRESSURE: 138 MMHG | HEART RATE: 66 BPM | WEIGHT: 230 LBS | DIASTOLIC BLOOD PRESSURE: 88 MMHG | OXYGEN SATURATION: 99 % | HEIGHT: 74 IN | BODY MASS INDEX: 29.52 KG/M2

## 2022-09-28 DIAGNOSIS — I25.10 CORONARY ARTERY DISEASE INVOLVING NATIVE CORONARY ARTERY OF NATIVE HEART WITHOUT ANGINA PECTORIS: Primary | ICD-10-CM

## 2022-09-28 DIAGNOSIS — E78.2 MIXED HYPERLIPIDEMIA: ICD-10-CM

## 2022-09-28 DIAGNOSIS — I10 PRIMARY HYPERTENSION: ICD-10-CM

## 2022-09-28 PROCEDURE — 93000 ELECTROCARDIOGRAM COMPLETE: CPT | Performed by: INTERNAL MEDICINE

## 2022-09-28 PROCEDURE — 99213 OFFICE O/P EST LOW 20 MIN: CPT | Performed by: INTERNAL MEDICINE

## 2022-09-28 RX ORDER — GLIPIZIDE 10 MG/1
10 TABLET ORAL
COMMUNITY
End: 2022-10-26 | Stop reason: SDUPTHER

## 2022-09-28 NOTE — PROGRESS NOTES
Subjective:     Encounter Date:09/28/2022      Patient ID: Sarkis Naranjo is a 86 y.o. male with coronary artery disease, status post remote history of PCI to the diagonal branch, also with hypertension, hyperlipidemia, type 2 diabetes mellitus who presents today for routine follow-up.    Chief Complaint: Here today for follow-up of coronary artery disease    History of Present Illness    This patient presents today for routine follow-up of coronary artery disease.  He says that he has been doing well.  He denies having any chest pain at this time.  He has very mild shortness of breath with moderate exertion that he describes as being stable and unchanged.  No orthopnea, PND, edema, cough, wheezing.  No palpitations, lightheadedness, dizziness or syncope.  His blood pressure has been well controlled.  He also reports good control of his cholesterol.  No side effects from current medications.  Overall, from a cardiac standpoint, he says that he seems to be doing well at this time.      Current Outpatient Medications:   •  amLODIPine (NORVASC) 2.5 MG tablet, TAKE 1 TABLET DAILY., Disp: 90 tablet, Rfl: 3  •  aspirin 81 MG EC tablet, Take 81 mg by mouth Daily., Disp: , Rfl:   •  atenolol (TENORMIN) 50 MG tablet, Take 1 tablet by mouth Daily., Disp: 90 tablet, Rfl: 3  •  atorvastatin (LIPITOR) 20 MG tablet, Take 1 tablet by mouth Daily., Disp: 90 tablet, Rfl: 3  •  Calcium Carb-Cholecalciferol 250-125 MG-UNIT tablet, Take 1 tablet by mouth Daily., Disp: , Rfl:   •  Cinnamon 500 MG capsule, Take 1,000 mg by mouth Daily., Disp: , Rfl:   •  dutasteride (AVODART) 0.5 MG capsule, Take 0.5 mg by mouth Daily., Disp: , Rfl:   •  glipizide (GLUCOTROL) 10 MG tablet, Take 10 mg by mouth 2 (Two) Times a Day Before Meals., Disp: , Rfl:   •  glucose blood test strip, 1 each by Other route 2 (Two) Times a Day. Use as instructed, Disp: 100 each, Rfl: 5  •  Insulin Glargine (LANTUS SOLOSTAR) 100 UNIT/ML injection pen, Inject 20 Units  "under the skin into the appropriate area as directed Daily., Disp: 6 pen, Rfl: 5  •  Insulin Pen Needle (PEN NEEDLES 3/16\") 31G X 5 MM misc, 1 each Daily., Disp: 90 each, Rfl: 3  •  levothyroxine (SYNTHROID, LEVOTHROID) 50 MCG tablet, Take 1 tablet by mouth Daily., Disp: 90 tablet, Rfl: 3  •  meloxicam (MOBIC) 15 MG tablet, TAKE 1 TABLET DAILY., Disp: 90 tablet, Rfl: 3  •  metFORMIN (Glumetza) 1000 MG (MOD) 24 hr tablet, Take 1 tablet by mouth 2 (Two) Times a Day With Meals., Disp: 180 tablet, Rfl: 3  •  Multiple Vitamins-Minerals (MULTIVITAMIN WITH MINERALS) tablet tablet, Take 1 tablet by mouth Daily., Disp: , Rfl:   •  Omega-3 Fatty Acids (FISH OIL) 1000 MG capsule capsule, Take 1,000 mg by mouth Daily With Breakfast., Disp: , Rfl:   •  tamsulosin (FLOMAX) 0.4 MG capsule 24 hr capsule, Take 1 capsule by mouth 2 (Two) Times a Day., Disp: , Rfl:   •  Trulicity 1.5 MG/0.5ML solution pen-injector, Inject 1.5 mg under the skin into the appropriate area as directed 1 (One) Time Per Week., Disp: 2 mL, Rfl: 5  •  TURMERIC PO, Take 550 mg by mouth Daily., Disp: , Rfl:     No Known Allergies    Social History     Tobacco Use   • Smoking status: Never Smoker   • Smokeless tobacco: Never Used   Substance Use Topics   • Alcohol use: Yes     Comment: occ     Review of Systems   Constitutional: Negative for fever and weight loss.   Cardiovascular: Positive for dyspnea on exertion. Negative for chest pain, leg swelling, orthopnea, palpitations, paroxysmal nocturnal dyspnea and syncope.   Respiratory: Positive for shortness of breath. Negative for cough and wheezing.    Hematologic/Lymphatic: Negative for adenopathy and bleeding problem.   Gastrointestinal: Negative for abdominal pain, nausea and vomiting.   Neurological: Negative for dizziness, headaches and loss of balance.       ECG 12 Lead    Date/Time: 9/28/2022 5:01 PM  Performed by: Ezekiel Heath MD  Authorized by: Ezekiel Heath MD   Comparison: " "compared with previous ECG from 9/22/2021  Similar to previous ECG  Rhythm: sinus rhythm  Rate: normal  BPM: 65  Conduction: 1st degree AV block  ST Segments: ST segments normal  T Waves: T waves normal  QRS axis: normal    Clinical impression: normal ECG             Objective:     Vitals reviewed.   Constitutional:       General: Not in acute distress.     Appearance: Well-developed and not in distress.   Eyes:      Extraocular Movements: Extraocular movements intact.   HENT:      Head: Normocephalic and atraumatic.   Neck:      Thyroid: No thyroid mass.      Vascular: No JVD.   Pulmonary:      Effort: Pulmonary effort is normal.      Breath sounds: Normal breath sounds. No wheezing. No rhonchi. No rales.   Cardiovascular:      Normal rate. Regular rhythm.      Murmurs: There is no murmur.      No gallop.   Pulses:     Intact distal pulses.   Edema:     Peripheral edema absent.   Abdominal:      General: Bowel sounds are normal. There is no distension.      Palpations: Abdomen is soft.      Tenderness: There is no abdominal tenderness.   Skin:     General: Skin is warm and dry. There is no cyanosis.      Findings: No erythema or rash.   Neurological:      Mental Status: Alert and oriented to person, place, and time.      Cranial Nerves: No cranial nerve deficit.       /88   Pulse 66   Ht 188 cm (74\")   Wt 104 kg (230 lb)   SpO2 99%   BMI 29.53 kg/m²     Data/Lab Review:     Lab Results   Component Value Date    CHLPL 127 07/20/2022    TRIG 136 07/20/2022    HDL 42 07/20/2022    LDL 61 07/20/2022     Lab Results   Component Value Date    TSH 5.750 (H) 07/20/2022     Lab Results   Component Value Date    GLUCOSE 95 07/20/2022    CALCIUM 10.1 07/20/2022     07/20/2022    K 4.9 07/20/2022    CO2 27.7 07/20/2022     07/20/2022    BUN 17 07/20/2022    CREATININE 1.03 07/20/2022    EGFRIFAFRI 84 05/05/2021    EGFRIFNONA 70 05/05/2021    BCR 16.5 07/20/2022    ANIONGAP 14 06/05/2020       "   Assessment:          Diagnosis Plan   1. Coronary artery disease involving native coronary artery of native heart without angina pectoris  ECG 12 Lead   2. Primary hypertension     3. Mixed hyperlipidemia          Plan:       1.  Coronary artery disease: The patient remains stable and does not describe any ischemic symptoms.  Continue aspirin, beta-blocker, statin therapies.     2.  Essential hypertension: Blood pressure remains well controlled at this time.  Continue current medications.     3.  Mixed hyperlipidemia:  Cholesterol is well controlled with LDL cholesterol at goal.  Continue current dose of statin therapy.     We will see the patient back in 12 months unless otherwise needed sooner.

## 2022-10-21 RX ORDER — ATORVASTATIN CALCIUM 20 MG/1
20 TABLET, FILM COATED ORAL DAILY
Qty: 90 TABLET | Refills: 3 | Status: SHIPPED | OUTPATIENT
Start: 2022-10-21

## 2022-10-21 RX ORDER — AMLODIPINE BESYLATE 2.5 MG/1
2.5 TABLET ORAL DAILY
Qty: 90 TABLET | Refills: 3 | Status: SHIPPED | OUTPATIENT
Start: 2022-10-21

## 2022-10-21 NOTE — TELEPHONE ENCOUNTER
Caller: erwin cornelius    Relationship: Emergency Contact    Best call back number: [phone]    Requested Prescriptions:   Requested Prescriptions     Pending Prescriptions Disp Refills   • atorvastatin (LIPITOR) 20 MG tablet 90 tablet 3     Sig: Take 1 tablet by mouth Daily.   • amLODIPine (NORVASC) 2.5 MG tablet 90 tablet 3     Sig: Take 1 tablet by mouth Daily.        Pharmacy where request should be sent: [address] [phone] [facility] [phone]      Additional details provided by patient: ONE DAY LEFT     Does the patient have less than a 3 day supply:  [x] Yes  [] No    Doyle Khan Rep   10/21/22 10:04 CDT

## 2022-10-26 ENCOUNTER — OFFICE VISIT (OUTPATIENT)
Dept: INTERNAL MEDICINE | Facility: CLINIC | Age: 87
End: 2022-10-26

## 2022-10-26 VITALS
DIASTOLIC BLOOD PRESSURE: 80 MMHG | HEIGHT: 74 IN | OXYGEN SATURATION: 100 % | BODY MASS INDEX: 29.54 KG/M2 | SYSTOLIC BLOOD PRESSURE: 130 MMHG | TEMPERATURE: 97.3 F | WEIGHT: 230.2 LBS | HEART RATE: 81 BPM

## 2022-10-26 DIAGNOSIS — E03.9 ACQUIRED HYPOTHYROIDISM: ICD-10-CM

## 2022-10-26 DIAGNOSIS — E78.2 MIXED HYPERLIPIDEMIA: ICD-10-CM

## 2022-10-26 DIAGNOSIS — I10 PRIMARY HYPERTENSION: ICD-10-CM

## 2022-10-26 DIAGNOSIS — E11.40 TYPE 2 DIABETES MELLITUS WITH DIABETIC NEUROPATHY, WITHOUT LONG-TERM CURRENT USE OF INSULIN: Primary | ICD-10-CM

## 2022-10-26 LAB — HBA1C MFR BLD: 7.3 %

## 2022-10-26 PROCEDURE — 3051F HG A1C>EQUAL 7.0%<8.0%: CPT

## 2022-10-26 PROCEDURE — 99214 OFFICE O/P EST MOD 30 MIN: CPT

## 2022-10-26 PROCEDURE — 83036 HEMOGLOBIN GLYCOSYLATED A1C: CPT

## 2022-10-26 RX ORDER — GLIPIZIDE 10 MG/1
10 TABLET ORAL
Qty: 90 TABLET | Refills: 3 | Status: SHIPPED | OUTPATIENT
Start: 2022-10-26

## 2022-10-26 NOTE — PROGRESS NOTES
"Chief Complaint  Diabetes (3 mo f/u, brought BG readings, a1c: 7.3), Hyperlipidemia, Hypertension, and Foot Pain (Bilateral foot pain feels like he is walking on needles, sometimes numbness/tingling, feels this on the bottom front part of the foot, used to take Gabapentin but did not improve sx's)    Subjective        Sarkis Naranjo presents to [facility]  History of Present Illness  3-month follow-up on diabetes, hyperlipidemia, hypertension; bilateral foot tingling, feels like pins-and-needles, intermittent problem      Objective   Vital Signs:  /80 (BP Location: Left arm, Patient Position: Sitting, Cuff Size: Adult)   Pulse 81   Temp 97.3 °F (36.3 °C) (Temporal)   Ht 188 cm (74\")   Wt 104 kg (230 lb 3.2 oz)   SpO2 100%   BMI 29.56 kg/m²   Estimated body mass index is 29.56 kg/m² as calculated from the following:    Height as of this encounter: 188 cm (74\").    Weight as of this encounter: 104 kg (230 lb 3.2 oz).          Physical Exam  Vitals and nursing note reviewed.   Constitutional:       Appearance: Normal appearance.   HENT:      Head: Normocephalic and atraumatic.      Right Ear: External ear normal.      Left Ear: External ear normal.      Nose: Nose normal.   Eyes:      Extraocular Movements: Extraocular movements intact.      Conjunctiva/sclera: Conjunctivae normal.      Pupils: Pupils are equal, round, and reactive to light.   Cardiovascular:      Rate and Rhythm: Normal rate and regular rhythm.      Pulses: Normal pulses.      Heart sounds: Normal heart sounds.   Pulmonary:      Effort: Pulmonary effort is normal.      Breath sounds: Normal breath sounds.   Skin:     General: Skin is warm and dry.   Neurological:      General: No focal deficit present.      Mental Status: He is alert and oriented to person, place, and time. Mental status is at baseline.      Sensory: Sensation is intact. No sensory deficit.      Motor: No weakness.      Comments: Reports " occasional/intermittent peripheral neuropathy to bilateral bottoms of feet   Psychiatric:         Mood and Affect: Mood normal.         Behavior: Behavior normal.         Judgment: Judgment normal.        Result Review :                Assessment and Plan   Diagnoses and all orders for this visit:    1. Type 2 diabetes mellitus with diabetic neuropathy, without long-term current use of insulin (HCC) (Primary)  -     POC Glycated Hemoglobin, Total  -     glipizide (GLUCOTROL) 10 MG tablet; Take 1 tablet by mouth 2 (Two) Times a Day Before Meals.  Dispense: 90 tablet; Refill: 3  -     Comprehensive metabolic panel; Future  -     Hemoglobin A1c; Future    2. Mixed hyperlipidemia  -     Lipid panel; Future    3. Acquired hypothyroidism  -     TSH Rfx On Abnormal To Free T4; Future    4. Primary hypertension    Patient is seen today with his wife following up for diabetes, hyperlipidemia, and hypertension.  States they are aware that lab work was ordered for this visit, however they were unable to have it completed.  I have encouraged him to have this completed at his convenience.  Labs from July reveal a lipid panel that is within normal limits, hemoglobin A1c today is 7.3 in office which is the same as last time, blood pressure in office today is satisfactory at 130/80.  We will have patient continue medications as prescribed, he has provided us with a blood sugar log which will be scanned into the chart.  Overall readings appear fairly stable with a few outliers which patient states are due to his diet, I have provided education regarding this.  I encouraged a low-sodium, low carbohydrate diet and regular daily exercise.  Patient requesting to have lab order printed so that he may go to [facility]'s lab to have these completed, we will do this for him today.    Patient states he has recently started experiencing increasing peripheral neuropathy of bilateral bottoms of feet, which he states he has history of.  States he  used to take 300 mg gabapentin 3 times a day but stopped taking it back in April.  States he still has several pills left and will resume this, states he does not want a another prescription at this time for gabapentin but will let us know if he does.    Patient requesting flu vaccine today, will perform in office.    Plan:  1.  Flu vaccine given in office today.  2.  Low-sodium diet, low carbohydrate diet, regular daily exercise.  3.  Have lab work completed prior to next visit.  4.  Follow-up in 3 months for recheck.  5.  Continue medications as prescribed.  Patient is resuming gabapentin and will let us know how this is working at next visit.         Follow Up   Return in about 3 months (around 1/26/2023) for Recheck.  Patient was given instructions and counseling regarding his condition or for health maintenance advice. Please see specific information pulled into the AVS if appropriate.

## 2022-11-30 DIAGNOSIS — E11.40 TYPE 2 DIABETES MELLITUS WITH DIABETIC NEUROPATHY, WITHOUT LONG-TERM CURRENT USE OF INSULIN: ICD-10-CM

## 2022-12-01 RX ORDER — MELOXICAM 15 MG/1
TABLET ORAL
Qty: 30 TABLET | Refills: 5 | Status: SHIPPED | OUTPATIENT
Start: 2022-12-01

## 2022-12-01 RX ORDER — DULAGLUTIDE 1.5 MG/.5ML
1.5 INJECTION, SOLUTION SUBCUTANEOUS WEEKLY
Qty: 2 ML | Refills: 5 | Status: SHIPPED | OUTPATIENT
Start: 2022-12-01

## 2022-12-09 RX ORDER — METFORMIN HYDROCHLORIDE 1000 MG/1
TABLET, FILM COATED, EXTENDED RELEASE ORAL
Qty: 60 TABLET | Refills: 5 | Status: SHIPPED | OUTPATIENT
Start: 2022-12-09

## 2023-01-18 LAB
ALBUMIN SERPL-MCNC: 4.3 G/DL (ref 3.5–5.2)
ALP BLD-CCNC: 65 U/L (ref 40–130)
ALT SERPL-CCNC: 21 U/L (ref 5–41)
ANION GAP SERPL CALCULATED.3IONS-SCNC: 11 MMOL/L (ref 7–19)
AST SERPL-CCNC: 20 U/L (ref 5–40)
BILIRUB SERPL-MCNC: 0.6 MG/DL (ref 0.2–1.2)
BUN BLDV-MCNC: 19 MG/DL (ref 8–23)
CALCIUM SERPL-MCNC: 9.8 MG/DL (ref 8.8–10.2)
CHLORIDE BLD-SCNC: 102 MMOL/L (ref 98–111)
CHOLESTEROL, TOTAL: 122 MG/DL (ref 160–199)
CO2: 26 MMOL/L (ref 22–29)
CREAT SERPL-MCNC: 1.1 MG/DL (ref 0.5–1.2)
GFR SERPL CREATININE-BSD FRML MDRD: >60 ML/MIN/{1.73_M2}
GLUCOSE BLD-MCNC: 115 MG/DL (ref 74–109)
HBA1C MFR BLD: 7.2 % (ref 4–6)
HDLC SERPL-MCNC: 47 MG/DL (ref 55–121)
LDL CHOLESTEROL CALCULATED: 54 MG/DL
POTASSIUM SERPL-SCNC: 5 MMOL/L (ref 3.5–5)
SODIUM BLD-SCNC: 139 MMOL/L (ref 136–145)
T4 FREE: 1.09 NG/DL (ref 0.93–1.7)
TOTAL PROTEIN: 6.9 G/DL (ref 6.6–8.7)
TRIGL SERPL-MCNC: 105 MG/DL (ref 0–149)
TSH REFLEX FT4: 6.09 UIU/ML (ref 0.35–5.5)

## 2023-01-26 ENCOUNTER — OFFICE VISIT (OUTPATIENT)
Dept: INTERNAL MEDICINE | Facility: CLINIC | Age: 88
End: 2023-01-26
Payer: MEDICARE

## 2023-01-26 VITALS
OXYGEN SATURATION: 99 % | HEIGHT: 74 IN | DIASTOLIC BLOOD PRESSURE: 80 MMHG | HEART RATE: 63 BPM | SYSTOLIC BLOOD PRESSURE: 124 MMHG | WEIGHT: 231 LBS | TEMPERATURE: 96.9 F | BODY MASS INDEX: 29.65 KG/M2

## 2023-01-26 DIAGNOSIS — E03.9 ACQUIRED HYPOTHYROIDISM: ICD-10-CM

## 2023-01-26 DIAGNOSIS — I10 PRIMARY HYPERTENSION: Chronic | ICD-10-CM

## 2023-01-26 DIAGNOSIS — E11.40 TYPE 2 DIABETES MELLITUS WITH DIABETIC NEUROPATHY, WITHOUT LONG-TERM CURRENT USE OF INSULIN: Primary | ICD-10-CM

## 2023-01-26 DIAGNOSIS — E78.2 MIXED HYPERLIPIDEMIA: Chronic | ICD-10-CM

## 2023-01-26 PROCEDURE — 99214 OFFICE O/P EST MOD 30 MIN: CPT | Performed by: INTERNAL MEDICINE

## 2023-01-26 RX ORDER — GABAPENTIN 300 MG/1
300 CAPSULE ORAL 3 TIMES DAILY
Qty: 90 CAPSULE | Refills: 5 | Status: SHIPPED | OUTPATIENT
Start: 2023-01-26

## 2023-01-29 PROBLEM — E78.5 HYPERLIPIDEMIA: Chronic | Status: ACTIVE | Noted: 2019-09-16

## 2023-01-29 PROBLEM — E11.40 TYPE 2 DIABETES MELLITUS WITH DIABETIC NEUROPATHY, WITHOUT LONG-TERM CURRENT USE OF INSULIN (HCC): Chronic | Status: ACTIVE | Noted: 2020-03-10

## 2023-01-29 PROBLEM — L30.9 ECZEMA: Status: ACTIVE | Noted: 2023-01-29

## 2023-01-29 PROBLEM — C67.9 BLADDER CANCER: Chronic | Status: ACTIVE | Noted: 2018-11-14

## 2023-01-29 PROBLEM — K64.4 EXTERNAL HEMORRHOID: Status: RESOLVED | Noted: 2020-03-10 | Resolved: 2023-01-29

## 2023-01-29 PROBLEM — I10 HYPERTENSION: Chronic | Status: ACTIVE | Noted: 2019-09-16

## 2023-01-29 PROBLEM — E03.9 ACQUIRED HYPOTHYROIDISM: Chronic | Status: ACTIVE | Noted: 2018-11-14

## 2023-01-29 NOTE — ASSESSMENT & PLAN NOTE
Hypertension is improving with treatment.  Continue current treatment regimen.  Dietary sodium restriction.  Regular aerobic exercise.  Continue current medications.  Blood pressure will be reassessed at the next regular appointment.    BP target <130/80

## 2023-01-29 NOTE — ASSESSMENT & PLAN NOTE
Lipid abnormalities are improving with treatment.  Pharmacotherapy as ordered.  Lipids will be reassessed in 6 months.    Continue atorvastatin, tolerating it well.

## 2023-01-29 NOTE — ASSESSMENT & PLAN NOTE
Patient has a history of hypothyroidism    Signs and symptoms:   denies fatigue, weight changes, heat/cold intolerance, bowel/skin changes or CVS symptoms    Clinically patient is euthyroid and chemically the patient is euthyroid    Labs reviewed include:  Lab Results - Last 18 Months   Lab Units 01/18/23  0709 07/20/22  1023   TSH uIU/mL  --  5.750*   FREE T4 ng/dL 1.09  --

## 2023-01-29 NOTE — PROGRESS NOTES
"      Chief Complaint  Hypertension (3 month follow up ), Diabetes, discuss medication (Pt had been previously on Gabapentin 300mg TID, he stopped taking it because he felt it did not work.  Now he has decided that it does work and he needs to start taking again.  Needs new script if ok ), and dry leg (Left leg; dry skin )    Subjective        Sarkis Naranjo presents to [facility]    HPI    Seen for above problems.  See below for problem based HPI specifics    Dry skin present recently despite trial of various OTC lotions, recommended cream or ointment.    No pain or discomfort at present.  Neuropathy has returned, patient did a trial off gabapentin without improvement.    Review of Systems   Constitutional: Negative for chills and fever.   Respiratory: Negative for cough and shortness of breath.    Cardiovascular: Negative for chest pain and palpitations.   Neurological:        Neuropathy       Objective   Vital Signs:  /80 (BP Location: Left arm, Patient Position: Sitting, Cuff Size: Adult)   Pulse 63   Temp 96.9 °F (36.1 °C) (Temporal)   Ht 188 cm (74\")   Wt 105 kg (231 lb)   SpO2 99%   BMI 29.66 kg/m²   Estimated body mass index is 29.66 kg/m² as calculated from the following:    Height as of this encounter: 188 cm (74\").    Weight as of this encounter: 105 kg (231 lb).      Physical Exam  Vitals reviewed.   Cardiovascular:      Rate and Rhythm: Normal rate and regular rhythm.      Heart sounds: Murmur heard.   Pulmonary:      Effort: Pulmonary effort is normal. No respiratory distress.   Skin:     Comments: Venous stasis changes biltaterally; dry skin on medial left leg worst spot   Neurological:      Mental Status: He is alert.                     Assessment and Plan   Diagnoses and all orders for this visit:    1. Type 2 diabetes mellitus with diabetic neuropathy, without long-term current use of insulin (HCC) (Primary)  Assessment & Plan:  Blood sugars stable. "     Worsening neuropathy.  Trial off of gabapentin did not work.  Patient would like to go back on it.  Will trial.    Orders:  -     gabapentin (NEURONTIN) 300 MG capsule; Take 1 capsule by mouth 3 (Three) Times a Day.  Dispense: 90 capsule; Refill: 5    2. Primary hypertension  Assessment & Plan:  Hypertension is improving with treatment.  Continue current treatment regimen.  Dietary sodium restriction.  Regular aerobic exercise.  Continue current medications.  Blood pressure will be reassessed at the next regular appointment.    BP target <130/80      3. Mixed hyperlipidemia  Assessment & Plan:  Lipid abnormalities are improving with treatment.  Pharmacotherapy as ordered.  Lipids will be reassessed in 6 months.    Continue atorvastatin, tolerating it well.      4. Acquired hypothyroidism  Assessment & Plan:  Patient has a history of hypothyroidism    Signs and symptoms:   denies fatigue, weight changes, heat/cold intolerance, bowel/skin changes or CVS symptoms    Clinically patient is euthyroid and chemically the patient is euthyroid    Labs reviewed include:  Lab Results - Last 18 Months   Lab Units 01/18/23  0709 07/20/22  1023   TSH uIU/mL  --  5.750*   FREE T4 ng/dL 1.09  --                Result Review :  The following data was reviewed by: Vasu Brown MD on 01/26/2023:  CMP    CMP 4/6/22 7/20/22   Glucose 85 95   BUN 15 17   Creatinine 0.97 1.03   Sodium 142 140   Potassium 4.7 4.9   Chloride 104 102   Calcium 10.1 10.1   Total Protein 7.3 6.7   Albumin 4.2 4.40   Globulin 3.1 2.3   Total Bilirubin 0.7 0.5   Alkaline Phosphatase 64 62   AST (SGOT) 23 21   ALT (SGPT) 24 21   BUN/Creatinine Ratio 15 16.5           CBC    CBC 7/20/22   WBC 7.06   RBC 4.75   Hemoglobin 14.3   Hematocrit 42.4   MCV 89.3   MCH 30.1   MCHC 33.7   RDW 13.3   Platelets 232           Lipid Panel    Lipid Panel 7/20/22 1/18/23   Total Cholesterol 127 122 (A)   Triglycerides 136 105   HDL Cholesterol 42 47 (A)   VLDL  Cholesterol 24    LDL Cholesterol  61 54   (A) Abnormal value       Comments are available for some flowsheets but are not being displayed.           TSH    TSH 7/20/22   TSH 5.750 (A)   (A) Abnormal value                            Follow Up   Return in about 3 months (around 4/26/2023), or if symptoms worsen or fail to improve.  Patient was given instructions and counseling regarding his condition or for health maintenance advice. Please see specific information pulled into the AVS if appropriate.       NIK Brown MD, FACP, FHM      Electronically signed by Vasu Brown MD, 01/29/23, 2:33 PM CST.

## 2023-01-29 NOTE — ASSESSMENT & PLAN NOTE
Blood sugars stable.     Worsening neuropathy.  Trial off of gabapentin did not work.  Patient would like to go back on it.  Will trial.

## 2023-02-24 ENCOUNTER — TELEPHONE (OUTPATIENT)
Dept: INTERNAL MEDICINE | Facility: CLINIC | Age: 88
End: 2023-02-24

## 2023-02-24 NOTE — TELEPHONE ENCOUNTER
Caller: Sarkis Naranjo    Relationship: Self    Best call back number:  [phone]    Requested Prescriptions:   Requested Prescriptions     Pending Prescriptions Disp Refills   • Insulin Glargine (LANTUS SOLOSTAR) 100 UNIT/ML injection pen       Sig: Inject 20 Units under the skin into the appropriate area as directed Daily.      Pharmacy where request should be sent: [facility] [phone] [facility] [phone]      Additional details provided by patient:  HAS 2 DAY SUPPLY LEFT    Does the patient have less than a 3 day supply:  [x] Yes  [] No    Would you like a call back once the refill request has been completed: [x] Yes [] No    If the office needs to give you a call back, can they leave a voicemail: [x] Yes [] No    Doyle Carlson Rep   02/24/23 09:53 CST

## 2023-04-27 ENCOUNTER — OFFICE VISIT (OUTPATIENT)
Dept: INTERNAL MEDICINE | Facility: CLINIC | Age: 88
End: 2023-04-27
Payer: MEDICARE

## 2023-04-27 VITALS
WEIGHT: 230 LBS | HEIGHT: 74 IN | OXYGEN SATURATION: 99 % | DIASTOLIC BLOOD PRESSURE: 62 MMHG | SYSTOLIC BLOOD PRESSURE: 124 MMHG | BODY MASS INDEX: 29.52 KG/M2 | TEMPERATURE: 97.1 F | HEART RATE: 68 BPM

## 2023-04-27 DIAGNOSIS — E78.2 MIXED HYPERLIPIDEMIA: ICD-10-CM

## 2023-04-27 DIAGNOSIS — E03.9 ACQUIRED HYPOTHYROIDISM: ICD-10-CM

## 2023-04-27 DIAGNOSIS — E11.40 TYPE 2 DIABETES MELLITUS WITH DIABETIC NEUROPATHY, WITHOUT LONG-TERM CURRENT USE OF INSULIN: Primary | Chronic | ICD-10-CM

## 2023-04-27 DIAGNOSIS — I10 PRIMARY HYPERTENSION: ICD-10-CM

## 2023-04-27 DIAGNOSIS — L60.3 DYSTROPHIC NAIL: ICD-10-CM

## 2023-04-27 LAB — HBA1C MFR BLD: 7.1 %

## 2023-05-01 NOTE — PROGRESS NOTES
"      Chief Complaint  Hypertension and Diabetes (A1c:  7.1)    Subjective        Sarkis Naranjo presents to [facility]    HPI    Patient here for above problems.  Doing well, no issues.    Review of Systems    Objective   Vital Signs:  /62 (BP Location: Left arm, Patient Position: Sitting, Cuff Size: Adult)   Pulse 68   Temp 97.1 °F (36.2 °C) (Temporal)   Ht 188 cm (74\")   Wt 104 kg (230 lb)   SpO2 99%   BMI 29.53 kg/m²   Estimated body mass index is 29.53 kg/m² as calculated from the following:    Height as of this encounter: 188 cm (74\").    Weight as of this encounter: 104 kg (230 lb).      Physical Exam  Vitals reviewed.   Constitutional:       Appearance: He is not ill-appearing.   HENT:      Head: Normocephalic and atraumatic.      Mouth/Throat:      Mouth: Mucous membranes are dry.      Pharynx: Oropharynx is clear.   Eyes:      General: No scleral icterus.     Conjunctiva/sclera: Conjunctivae normal.   Cardiovascular:      Rate and Rhythm: Normal rate and regular rhythm.   Pulmonary:      Effort: Pulmonary effort is normal. No respiratory distress.   Abdominal:      General: Abdomen is flat.      Palpations: Abdomen is soft.   Musculoskeletal:      Left lower leg: Left lower leg edema: dystrophic nails.   Skin:     General: Skin is warm and dry.      Coloration: Skin is not pale.   Neurological:      General: No focal deficit present.      Mental Status: He is alert and oriented to person, place, and time.   Psychiatric:         Mood and Affect: Mood normal.         Behavior: Behavior normal.                   Assessment and Plan   Diagnoses and all orders for this visit:    1. Type 2 diabetes mellitus with diabetic neuropathy, without long-term current use of insulin (HCC) (Primary)  -     POC Glycated Hemoglobin, Total  -     Hemoglobin A1c; Future  -     Microalbumin / Creatinine Urine Ratio - Urine, Clean Catch; Future    2. Primary hypertension  -     " Comprehensive Metabolic Panel; Future  -     CBC & Differential; Future  -     Urinalysis With Microscopic If Indicated (No Culture) - Urine, Clean Catch; Future    3. Mixed hyperlipidemia  -     Lipid Panel; Future    4. Acquired hypothyroidism  -     TSH Rfx On Abnormal To Free T4; Future      Discussed avoiding hypoglycemia.  A1c stable.  At target for age.  Do not want to push towards low sugars.  None noted per patient and wife.    BP well controlled.    Staying active.    Check labs prior to next visit, ordered today.      Diabetic Foot Exam Performed      Result Review :  The following data was reviewed by: Vasu Brown MD on 04/27/2023:  CMP          7/20/2022    10:23   CMP   Glucose 95     BUN 17     Creatinine 1.03     Sodium 140     Potassium 4.9     Chloride 102     Calcium 10.1     Total Protein 6.7     Albumin 4.40     Globulin 2.3     Total Bilirubin 0.5     Alkaline Phosphatase 62     AST (SGOT) 21     ALT (SGPT) 21     BUN/Creatinine Ratio 16.5       CBC          7/20/2022    10:23   CBC   WBC 7.06     RBC 4.75     Hemoglobin 14.3     Hematocrit 42.4     MCV 89.3     MCH 30.1     MCHC 33.7     RDW 13.3     Platelets 232       CBC w/diff          7/20/2022    10:23   CBC w/Diff   WBC 7.06     RBC 4.75     Hemoglobin 14.3     Hematocrit 42.4     MCV 89.3     MCH 30.1     MCHC 33.7     RDW 13.3     Platelets 232     Neutrophil Rel % 51.8     Lymphocyte Rel % 29.5     Monocyte Rel % 13.9     Eosinophil Rel % 3.4     Basophil Rel % 1.0       Lipid Panel          7/20/2022    10:23 1/18/2023    07:09   Lipid Panel   Total Cholesterol 127   122        Triglycerides 136   105        HDL Cholesterol 42   47        VLDL Cholesterol 24      LDL Cholesterol  61   54            This result is from an external source.     TSH          7/20/2022    10:23   TSH   TSH 5.750       BMP          7/20/2022    10:23   BMP   BUN 17     Creatinine 1.03     Sodium 140     Potassium 4.9     Chloride 102     CO2 27.7      Calcium 10.1       A1C Last 3 Results          10/26/2022    10:23 1/18/2023    07:09 4/27/2023    11:07   HGBA1C Last 3 Results   Hemoglobin A1C 7.3   7.2      7.1         This result is from an external source.                     Follow Up   Return in about 5 months (around 9/27/2023), or if symptoms worsen or fail to improve, for Recheck, Medicare Wellness.  Patient was given instructions and counseling regarding his condition or for health maintenance advice. Please see specific information pulled into the AVS if appropriate.       NIK Brown MD, FACP, [facility]    Trimming Dystrophic Nails of Diabetic Patient    Date/Time: 4/30/2023 7:30 PM  Performed by: Vasu Brown MD  Authorized by: Vasu Brown MD   Local anesthesia used: no    Anesthesia:  Local anesthesia used: no    Sedation:  Patient sedated: no    Patient tolerance: patient tolerated the procedure well with no immediate complications  Comments: Trimmed nails with nail nippers.  Patient unable to do so, and podiatries recently retired.  Trimmed 10 nails.  Patient diabetic and unable to trim dystrophic nails.  Used currette to debride thickened nail and callous from side of nails.        Electronically signed by Vasu Brown MD, 04/30/23, 7:27 PM CDT.

## 2023-05-16 DIAGNOSIS — E11.40 TYPE 2 DIABETES MELLITUS WITH DIABETIC NEUROPATHY, WITHOUT LONG-TERM CURRENT USE OF INSULIN: ICD-10-CM

## 2023-05-16 RX ORDER — DULAGLUTIDE 1.5 MG/.5ML
1.5 INJECTION, SOLUTION SUBCUTANEOUS WEEKLY
Qty: 2 ML | Refills: 3 | Status: SHIPPED | OUTPATIENT
Start: 2023-05-16

## 2023-05-16 RX ORDER — LEVOTHYROXINE SODIUM 0.05 MG/1
TABLET ORAL
Qty: 90 TABLET | Refills: 3 | Status: SHIPPED | OUTPATIENT
Start: 2023-05-16

## 2023-05-16 RX ORDER — METFORMIN HYDROCHLORIDE 1000 MG/1
TABLET, FILM COATED, EXTENDED RELEASE ORAL
Qty: 180 TABLET | Refills: 3 | Status: SHIPPED | OUTPATIENT
Start: 2023-05-16

## 2023-06-01 RX ORDER — ATENOLOL 50 MG/1
TABLET ORAL
Qty: 90 TABLET | Refills: 3 | Status: SHIPPED | OUTPATIENT
Start: 2023-06-01

## 2023-08-16 DIAGNOSIS — E11.40 TYPE 2 DIABETES MELLITUS WITH DIABETIC NEUROPATHY, WITHOUT LONG-TERM CURRENT USE OF INSULIN: ICD-10-CM

## 2023-08-16 RX ORDER — GABAPENTIN 300 MG/1
300 CAPSULE ORAL 3 TIMES DAILY
Qty: 90 CAPSULE | Refills: 0 | Status: SHIPPED | OUTPATIENT
Start: 2023-08-16

## 2023-08-16 NOTE — TELEPHONE ENCOUNTER
Caller: erwin cornelius    Relationship: Emergency Contact    Best call back number: [phone]     Requested Prescriptions:   Requested Prescriptions     Pending Prescriptions Disp Refills    gabapentin (NEURONTIN) 300 MG capsule 90 capsule 5     Sig: Take 1 capsule by mouth 3 (Three) Times a Day.        Pharmacy where request should be sent: [address] [phone] [facility] [phone] [facility]     Last office visit with prescribing clinician: 4/27/2023   Last telemedicine visit with prescribing clinician: Visit date not found   Next office visit with prescribing clinician: 10/11/2023     Additional details provided by patient:     Does the patient have less than a 3 day supply:  [x] Yes  [] No    Would you like a call back once the refill request has been completed: [x] Yes [] No      Doyle Lara Rep   08/16/23 09:26 CDT

## 2023-09-19 DIAGNOSIS — E11.40 TYPE 2 DIABETES MELLITUS WITH DIABETIC NEUROPATHY, WITHOUT LONG-TERM CURRENT USE OF INSULIN: ICD-10-CM

## 2023-09-19 RX ORDER — GABAPENTIN 300 MG/1
CAPSULE ORAL
Qty: 90 CAPSULE | Refills: 0 | Status: SHIPPED | OUTPATIENT
Start: 2023-09-19

## 2023-09-28 ENCOUNTER — OFFICE VISIT (OUTPATIENT)
Dept: CARDIOLOGY | Facility: CLINIC | Age: 88
End: 2023-09-28
Payer: MEDICARE

## 2023-09-28 VITALS
DIASTOLIC BLOOD PRESSURE: 68 MMHG | OXYGEN SATURATION: 98 % | HEART RATE: 64 BPM | WEIGHT: 231 LBS | SYSTOLIC BLOOD PRESSURE: 120 MMHG | BODY MASS INDEX: 29.65 KG/M2 | HEIGHT: 74 IN

## 2023-09-28 DIAGNOSIS — E78.2 MIXED HYPERLIPIDEMIA: Chronic | ICD-10-CM

## 2023-09-28 DIAGNOSIS — I25.10 CORONARY ARTERY DISEASE INVOLVING NATIVE CORONARY ARTERY OF NATIVE HEART WITHOUT ANGINA PECTORIS: Primary | ICD-10-CM

## 2023-09-28 DIAGNOSIS — I10 PRIMARY HYPERTENSION: Chronic | ICD-10-CM

## 2023-09-28 PROCEDURE — 1160F RVW MEDS BY RX/DR IN RCRD: CPT | Performed by: INTERNAL MEDICINE

## 2023-09-28 PROCEDURE — 1159F MED LIST DOCD IN RCRD: CPT | Performed by: INTERNAL MEDICINE

## 2023-09-28 PROCEDURE — 93000 ELECTROCARDIOGRAM COMPLETE: CPT | Performed by: INTERNAL MEDICINE

## 2023-09-28 PROCEDURE — 99214 OFFICE O/P EST MOD 30 MIN: CPT | Performed by: INTERNAL MEDICINE

## 2023-09-28 NOTE — PROGRESS NOTES
Subjective:     Encounter Date:09/28/2023      Patient ID: Sarkis Naranjo is a 87 y.o. male with coronary artery disease, status post remote history of PCI to the diagonal branch, also with hypertension, hyperlipidemia, type 2 diabetes mellitus who presents today for routine follow-up.     Chief Complaint: Here for follow-up of CAD    History of Present Illness    This patient presents today for follow-up of coronary artery disease.  He has remote history of PCI.  He remains chest pain-free.  He describes stable breathing with no significant shortness of breath, dyspnea on exertion.  He denies having significant lower extremity edema.  He does have peripheral neuropathy and notes that he does have some issues with this problem.  He denies having orthopnea, PND or significant edema.  He also denies having palpitations, lightheadedness, dizziness, syncope.  He denies having any side effects from his current medications.      Current Outpatient Medications:     amLODIPine (NORVASC) 2.5 MG tablet, Take 1 tablet by mouth Daily., Disp: 90 tablet, Rfl: 3    aspirin 81 MG EC tablet, Take 1 tablet by mouth Daily., Disp: , Rfl:     atenolol (TENORMIN) 50 MG tablet, TAKE ONE TABLET BY MOUTH DAILY, Disp: 90 tablet, Rfl: 3    atorvastatin (LIPITOR) 20 MG tablet, Take 1 tablet by mouth Daily., Disp: 90 tablet, Rfl: 3    Calcium Carb-Cholecalciferol 250-125 MG-UNIT tablet, Take 1 tablet by mouth Daily., Disp: , Rfl:     Cinnamon 500 MG capsule, Take 2 capsules by mouth Daily., Disp: , Rfl:     dutasteride (AVODART) 0.5 MG capsule, Take 1 capsule by mouth Daily., Disp: , Rfl:     gabapentin (NEURONTIN) 300 MG capsule, TAKE ONE CAPSULE THREE TIMES DAILY, Disp: 90 capsule, Rfl: 0    glipizide (GLUCOTROL) 10 MG tablet, Take 1 tablet by mouth 2 (Two) Times a Day Before Meals., Disp: 90 tablet, Rfl: 3    glucose blood test strip, 1 each by Other route 2 (Two) Times a Day. Use as instructed, Disp: 100 each, Rfl: 5    Insulin Glargine  "(LANTUS SOLOSTAR) 100 UNIT/ML injection pen, Inject 20 Units under the skin into the appropriate area as directed Daily., Disp: 15 mL, Rfl: 3    Insulin Pen Needle (PEN NEEDLES 3/16\") 31G X 5 MM misc, 1 each Daily., Disp: 90 each, Rfl: 3    levothyroxine (SYNTHROID, LEVOTHROID) 50 MCG tablet, TAKE ONE TABLET BY MOUTH DAILY, Disp: 90 tablet, Rfl: 3    meloxicam (MOBIC) 15 MG tablet, TAKE 1 TABLET DAILY., Disp: 30 tablet, Rfl: 5    metFORMIN (GLUMETZA) 1000 MG (MOD) 24 hr tablet, TAKE ONE TABLET TWICE DAILY WITH MEALS, Disp: 180 tablet, Rfl: 3    Multiple Vitamins-Minerals (MULTIVITAMIN WITH MINERALS) tablet tablet, Take 1 tablet by mouth Daily., Disp: , Rfl:     Omega-3 Fatty Acids (FISH OIL) 1000 MG capsule capsule, Take 1 capsule by mouth Daily With Breakfast., Disp: , Rfl:     tamsulosin (FLOMAX) 0.4 MG capsule 24 hr capsule, Take 1 capsule by mouth 2 (Two) Times a Day., Disp: , Rfl:     Trulicity 1.5 MG/0.5ML solution pen-injector, Inject 1.5 mg under the skin into the appropriate area as directed 1 (One) Time Per Week., Disp: 2 mL, Rfl: 3    No Known Allergies    Social History     Tobacco Use    Smoking status: Never    Smokeless tobacco: Never   Substance Use Topics    Alcohol use: Yes     Comment: occ     Review of Systems   Constitutional: Negative for fever and weight loss.   Cardiovascular:  Negative for chest pain, dyspnea on exertion, leg swelling, orthopnea, palpitations, paroxysmal nocturnal dyspnea and syncope.   Respiratory:  Negative for cough, shortness of breath and wheezing.    Gastrointestinal:  Negative for abdominal pain, nausea and vomiting.   Neurological:  Positive for numbness and paresthesias. Negative for dizziness, headaches and loss of balance.       ECG 12 Lead    Date/Time: 9/28/2023 11:58 AM  Performed by: Ezekiel Heath MD  Authorized by: Ezekiel Heath MD   Comparison: compared with previous ECG from 9/28/2022  Similar to previous ECG  Rhythm: sinus rhythm  Rate: " "normal  BPM: 64  Conduction: 1st degree AV block  ST Segments: ST segments normal  T Waves: T waves normal  QRS axis: normal    Clinical impression: non-specific ECG           Objective:     Vitals reviewed.   Constitutional:       General: Not in acute distress.     Appearance: Well-developed and not in distress.   Eyes:      Extraocular Movements: Extraocular movements intact.   HENT:      Head: Normocephalic and atraumatic.   Pulmonary:      Effort: Pulmonary effort is normal.      Breath sounds: Normal breath sounds. No wheezing. No rhonchi. No rales.   Cardiovascular:      Normal rate. Regular rhythm.      Murmurs: There is no murmur.      No gallop.    Pulses:     Intact distal pulses.   Edema:     Peripheral edema absent.   Abdominal:      General: Bowel sounds are normal. There is no distension.      Palpations: Abdomen is soft.      Tenderness: There is no abdominal tenderness.   Skin:     General: Skin is warm and dry. There is no cyanosis.      Findings: No erythema or rash.   Neurological:      Mental Status: Alert and oriented to person, place, and time.      Cranial Nerves: No cranial nerve deficit.     /68   Pulse 64   Ht 188 cm (74\")   Wt 105 kg (231 lb)   SpO2 98%   BMI 29.66 kg/m²     Data/Lab Review:     Lab Results   Component Value Date    CHLPL 122 (L) 01/18/2023    TRIG 105 01/18/2023    HDL 47 (L) 01/18/2023    LDL 54 01/18/2023         Assessment:          Diagnosis Plan   1. Coronary artery disease involving native coronary artery of native heart without angina pectoris  ECG 12 Lead      2. Primary hypertension        3. Mixed hyperlipidemia               Plan:       1.  Coronary artery disease: Stable at this time with no anginal symptoms described.  The patient does remain on aspirin, beta-blocker and statin therapies.  No changes at this time.    2.  Primary hypertension: Blood pressure is well controlled.  Continue to monitor.  Continue atenolol.    3.  Mixed hyperlipidemia: " LDL cholesterol at goal at last check, referenced above.  The patient does continue statin therapy.    We will see the patient again in 1 year unless otherwise needed sooner.

## 2023-10-03 DIAGNOSIS — E11.40 TYPE 2 DIABETES MELLITUS WITH DIABETIC NEUROPATHY, WITHOUT LONG-TERM CURRENT USE OF INSULIN: ICD-10-CM

## 2023-10-03 RX ORDER — DULAGLUTIDE 1.5 MG/.5ML
1.5 INJECTION, SOLUTION SUBCUTANEOUS WEEKLY
Qty: 2 ML | Refills: 2 | Status: SHIPPED | OUTPATIENT
Start: 2023-10-03

## 2023-10-04 LAB
ALBUMIN SERPL-MCNC: 4.2 G/DL (ref 3.5–5.2)
ALP SERPL-CCNC: 76 U/L (ref 40–130)
ALT SERPL-CCNC: 21 U/L (ref 5–41)
ANION GAP SERPL CALCULATED.3IONS-SCNC: 14 MMOL/L (ref 7–19)
AST SERPL-CCNC: 23 U/L (ref 5–40)
BASOPHILS # BLD: 0.1 K/UL (ref 0–0.2)
BASOPHILS NFR BLD: 1.5 % (ref 0–1)
BILIRUB SERPL-MCNC: 0.7 MG/DL (ref 0.2–1.2)
BUN SERPL-MCNC: 21 MG/DL (ref 8–23)
CALCIUM SERPL-MCNC: 9.5 MG/DL (ref 8.8–10.2)
CHLORIDE SERPL-SCNC: 102 MMOL/L (ref 98–111)
CHOLEST SERPL-MCNC: 126 MG/DL (ref 160–199)
CO2 SERPL-SCNC: 25 MMOL/L (ref 22–29)
CREAT SERPL-MCNC: 1 MG/DL (ref 0.5–1.2)
EOSINOPHIL # BLD: 0.3 K/UL (ref 0–0.6)
EOSINOPHIL NFR BLD: 3.8 % (ref 0–5)
ERYTHROCYTE [DISTWIDTH] IN BLOOD BY AUTOMATED COUNT: 13.5 % (ref 11.5–14.5)
GLUCOSE SERPL-MCNC: 136 MG/DL (ref 74–109)
HBA1C MFR BLD: 7.9 % (ref 4–6)
HCT VFR BLD AUTO: 46.6 % (ref 42–52)
HDLC SERPL-MCNC: 41 MG/DL (ref 55–121)
HGB BLD-MCNC: 15.3 G/DL (ref 14–18)
IMM GRANULOCYTES # BLD: 0 K/UL
LDLC SERPL CALC-MCNC: 57 MG/DL
LYMPHOCYTES # BLD: 2.2 K/UL (ref 1.1–4.5)
LYMPHOCYTES NFR BLD: 29.5 % (ref 20–40)
MCH RBC QN AUTO: 30.7 PG (ref 27–31)
MCHC RBC AUTO-ENTMCNC: 32.8 G/DL (ref 33–37)
MCV RBC AUTO: 93.6 FL (ref 80–94)
MONOCYTES # BLD: 0.9 K/UL (ref 0–0.9)
MONOCYTES NFR BLD: 11.6 % (ref 0–10)
NEUTROPHILS # BLD: 3.9 K/UL (ref 1.5–7.5)
NEUTS SEG NFR BLD: 53.2 % (ref 50–65)
PLATELET # BLD AUTO: 287 K/UL (ref 130–400)
PMV BLD AUTO: 9.7 FL (ref 9.4–12.4)
POTASSIUM SERPL-SCNC: 4.2 MMOL/L (ref 3.5–5)
PROT SERPL-MCNC: 7.3 G/DL (ref 6.6–8.7)
RBC # BLD AUTO: 4.98 M/UL (ref 4.7–6.1)
SODIUM SERPL-SCNC: 141 MMOL/L (ref 136–145)
T4 FREE SERPL-MCNC: 1.13 NG/DL (ref 0.93–1.7)
TRIGL SERPL-MCNC: 139 MG/DL (ref 0–149)
TSH SERPL DL<=0.005 MIU/L-ACNC: 6.63 UIU/ML (ref 0.35–5.5)
WBC # BLD AUTO: 7.3 K/UL (ref 4.8–10.8)

## 2023-10-11 ENCOUNTER — OFFICE VISIT (OUTPATIENT)
Dept: INTERNAL MEDICINE | Facility: CLINIC | Age: 88
End: 2023-10-11
Payer: MEDICARE

## 2023-10-11 VITALS
WEIGHT: 233 LBS | OXYGEN SATURATION: 97 % | HEIGHT: 74 IN | BODY MASS INDEX: 29.9 KG/M2 | DIASTOLIC BLOOD PRESSURE: 64 MMHG | HEART RATE: 59 BPM | TEMPERATURE: 97.3 F | SYSTOLIC BLOOD PRESSURE: 118 MMHG

## 2023-10-11 DIAGNOSIS — C68.9 UROTHELIAL CARCINOMA: ICD-10-CM

## 2023-10-11 DIAGNOSIS — L60.0 INGROWN TOENAIL OF RIGHT FOOT WITH INFECTION: ICD-10-CM

## 2023-10-11 DIAGNOSIS — E78.2 MIXED HYPERLIPIDEMIA: Chronic | ICD-10-CM

## 2023-10-11 DIAGNOSIS — I25.10 CORONARY ARTERY DISEASE INVOLVING NATIVE CORONARY ARTERY OF NATIVE HEART WITHOUT ANGINA PECTORIS: ICD-10-CM

## 2023-10-11 DIAGNOSIS — N40.1 BENIGN PROSTATIC HYPERPLASIA WITH URINARY OBSTRUCTION: ICD-10-CM

## 2023-10-11 DIAGNOSIS — E03.9 ACQUIRED HYPOTHYROIDISM: Chronic | ICD-10-CM

## 2023-10-11 DIAGNOSIS — I10 PRIMARY HYPERTENSION: Chronic | ICD-10-CM

## 2023-10-11 DIAGNOSIS — E11.40 TYPE 2 DIABETES MELLITUS WITH DIABETIC NEUROPATHY, WITHOUT LONG-TERM CURRENT USE OF INSULIN: Chronic | ICD-10-CM

## 2023-10-11 DIAGNOSIS — Z23 NEED FOR PNEUMOCOCCAL 20-VALENT CONJUGATE VACCINATION: ICD-10-CM

## 2023-10-11 DIAGNOSIS — G62.9 NEUROPATHY: ICD-10-CM

## 2023-10-11 DIAGNOSIS — N13.8 BENIGN PROSTATIC HYPERPLASIA WITH URINARY OBSTRUCTION: ICD-10-CM

## 2023-10-11 DIAGNOSIS — Z23 NEED FOR INFLUENZA VACCINATION: Primary | ICD-10-CM

## 2023-10-11 RX ORDER — PREGABALIN 50 MG/1
50 CAPSULE ORAL 2 TIMES DAILY
Qty: 60 CAPSULE | Refills: 2 | Status: SHIPPED | OUTPATIENT
Start: 2023-10-11

## 2023-10-11 NOTE — PROGRESS NOTES
The ABCs of the Annual Wellness Visit  Subsequent Medicare Wellness Visit    Chief Complaint   Patient presents with    Medicare Wellness-subsequent     Labs printed     Neuropathy      Patient complains of neuropathy of bilateral feet x years.  States the R foot is worse than the L.   Tried OTC remedies but would like to see if there are other alternatives to trial.       Subjective    History of Present Illness:  Sarkis Naranjo is a 87 y.o. male who presents for a Subsequent Medicare Wellness Visit.    The following portions of the patient's history were reviewed and   updated as appropriate: allergies, current medications, past family history, past medical history, past social history, past surgical history and problem list.    Compared to one year ago, the patient feels his physical   health is worse.    Compared to one year ago, the patient feels his mental   health is the same.    Recent Hospitalizations:  He was not admitted to the hospital during the last year.       Current Medical Providers:  Patient Care Team:  Vasu Brown MD as PCP - General (Internal Medicine)  Ezekiel Heath MD as Cardiologist (Cardiology)  Meño Harris MD as Referring Physician (Internal Medicine)    Outpatient Medications Prior to Visit   Medication Sig Dispense Refill    amLODIPine (NORVASC) 2.5 MG tablet Take 1 tablet by mouth Daily. 90 tablet 3    aspirin 81 MG EC tablet Take 1 tablet by mouth Daily.      atenolol (TENORMIN) 50 MG tablet TAKE ONE TABLET BY MOUTH DAILY 90 tablet 3    atorvastatin (LIPITOR) 20 MG tablet Take 1 tablet by mouth Daily. 90 tablet 3    Calcium Carb-Cholecalciferol 250-125 MG-UNIT tablet Take 1 tablet by mouth Daily.      Cinnamon 500 MG capsule Take 2 capsules by mouth Daily.      Dulaglutide (Trulicity) 1.5 MG/0.5ML solution pen-injector Inject 1.5 mg under the skin into the appropriate area as directed 1 (One) Time Per Week. 2 mL 2    dutasteride (AVODART) 0.5 MG  "capsule Take 1 capsule by mouth Daily.      glipizide (GLUCOTROL) 10 MG tablet Take 1 tablet by mouth 2 (Two) Times a Day Before Meals. 90 tablet 3    glucose blood test strip 1 each by Other route 2 (Two) Times a Day. Use as instructed 100 each 5    Insulin Glargine (LANTUS SOLOSTAR) 100 UNIT/ML injection pen Inject 20 Units under the skin into the appropriate area as directed Daily. 15 mL 3    Insulin Pen Needle (PEN NEEDLES 3/16\") 31G X 5 MM misc 1 each Daily. 90 each 3    levothyroxine (SYNTHROID, LEVOTHROID) 50 MCG tablet TAKE ONE TABLET BY MOUTH DAILY 90 tablet 3    meloxicam (MOBIC) 15 MG tablet TAKE 1 TABLET DAILY. 30 tablet 5    metFORMIN (GLUMETZA) 1000 MG (MOD) 24 hr tablet TAKE ONE TABLET TWICE DAILY WITH MEALS 180 tablet 3    Multiple Vitamins-Minerals (MULTIVITAMIN WITH MINERALS) tablet tablet Take 1 tablet by mouth Daily.      Omega-3 Fatty Acids (FISH OIL) 1000 MG capsule capsule Take 1 capsule by mouth Daily With Breakfast.      tamsulosin (FLOMAX) 0.4 MG capsule 24 hr capsule Take 1 capsule by mouth 2 (Two) Times a Day.      gabapentin (NEURONTIN) 300 MG capsule TAKE ONE CAPSULE THREE TIMES DAILY 90 capsule 0     No facility-administered medications prior to visit.       No opioid medication identified on active medication list. I have reviewed chart for other potential  high risk medication/s and harmful drug interactions in the elderly.        Aspirin is on active medication list. Aspirin use is indicated based on review of current medical condition/s. Pros and cons of this therapy have been discussed today. Benefits of this medication outweigh potential harm.  Patient has been encouraged to continue taking this medication.  .      Patient Active Problem List   Diagnosis    Coronary artery disease involving native coronary artery of native heart without angina pectoris    Hypertension    Hyperlipidemia    Acquired hypothyroidism    Type 2 diabetes mellitus with diabetic neuropathy, without " "long-term current use of insulin (HCC)    Enlarged prostate    H/O heart artery stent    Neuropathy    Peripheral edema    Urothelial carcinoma    Screening for intestinal cancer    Ulcer of left lower extremity    Bladder cancer    Benign prostatic hyperplasia with urinary obstruction    Raised prostate specific antigen    Retention of urine    Osteoarthritis of multiple joints    Eczema    Ingrown toenail of right foot with infection     Advance Care Planning  Advance Directive is not on file.  ACP discussion was held with the patient during this visit. Patient has an advance directive (not in EMR), copy requested.       Objective    Vitals:    10/11/23 1130   BP: 118/64   BP Location: Left arm   Patient Position: Sitting   Cuff Size: Adult   Pulse: 59   Temp: 97.3 øF (36.3 øC)   TempSrc: Temporal   SpO2: 97%   Weight: 106 kg (233 lb)   Height: 188 cm (74\")   PainSc:   2   PainLoc: Knee  Comment: bilateral     Estimated body mass index is 29.92 kg/mý as calculated from the following:    Height as of this encounter: 188 cm (74\").    Weight as of this encounter: 106 kg (233 lb).    BMI is >= 25 and <30. (Overweight) The following options were offered after discussion;: exercise counseling/recommendations and nutrition counseling/recommendations      Does the patient have evidence of cognitive impairment? No      Lab Results   Component Value Date    CHLPL 126 (L) 10/04/2023    TRIG 139 10/04/2023    HDL 41 (L) 10/04/2023    LDL 57 10/04/2023    HGBA1C 7.9 (H) 10/04/2023            HEALTH RISK ASSESSMENT    Smoking Status:  Social History     Tobacco Use   Smoking Status Never   Smokeless Tobacco Never     Alcohol Consumption:  Social History     Substance and Sexual Activity   Alcohol Use Yes    Comment: occ     Fall Risk Screen:    STEADI Fall Risk Assessment was completed, and patient is at MODERATE risk for falls. Assessment completed on:10/11/2023    Depression Screening:      10/11/2023    11:33 AM "   PHQ-2/PHQ-9 Depression Screening   Little Interest or Pleasure in Doing Things 0-->not at all   Feeling Down, Depressed or Hopeless 0-->not at all   PHQ-9: Brief Depression Severity Measure Score 0       Health Habits and Functional and Cognitive Screening:      10/11/2023    11:35 AM   Functional & Cognitive Status   Do you have difficulty preparing food and eating? No   Do you have difficulty bathing yourself, getting dressed or grooming yourself? No   Do you have difficulty using the toilet? No   Do you have difficulty moving around from place to place? No   Do you have trouble with steps or getting out of a bed or a chair? Yes   Current Diet Well Balanced Diet   Dental Exam Up to date   Eye Exam Up to date   Exercise (times per week) 2 times per week   Current Exercises Include Walking   Do you need help using the phone?  No   Are you deaf or do you have serious difficulty hearing?  No   Do you need help to go to places out of walking distance? Yes   Do you need help shopping? No   Do you need help preparing meals?  No   Do you need help with housework?  No   Do you need help with laundry? No   Do you need help taking your medications? No   Do you need help managing money? No   Do you ever drive or ride in a car without wearing a seat belt? No   Have you felt unusual stress, anger or loneliness in the last month? No   Who do you live with? Spouse   If you need help, do you have trouble finding someone available to you? No   Have you been bothered in the last four weeks by sexual problems? No   Do you have difficulty concentrating, remembering or making decisions? Yes       Age-appropriate Screening Schedule:  Refer to the list below for future screening recommendations based on patient's age, sex and/or medical conditions. Orders for these recommended tests are listed in the plan section. The patient has been provided with a written plan.    Health Maintenance   Topic Date Due    URINE MICROALBUMIN  Never done     TDAP/TD VACCINES (1 - Tdap) Never done    ZOSTER VACCINE (1 of 2) Never done    DIABETIC EYE EXAM  10/15/2022    COVID-19 Vaccine (4 - 2023-24 season) 09/01/2023    HEMOGLOBIN A1C  04/04/2024    LIPID PANEL  10/04/2024    ANNUAL WELLNESS VISIT  10/11/2024    BMI FOLLOWUP  10/11/2024    INFLUENZA VACCINE  Completed    Pneumococcal Vaccine 65+  Completed              Assessment & Plan   CMS Preventative Services Quick Reference  Risk Factors Identified During Encounter  Immunizations Discussed/Encouraged: Influenza, Prevnar 20 (Pneumococcal 20-valent conjugate), Shingrix, and COVID19  Dental Screening Recommended  Vision Screening Recommended  The above risks/problems have been discussed with the patient.  Follow up actions/plans if indicated are seen below in the Assessment/Plan Section.  Pertinent information has been shared with the patient in the After Visit Summary.    Diagnoses and all orders for this visit:    1. Need for influenza vaccination (Primary)  -     Fluzone (or Fluarix & Flulaval for VFC) >6mos    2. Neuropathy  -     pregabalin (Lyrica) 50 MG capsule; Take 1 capsule by mouth 2 (Two) Times a Day.  Dispense: 60 capsule; Refill: 2    3. Need for pneumococcal 20-valent conjugate vaccination  -     Pneumococcal Conjugate Vaccine 20-Valent All    4. Coronary artery disease involving native coronary artery of native heart without angina pectoris    5. Type 2 diabetes mellitus with diabetic neuropathy, without long-term current use of insulin (HCC)    6. Primary hypertension    7. Mixed hyperlipidemia    8. Acquired hypothyroidism    9. Benign prostatic hyperplasia with urinary obstruction    10. Urothelial carcinoma    11. Ingrown toenail of right foot with infection  -     Validation General Procedure        PCV and influenza today  Increase activity as tolerated  Would like eye records if able  Recommend living will.      Result Review :           Follow Up:   Return in about 4 months (around  "2/11/2024), or if symptoms worsen or fail to improve, for Recheck, Next scheduled follow up.     An After Visit Summary and PPPS were made available to the patient.                         NIK Brown MD, FACP, [facility]      Electronically signed by Vasu Brown MD, 10/11/23, 11:57 AM CDT.    Additional E&M Note during same encounter follows:  Patient has multiple medical problems which are significant and separately identifiable that require additional work above and beyond the Medicare Wellness Visit.      Chief Complaint  Medicare Wellness-subsequent (Labs printed ) and Neuropathy  (Patient complains of neuropathy of bilateral feet x years./States the R foot is worse than the L. /Tried OTC remedies but would like to see if there are other alternatives to trial. )    Subjective        HPI  Sarkis Naranjo is also being seen today for above problems      Objective   Vitals:    10/11/23 1130   BP: 118/64   BP Location: Left arm   Patient Position: Sitting   Cuff Size: Adult   Pulse: 59   Temp: 97.3 øF (36.3 øC)   TempSrc: Temporal   SpO2: 97%   Weight: 106 kg (233 lb)   Height: 188 cm (74\")   PainSc:   2   PainLoc: Knee  Comment: bilateral     Physical Exam  Vitals reviewed.   Constitutional:       Appearance: He is not ill-appearing.   Cardiovascular:      Rate and Rhythm: Normal rate and regular rhythm.   Pulmonary:      Effort: Pulmonary effort is normal. No respiratory distress.   Musculoskeletal:      Right lower leg: Edema present.      Left lower leg: Edema present.        Feet:    Feet:      Comments: Dystrophic nails  Infected area on right lateral foot with overgrowth of tissue on nail  Neuropathy bilateral feet diffuse  Skin:     Comments: Venous stasis changes bilaterally   Neurological:      General: No focal deficit present.      Mental Status: He is alert and oriented to person, place, and time.   Psychiatric:         Mood and Affect: Mood normal.         Behavior: Behavior normal.        "       The following data was reviewed by: Vasu Brown MD on 10/11/2023:    CBC          10/4/2023    06:49   CBC   WBC 7.3       RBC 4.98       Hemoglobin 15.3       Hematocrit 46.6       MCV 93.6       MCH 30.7       MCHC 32.8       RDW 13.5       Platelets 287          Details          This result is from an external source.             CBC w/diff          10/4/2023    06:49   CBC w/Diff   WBC 7.3       RBC 4.98       Hemoglobin 15.3       Hematocrit 46.6       MCV 93.6       MCH 30.7       MCHC 32.8       RDW 13.5       Platelets 287       Neutrophil Rel % 53.2       Lymphocyte Rel % 29.5       Monocyte Rel % 11.6       Eosinophil Rel % 3.8       Basophil Rel % 1.5          Details          This result is from an external source.             Lipid Panel          1/18/2023    07:09 10/4/2023    06:49   Lipid Panel   Total Cholesterol 122     126       Triglycerides 105     139       HDL Cholesterol 47     41       LDL Cholesterol  54     57          Details          This result is from an external source.                 A1C Last 3 Results          1/18/2023    07:09 4/27/2023    11:07 10/4/2023    06:49   HGBA1C Last 3 Results   Hemoglobin A1C 7.2     7.1  7.9          Details          This result is from an external source.                         Validation General Procedure    Date/Time: 10/11/2023 1:10 PM    Performed by: Vasu Brown MD  Authorized by: Vasu Brwon MD  Local anesthesia used: no    Anesthesia:  Local anesthesia used: no    Sedation:  Patient sedated: no    Patient tolerance: patient tolerated the procedure well with no immediate complications  Comments: Using ~4 mm currette cut dead tissue off lateral aspect of right great toe and under the nail bed.  Probed for ingrown area but none obvious was found.  Removed some of overhanging tissue.  Applied peroxide.  Applied triple antibiotic ointment.                Assessment and Plan   Diagnoses and all orders for this  visit:    1. Need for influenza vaccination (Primary)  -     Fluzone (or Fluarix & Flulaval for VFC) >6mos    2. Neuropathy  -     pregabalin (Lyrica) 50 MG capsule; Take 1 capsule by mouth 2 (Two) Times a Day.  Dispense: 60 capsule; Refill: 2    3. Need for pneumococcal 20-valent conjugate vaccination  -     Pneumococcal Conjugate Vaccine 20-Valent All    4. Coronary artery disease involving native coronary artery of native heart without angina pectoris    5. Type 2 diabetes mellitus with diabetic neuropathy, without long-term current use of insulin (HCC)    6. Primary hypertension    7. Mixed hyperlipidemia    8. Acquired hypothyroidism    9. Benign prostatic hyperplasia with urinary obstruction    10. Urothelial carcinoma    11. Ingrown toenail of right foot with infection  -     Validation General Procedure      Patient had recent cystoscopy with urologist in [place] got a clean report.  Recent cardiology visit was clean.    Patients neuropathy worsening.  More discomfort.  Does not think gabapentin is helping.  Will switch to lyrica 50 mg BID.  Continue the neurvive as I think the menthol and lidocaine gel does help.  Will trial the new medication.    For the patients DM.  He has been eating worse.  They would rather increase the lantus as opposed to increase the trulicity.  Will titrate to 25 units and if tolerates it will titrate to 30 units.  Recommend monitoring sugar.  Recommend working on dietary changes    Lipids at goal of < 70 in a patient with CAD.    Patient up to date with eye examinations, goes every 6 months.     Patient has ingrown toenail and overgrowth on the nail bed.  Bedrided dead tissue and cleaned out debris under nail.  Do not see obvious ingrown area at the present time.  Recommended follow-up with his podiatrist if does not improved soon.  This nail appears to have been cut back at the corner quite a bit.  I did not trim the nails, just debrided dead tissue, debrided under the nail and  cleared out the purulence and applied peroxide and triple antibiotic ointment.  Looks a lot better after debridement of dead tissue.      TSH mildly elevated.  Given age, think risk of increasing levothyroxine outweighs benefit.  Would make adjustment if TSH > 8.           Follow Up   Return in about 4 months (around 2/11/2024), or if symptoms worsen or fail to improve, for Recheck, Next scheduled follow up.  Patient was given instructions and counseling regarding his condition or for health maintenance advice. Please see specific information pulled into the AVS if appropriate.

## 2023-10-24 RX ORDER — ATORVASTATIN CALCIUM 20 MG/1
20 TABLET, FILM COATED ORAL DAILY
Qty: 90 TABLET | Refills: 3 | Status: SHIPPED | OUTPATIENT
Start: 2023-10-24

## 2023-10-27 ENCOUNTER — TELEPHONE (OUTPATIENT)
Dept: CARDIOLOGY | Facility: CLINIC | Age: 88
End: 2023-10-27
Payer: MEDICARE

## 2023-10-27 NOTE — TELEPHONE ENCOUNTER
Patient is needing cardiac risk assessment for right total knee repair. Takes aspirin daily. Please advise.

## 2023-11-30 RX ORDER — AMLODIPINE BESYLATE 2.5 MG/1
2.5 TABLET ORAL DAILY
Qty: 90 TABLET | Refills: 3 | Status: SHIPPED | OUTPATIENT
Start: 2023-11-30

## 2023-12-26 DIAGNOSIS — E11.40 TYPE 2 DIABETES MELLITUS WITH DIABETIC NEUROPATHY, WITHOUT LONG-TERM CURRENT USE OF INSULIN: ICD-10-CM

## 2023-12-26 RX ORDER — DULAGLUTIDE 1.5 MG/.5ML
1.5 INJECTION, SOLUTION SUBCUTANEOUS WEEKLY
Qty: 2 ML | Refills: 2 | OUTPATIENT
Start: 2023-12-26

## 2023-12-26 NOTE — TELEPHONE ENCOUNTER
Caller: erwin cornelius    Relationship: Emergency Contact    Best call back number: [phone]     Requested Prescriptions:   Requested Prescriptions     Pending Prescriptions Disp Refills    Dulaglutide (Trulicity) 1.5 MG/0.5ML solution pen-injector 2 mL 2     Sig: Inject 1.5 mg under the skin into the appropriate area as directed 1 (One) Time Per Week.        Pharmacy where request should be sent: [facility] [phone] [facility] [phone]      Last office visit with prescribing clinician: 10/11/2023   Last telemedicine visit with prescribing clinician: Visit date not found   Next office visit with prescribing clinician: 2/21/2024     Additional details provided by patient: NEEDS A NEW PRESCRIPTION SENT OVER WITH DR. REILLY'S NAME ON IT INSTEAD OF DR. GARRISON'S NAME. NEEDS BEFORE SATURDAY.    Does the patient have less than a 3 day supply:  [x] Yes  [] No    Would you like a call back once the refill request has been completed: [] Yes [x] No    If the office needs to give you a call back, can they leave a voicemail: [x] Yes [] No    Doyle Mitchell Rep   12/26/23 09:27 CST

## 2023-12-27 RX ORDER — DULAGLUTIDE 1.5 MG/.5ML
1.5 INJECTION, SOLUTION SUBCUTANEOUS WEEKLY
Qty: 2 ML | Refills: 2 | Status: SHIPPED | OUTPATIENT
Start: 2023-12-27

## 2024-01-20 DIAGNOSIS — G62.9 NEUROPATHY: ICD-10-CM

## 2024-01-22 RX ORDER — PREGABALIN 50 MG/1
50 CAPSULE ORAL 2 TIMES DAILY
Qty: 60 CAPSULE | Refills: 0 | Status: SHIPPED | OUTPATIENT
Start: 2024-01-22

## 2024-01-23 DIAGNOSIS — G62.9 NEUROPATHY: ICD-10-CM

## 2024-01-23 RX ORDER — PREGABALIN 50 MG/1
50 CAPSULE ORAL 2 TIMES DAILY
Qty: 60 CAPSULE | Refills: 0 | OUTPATIENT
Start: 2024-01-23

## 2024-02-05 DIAGNOSIS — E11.40 TYPE 2 DIABETES MELLITUS WITH DIABETIC NEUROPATHY, WITHOUT LONG-TERM CURRENT USE OF INSULIN: ICD-10-CM

## 2024-02-05 NOTE — TELEPHONE ENCOUNTER
Caller: erwin cornelius    Relationship: Emergency Contact    Best call back number:     [phone] (Home)       Who are you requesting to speak with (clinical staff, provider,  specific staff member):  CLINICAL    What was the call regarding: THE PATIENT'S  EMERGENCY CONTACT STATES  THE PATIENT MISSED THE TRULICITY 1.5 MG DOSE ON 1/31/24 BECAUSE THE TRULICITY 1.5 MG IS BACKORDERED. THE PATIENT'S EMERGENCY CONTACT STATES SHE CANNOT FIND THE TRULICITY 1.5 MG ANYWHERE AND STATES THAT SHE WOULD LIKE TO KNOW HOW TO PROCEED.  PLEASE ADVISE.

## 2024-02-06 NOTE — TELEPHONE ENCOUNTER
Notified patient's spouse of information below, she voiced understanding - would like to proceed with the increased dosage.

## 2024-02-07 ENCOUNTER — HOSPITAL ENCOUNTER (OUTPATIENT)
Dept: PREADMISSION TESTING | Age: 89
Discharge: HOME | End: 2024-02-11
Payer: MEDICARE

## 2024-02-07 VITALS — HEIGHT: 74 IN | BODY MASS INDEX: 30.06 KG/M2 | WEIGHT: 234.2 LBS

## 2024-02-07 DIAGNOSIS — E11.40 TYPE 2 DIABETES MELLITUS WITH DIABETIC NEUROPATHY, WITHOUT LONG-TERM CURRENT USE OF INSULIN: ICD-10-CM

## 2024-02-07 LAB
ALBUMIN SERPL-MCNC: 4.3 G/DL (ref 3.5–5.2)
ALP SERPL-CCNC: 70 U/L (ref 40–130)
ALT SERPL-CCNC: 23 U/L (ref 5–41)
ANION GAP SERPL CALCULATED.3IONS-SCNC: 10 MMOL/L (ref 7–19)
APTT PPP: 25.9 SEC (ref 26–36.2)
AST SERPL-CCNC: 20 U/L (ref 5–40)
BACTERIA #/AREA URNS HPF: ABNORMAL /HPF
BILIRUB SERPL-MCNC: 0.5 MG/DL (ref 0.2–1.2)
BILIRUB UR QL STRIP: NEGATIVE
BUN SERPL-MCNC: 20 MG/DL (ref 8–23)
CALCIUM SERPL-MCNC: 9.8 MG/DL (ref 8.8–10.2)
CHLORIDE SERPL-SCNC: 104 MMOL/L (ref 98–111)
CLARITY UR: ABNORMAL
CO2 SERPL-SCNC: 25 MMOL/L (ref 22–29)
COLOR UR: YELLOW
CREAT SERPL-MCNC: 0.9 MG/DL (ref 0.5–1.2)
ERYTHROCYTE [DISTWIDTH] IN BLOOD BY AUTOMATED COUNT: 13.5 % (ref 11.5–14.5)
GLUCOSE SERPL-MCNC: 195 MG/DL (ref 74–109)
GLUCOSE UR STRIP.AUTO-MCNC: =>1000 MG/DL
HBA1C MFR BLD: 7.2 % (ref 4–6)
HCT VFR BLD AUTO: 44.8 % (ref 42–52)
HGB BLD-MCNC: 14.9 G/DL (ref 14–18)
HGB UR STRIP.AUTO-MCNC: ABNORMAL MG/L
HYALINE CASTS #/AREA URNS LPF: ABNORMAL /LPF (ref 0–5)
INR PPP: 1.03 (ref 0.88–1.18)
KETONES UR STRIP.AUTO-MCNC: ABNORMAL MG/DL
LEUKOCYTE ESTERASE UR QL STRIP.AUTO: ABNORMAL
MCH RBC QN AUTO: 29.8 PG (ref 27–31)
MCHC RBC AUTO-ENTMCNC: 33.3 G/DL (ref 33–37)
MCV RBC AUTO: 89.6 FL (ref 80–94)
MRSA DNA SPEC QL NAA+PROBE: NOT DETECTED
NITRITE UR QL STRIP.AUTO: POSITIVE
PH UR STRIP.AUTO: 5 [PH] (ref 5–8)
PLATELET # BLD AUTO: 239 K/UL (ref 130–400)
PMV BLD AUTO: 10.1 FL (ref 9.4–12.4)
POTASSIUM SERPL-SCNC: 4.6 MMOL/L (ref 3.5–5)
PROT SERPL-MCNC: 6.9 G/DL (ref 6.6–8.7)
PROT UR STRIP.AUTO-MCNC: 30 MG/DL
PROTHROMBIN TIME: 13.2 SEC (ref 12–14.6)
RBC # BLD AUTO: 5 M/UL (ref 4.7–6.1)
RBC #/AREA URNS HPF: ABNORMAL /HPF (ref 0–2)
SODIUM SERPL-SCNC: 139 MMOL/L (ref 136–145)
SP GR UR STRIP.AUTO: 1.03 (ref 1–1.03)
SQUAMOUS #/AREA URNS HPF: ABNORMAL /HPF
UROBILINOGEN UR STRIP.AUTO-MCNC: 0.2 E.U./DL
WBC # BLD AUTO: 6.8 K/UL (ref 4.8–10.8)
WBC #/AREA URNS HPF: ABNORMAL /HPF (ref 0–5)

## 2024-02-07 PROCEDURE — 81001 URINALYSIS AUTO W/SCOPE: CPT

## 2024-02-07 PROCEDURE — 87641 MR-STAPH DNA AMP PROBE: CPT

## 2024-02-07 PROCEDURE — 85610 PROTHROMBIN TIME: CPT

## 2024-02-07 PROCEDURE — 85027 COMPLETE CBC AUTOMATED: CPT

## 2024-02-07 PROCEDURE — 83036 HEMOGLOBIN GLYCOSYLATED A1C: CPT

## 2024-02-07 PROCEDURE — 80053 COMPREHEN METABOLIC PANEL: CPT

## 2024-02-07 PROCEDURE — 85730 THROMBOPLASTIN TIME PARTIAL: CPT

## 2024-02-07 RX ORDER — TAMSULOSIN HYDROCHLORIDE 0.4 MG/1
0.4 CAPSULE ORAL DAILY
COMMUNITY

## 2024-02-07 RX ORDER — CELECOXIB 200 MG/1
200 CAPSULE ORAL ONCE
OUTPATIENT
Start: 2024-02-07 | End: 2024-02-07

## 2024-02-07 RX ORDER — ATENOLOL 50 MG/1
50 TABLET ORAL DAILY
COMMUNITY

## 2024-02-07 RX ORDER — DULAGLUTIDE 1.5 MG/.5ML
1.5 INJECTION, SOLUTION SUBCUTANEOUS WEEKLY
Qty: 2 ML | Refills: 2 | Status: CANCELLED | OUTPATIENT
Start: 2024-02-07

## 2024-02-07 RX ORDER — DULAGLUTIDE 3 MG/.5ML
0.5 INJECTION, SOLUTION SUBCUTANEOUS WEEKLY
Qty: 2 ML | Refills: 5 | Status: SHIPPED | OUTPATIENT
Start: 2024-02-07

## 2024-02-07 RX ORDER — MELOXICAM 15 MG/1
15 TABLET ORAL DAILY
COMMUNITY

## 2024-02-07 RX ORDER — ASPIRIN 81 MG/1
81 TABLET, CHEWABLE ORAL DAILY
COMMUNITY

## 2024-02-07 RX ORDER — DEXAMETHASONE SODIUM PHOSPHATE 10 MG/ML
10 INJECTION, SOLUTION INTRAMUSCULAR; INTRAVENOUS ONCE
OUTPATIENT
Start: 2024-02-07 | End: 2024-02-07

## 2024-02-07 RX ORDER — OXYCODONE HCL 10 MG/1
10 TABLET, FILM COATED, EXTENDED RELEASE ORAL ONCE
OUTPATIENT
Start: 2024-02-07 | End: 2024-02-07

## 2024-02-07 RX ORDER — ACETAMINOPHEN 500 MG
1000 TABLET ORAL ONCE
OUTPATIENT
Start: 2024-02-07 | End: 2024-02-07

## 2024-02-07 RX ORDER — PREGABALIN 50 MG/1
50 CAPSULE ORAL 2 TIMES DAILY
COMMUNITY

## 2024-02-07 RX ORDER — GLIPIZIDE 10 MG/1
10 TABLET ORAL 2 TIMES DAILY PRN
COMMUNITY

## 2024-02-07 RX ORDER — DUTASTERIDE 0.5 MG/1
0.5 CAPSULE, LIQUID FILLED ORAL DAILY
COMMUNITY

## 2024-02-07 RX ORDER — OMEGA-3 FATTY ACIDS CAP DELAYED RELEASE 1000 MG 1000 MG
1000 CAPSULE DELAYED RELEASE ORAL DAILY
COMMUNITY

## 2024-02-07 RX ORDER — ATORVASTATIN CALCIUM 20 MG/1
20 TABLET, FILM COATED ORAL DAILY
COMMUNITY

## 2024-02-07 RX ORDER — PREGABALIN 75 MG/1
75 CAPSULE ORAL ONCE
OUTPATIENT
Start: 2024-02-07 | End: 2024-02-07

## 2024-02-07 RX ORDER — POLYETHYLENE GLYCOL 3350 17 G/17G
17 POWDER, FOR SOLUTION ORAL DAILY
COMMUNITY

## 2024-02-07 RX ORDER — SODIUM CHLORIDE, SODIUM LACTATE, POTASSIUM CHLORIDE, CALCIUM CHLORIDE 600; 310; 30; 20 MG/100ML; MG/100ML; MG/100ML; MG/100ML
INJECTION, SOLUTION INTRAVENOUS CONTINUOUS
OUTPATIENT
Start: 2024-02-07

## 2024-02-07 RX ORDER — LEVOTHYROXINE SODIUM 0.05 MG/1
50 TABLET ORAL DAILY
COMMUNITY

## 2024-02-07 RX ORDER — AMLODIPINE BESYLATE 2.5 MG/1
2.5 TABLET ORAL DAILY
COMMUNITY

## 2024-02-07 RX ORDER — INSULIN GLARGINE 100 [IU]/ML
22 INJECTION, SOLUTION SUBCUTANEOUS NIGHTLY
COMMUNITY

## 2024-02-07 RX ORDER — DULAGLUTIDE 1.5 MG/.5ML
1.5 INJECTION, SOLUTION SUBCUTANEOUS WEEKLY
COMMUNITY

## 2024-02-07 RX ORDER — AMPICILLIN TRIHYDRATE 250 MG
1000 CAPSULE ORAL DAILY
COMMUNITY

## 2024-02-07 RX ORDER — CALCIUM CARBONATE/VITAMIN D3 600MG-62.5
1 CAPSULE ORAL DAILY
COMMUNITY

## 2024-02-07 NOTE — TELEPHONE ENCOUNTER
Caller: erwin cornelius    Relationship: Emergency Contact    Best call back number: [phone]     Requested Prescriptions:   Requested Prescriptions     Pending Prescriptions Disp Refills    Dulaglutide (Trulicity) 1.5 MG/0.5ML solution pen-injector 2 mL 2     Sig: Inject 1.5 mg under the skin into the appropriate area as directed 1 (One) Time Per Week.        Pharmacy where request should be sent: [facility] [phone] [facility] [phone]      Last office visit with prescribing clinician: 10/11/2023   Last telemedicine visit with prescribing clinician: Visit date not found   Next office visit with prescribing clinician: 2/21/2024     Additional details provided by patient: PATIENTS WIFE STATES PATIENT HAS BEEN OUT A WEEK. THE NEW ORDER FOR 3.0    Does the patient have less than a 3 day supply:  [x] Yes  [] No    Would you like a call back once the refill request has been completed: [] Yes [x] No    If the office needs to give you a call back, can they leave a voicemail: [] Yes [x] No    Doyle Walters Rep   02/07/24 08:22 CST

## 2024-02-07 NOTE — DISCHARGE INSTRUCTIONS
[facility] will call on the 23rd with your arrival time.      If you wake up sick or are running late the morning of surgery call [phone].    BACTROBAN OINTMENT for the NARES  start using on the morning of the 19th and be sure to use the morning of surgery.    A script for Bactroban ointment has been call to your pharmacy or was given to you in written form by your surgeon.  The guidelines for the ointment use are as follows:    1)  Start using the ointment 7 days before your surgery date    2)  Use the ointment two times a day - morning and night    3)  Place the ointment on a Q-tip and swirl up in your nose making sure you cover completely       the skin just inside of each nostril.  Use one end of the Q-tip for each nostril.     Chlorhexidine Gluconate 4% Solution  use the night of the 24th, the night of the 25th and the morning of 26th.     Patient should shower with this soap a minimum of 3 consecutive showers (2 nights before surgery, the night before surgery and the morning of surgery) washing from the neck down (avoiding contact with genitalia).  DO NOT WASH YOUR HAIR OR FACE WITH THIS SOAP.  When washing with this soap, apply enough to suds up the body thoroughly, turn the water away from your body and allow the soap suds to remain on the body for 2 full minutes, then rinse body completely.  After using this soap on the body, please do not apply powders or lotions to your body.  After the shower the night before surgery, please dry off with a new towel, sleep in new freshly laundered pj's, and change your bed linen before going to sleep.     Stop trulicity on the 19th. Stop fish oil and aspirin per dr. Kumar orders.on the 25th only take 1/2 dose of your Lantus.  Morning of surgery take:    Take the amlodipine and levothyroxine the morning of surgery. Nothing else!!!     PREOPERATIVE GUIDELINES WHEN RECEIVING ANESTHESIA    Do not eat or drink anything after midnight, the night before your surgery. No gum or

## 2024-02-08 PROCEDURE — 87186 SC STD MICRODIL/AGAR DIL: CPT

## 2024-02-08 PROCEDURE — 87086 URINE CULTURE/COLONY COUNT: CPT

## 2024-02-11 LAB
BACTERIA UR CULT: ABNORMAL
ORGANISM: ABNORMAL

## 2024-02-12 LAB
EKG P AXIS: 56 DEGREES
EKG P-R INTERVAL: 226 MS
EKG Q-T INTERVAL: 378 MS
EKG QRS DURATION: 86 MS
EKG QTC CALCULATION (BAZETT): 370 MS
EKG T AXIS: 63 DEGREES

## 2024-02-16 RX ORDER — MELOXICAM 15 MG/1
TABLET ORAL
Qty: 30 TABLET | Refills: 5 | Status: SHIPPED | OUTPATIENT
Start: 2024-02-16

## 2024-02-21 ENCOUNTER — OFFICE VISIT (OUTPATIENT)
Dept: INTERNAL MEDICINE | Facility: CLINIC | Age: 89
End: 2024-02-21
Payer: MEDICARE

## 2024-02-21 VITALS
OXYGEN SATURATION: 97 % | WEIGHT: 229 LBS | HEIGHT: 74 IN | BODY MASS INDEX: 29.39 KG/M2 | TEMPERATURE: 97.8 F | DIASTOLIC BLOOD PRESSURE: 60 MMHG | HEART RATE: 62 BPM | SYSTOLIC BLOOD PRESSURE: 110 MMHG

## 2024-02-21 DIAGNOSIS — E03.9 ACQUIRED HYPOTHYROIDISM: Chronic | ICD-10-CM

## 2024-02-21 DIAGNOSIS — E11.40 TYPE 2 DIABETES MELLITUS WITH DIABETIC NEUROPATHY, WITHOUT LONG-TERM CURRENT USE OF INSULIN: Chronic | ICD-10-CM

## 2024-02-21 DIAGNOSIS — Z79.899 ENCOUNTER FOR LONG-TERM CURRENT USE OF MEDICATION: Primary | ICD-10-CM

## 2024-02-21 DIAGNOSIS — M17.11 ARTHRITIS OF RIGHT KNEE: ICD-10-CM

## 2024-02-21 DIAGNOSIS — I10 PRIMARY HYPERTENSION: Chronic | ICD-10-CM

## 2024-02-21 DIAGNOSIS — G62.9 NEUROPATHY: ICD-10-CM

## 2024-02-21 LAB
AMPHET+METHAMPHET UR QL: NEGATIVE
AMPHETAMINE INTERNAL CONTROL: NORMAL
AMPHETAMINES UR QL: NEGATIVE
BARBITURATE INTERNAL CONTROL: NORMAL
BARBITURATES UR QL SCN: NEGATIVE
BENZODIAZ UR QL SCN: NEGATIVE
BENZODIAZEPINE INTERNAL CONTROL: NORMAL
BUPRENORPHINE INTERNAL CONTROL: NORMAL
BUPRENORPHINE SERPL-MCNC: NEGATIVE NG/ML
CANNABINOIDS SERPL QL: NEGATIVE
COCAINE INTERNAL CONTROL: NORMAL
COCAINE UR QL: NEGATIVE
EXPIRATION DATE: NORMAL
Lab: NORMAL
MDMA (ECSTASY) INTERNAL CONTROL: NORMAL
MDMA UR QL SCN: NEGATIVE
METHADONE INTERNAL CONTROL: NORMAL
METHADONE UR QL SCN: NEGATIVE
METHAMPHETAMINE INTERNAL CONTROL: NORMAL
MORPHINE INTERNAL CONTROL: NORMAL
MORPHINE/OPIATES SCREEN, URINE: NEGATIVE
OXYCODONE INTERNAL CONTROL: NORMAL
OXYCODONE UR QL SCN: NEGATIVE
PCP UR QL SCN: NEGATIVE
PHENCYCLIDINE INTERNAL CONTROL: NORMAL
PROPOXYPH UR QL SCN: NEGATIVE
PROPOXYPHENE INTERNAL CONTROL: NORMAL
PROT UR-MCNC: 30 G/DL
THC INTERNAL CONTROL: NORMAL
TRICYCLIC ANTIDEPRESSANTS INTERNAL CONTROL: NORMAL
TRICYCLICS UR QL SCN: NEGATIVE

## 2024-02-21 RX ORDER — PREGABALIN 50 MG/1
50 CAPSULE ORAL 2 TIMES DAILY
Qty: 180 CAPSULE | Refills: 1 | Status: SHIPPED | OUTPATIENT
Start: 2024-02-21

## 2024-02-21 NOTE — PROGRESS NOTES
"      Chief Complaint  Hypertension, Diabetes, labs in chart from [facility] , and can pt have 3 months of lyrica?    Subjective        Sarkis Naranjo presents to [facility] PRIMARY [facility]ARE    HPI    Patient here for the above problems.  See Assessment and Plan for further HPI components.      Review of Systems    Objective   Vital Signs:  /60 (BP Location: Left arm, Patient Position: Sitting, Cuff Size: Adult)   Pulse 62   Temp 97.8 °F (36.6 °C) (Temporal)   Ht 188 cm (74\")   Wt 104 kg (229 lb)   SpO2 97%   BMI 29.40 kg/m²   Estimated body mass index is 29.4 kg/m² as calculated from the following:    Height as of this encounter: 188 cm (74\").    Weight as of this encounter: 104 kg (229 lb).      Physical Exam  Vitals and nursing note reviewed.   Constitutional:       Appearance: He is not ill-appearing.   Eyes:      General: No scleral icterus.     Conjunctiva/sclera: Conjunctivae normal.   Cardiovascular:      Rate and Rhythm: Normal rate and regular rhythm.   Pulmonary:      Effort: Pulmonary effort is normal. No respiratory distress.   Neurological:      General: No focal deficit present.      Mental Status: He is alert and oriented to person, place, and time.   Psychiatric:         Mood and Affect: Mood normal.         Behavior: Behavior normal.                         Assessment and Plan   Diagnoses and all orders for this visit:    1. Encounter for long-term current use of medication (Primary)  -     POC Medline 14 Panel Urine Drug Screen    2. Neuropathy  -     pregabalin (LYRICA) 50 MG capsule; Take 1 capsule by mouth 2 (Two) Times a Day.  Dispense: 180 capsule; Refill: 1    3. Type 2 diabetes mellitus with diabetic neuropathy, without long-term current use of insulin    4. Acquired hypothyroidism    5. Primary hypertension    6. Arthritis of right knee      Patient has hypertension.  BP is at goal.  The patient's BP goal is < 130/80.  Recommend ambulatory BP monitoring after patient " "has taken medication.  Recommend varying the times of the blood pressure readings.  Patient is currently on atenolol, amlodipine.  Recommend we continue current regimen.    Continue to monitor.      Patient has type 2 diabetes.  Current A1c is 7.2.  Based on patient's last A1c they are currently at goal.. The patient's A1c goal is 7.0 - 7.5%  Recommend routine monitoring of blood sugar. Avoid hypoglycemia. Recommend ADA diet and avoidance of excess carbohydrates. Patient is currently on dulaglutide, glipizide, and metformin.  Recommend that we continue current regimen.  .  Recommend at least annual eye examination.  Recommend at least annual diabetic foot examination.  Patient is up-to-date.  Patient follows with Dr. Wing.  Recommend checking self checks of feet routinely.Patient has the following diabetic complications: proteinuria, neuropathy, and hyperglycemia.    Continue to monitor.  Patient's last diabetic nephropathy evaluation was:  No results found for: \"CREATINIURIN\", \"MICROALBUR\", \"PROTEIN\", \"PROTEINPOCUA\"  A1C Last 3 Results          4/27/2023    11:07 10/4/2023    06:49 2/7/2024    10:30   HGBA1C Last 3 Results   Hemoglobin A1C 7.1  7.9     7.2          Details          This result is from an external source.                Patient has hyperlipidemia.  Patient is currently on moderate-intensity statin with atorvastatin 20 mg.  Patient is at goal  Recommend we continue current regimen.    Recommend increased physical activity.  Recommend a diet that focuses on consumption of fruits, vegetables, fiber, and monounsaturated fats and minimizes intake of saturated and trans fats, simple carbohydrates, and red meats. Examples of heart-healthy diets include the DASH (Dietary Approaches to Stop Hypertension) and Mediterranean diets.  Continue to monitor.    Patient has osteoarthritis of right knee.  Patient to undergo replacement Monday.  Patient holding GLP-1, NSAIDs, and antiplatelets      Result Review " :  The following data was reviewed by: Vasu Brown MD on 02/21/2024:    UA          2/7/2024    10:30   Urinalysis   Specific Gravity, UA 1.030       Blood, UA LARGE       Leukocytes, UA MODERATE       Nitrite, UA POSITIVE          Details          This result is from an external source.                                      Follow Up   Return in about 3 months (around 5/21/2024), or if symptoms worsen or fail to improve, for Recheck.  Patient was given instructions and counseling regarding his condition or for health maintenance advice. Please see specific information pulled into the AVS if appropriate.       NIK Brown MD, FACP, FH      Electronically signed by Vasu Brown MD, 02/21/24, 12:06 PM CST.

## 2024-02-26 ENCOUNTER — HOSPITAL ENCOUNTER (INPATIENT)
Age: 89
LOS: 1 days | Discharge: HOME | DRG: 470 | End: 2024-02-29
Attending: ORTHOPAEDIC SURGERY | Admitting: ORTHOPAEDIC SURGERY
Payer: MEDICARE

## 2024-02-26 ENCOUNTER — ANESTHESIA EVENT (OUTPATIENT)
Dept: OPERATING ROOM | Age: 89
DRG: 470 | End: 2024-02-26
Payer: MEDICARE

## 2024-02-26 ENCOUNTER — APPOINTMENT (OUTPATIENT)
Dept: GENERAL RADIOLOGY | Age: 89
DRG: 470 | End: 2024-02-26
Attending: ORTHOPAEDIC SURGERY
Payer: MEDICARE

## 2024-02-26 ENCOUNTER — ANESTHESIA (OUTPATIENT)
Dept: OPERATING ROOM | Age: 89
DRG: 470 | End: 2024-02-26
Payer: MEDICARE

## 2024-02-26 DIAGNOSIS — M17.11 PRIMARY OSTEOARTHRITIS OF RIGHT KNEE: Primary | ICD-10-CM

## 2024-02-26 PROBLEM — M17.10 ARTHRITIS OF KNEE: Status: ACTIVE | Noted: 2024-02-26

## 2024-02-26 LAB
GLUCOSE BLD-MCNC: 112 MG/DL (ref 70–99)
GLUCOSE BLD-MCNC: 91 MG/DL (ref 70–99)
PERFORMED ON: ABNORMAL
PERFORMED ON: NORMAL

## 2024-02-26 PROCEDURE — 3700000000 HC ANESTHESIA ATTENDED CARE: Performed by: ORTHOPAEDIC SURGERY

## 2024-02-26 PROCEDURE — 6360000002 HC RX W HCPCS: Performed by: ORTHOPAEDIC SURGERY

## 2024-02-26 PROCEDURE — 2580000003 HC RX 258: Performed by: ANESTHESIOLOGY

## 2024-02-26 PROCEDURE — 3700000001 HC ADD 15 MINUTES (ANESTHESIA): Performed by: ORTHOPAEDIC SURGERY

## 2024-02-26 PROCEDURE — 94760 N-INVAS EAR/PLS OXIMETRY 1: CPT

## 2024-02-26 PROCEDURE — 6360000002 HC RX W HCPCS

## 2024-02-26 PROCEDURE — G0378 HOSPITAL OBSERVATION PER HR: HCPCS

## 2024-02-26 PROCEDURE — 2709999900 HC NON-CHARGEABLE SUPPLY: Performed by: ORTHOPAEDIC SURGERY

## 2024-02-26 PROCEDURE — 73560 X-RAY EXAM OF KNEE 1 OR 2: CPT

## 2024-02-26 PROCEDURE — 2580000003 HC RX 258: Performed by: NURSE ANESTHETIST, CERTIFIED REGISTERED

## 2024-02-26 PROCEDURE — 2580000003 HC RX 258: Performed by: ORTHOPAEDIC SURGERY

## 2024-02-26 PROCEDURE — 94150 VITAL CAPACITY TEST: CPT

## 2024-02-26 PROCEDURE — 2500000003 HC RX 250 WO HCPCS: Performed by: NURSE ANESTHETIST, CERTIFIED REGISTERED

## 2024-02-26 PROCEDURE — 2500000003 HC RX 250 WO HCPCS: Performed by: ANESTHESIOLOGY

## 2024-02-26 PROCEDURE — 3600000015 HC SURGERY LEVEL 5 ADDTL 15MIN: Performed by: ORTHOPAEDIC SURGERY

## 2024-02-26 PROCEDURE — C9290 INJ, BUPIVACAINE LIPOSOME: HCPCS | Performed by: ORTHOPAEDIC SURGERY

## 2024-02-26 PROCEDURE — C1776 JOINT DEVICE (IMPLANTABLE): HCPCS | Performed by: ORTHOPAEDIC SURGERY

## 2024-02-26 PROCEDURE — 6370000000 HC RX 637 (ALT 250 FOR IP): Performed by: ORTHOPAEDIC SURGERY

## 2024-02-26 PROCEDURE — 64447 NJX AA&/STRD FEMORAL NRV IMG: CPT | Performed by: NURSE ANESTHETIST, CERTIFIED REGISTERED

## 2024-02-26 PROCEDURE — 2720000010 HC SURG SUPPLY STERILE: Performed by: ORTHOPAEDIC SURGERY

## 2024-02-26 PROCEDURE — 7100000000 HC PACU RECOVERY - FIRST 15 MIN: Performed by: ORTHOPAEDIC SURGERY

## 2024-02-26 PROCEDURE — 82962 GLUCOSE BLOOD TEST: CPT

## 2024-02-26 PROCEDURE — 3600000005 HC SURGERY LEVEL 5 BASE: Performed by: ORTHOPAEDIC SURGERY

## 2024-02-26 PROCEDURE — 7100000001 HC PACU RECOVERY - ADDTL 15 MIN: Performed by: ORTHOPAEDIC SURGERY

## 2024-02-26 PROCEDURE — 2700000000 HC OXYGEN THERAPY PER DAY

## 2024-02-26 PROCEDURE — A4217 STERILE WATER/SALINE, 500 ML: HCPCS | Performed by: ORTHOPAEDIC SURGERY

## 2024-02-26 PROCEDURE — 8E0Y0CZ ROBOTIC ASSISTED PROCEDURE OF LOWER EXTREMITY, OPEN APPROACH: ICD-10-PCS | Performed by: ORTHOPAEDIC SURGERY

## 2024-02-26 PROCEDURE — 6360000002 HC RX W HCPCS: Performed by: NURSE ANESTHETIST, CERTIFIED REGISTERED

## 2024-02-26 PROCEDURE — 0SRC0JA REPLACEMENT OF RIGHT KNEE JOINT WITH SYNTHETIC SUBSTITUTE, UNCEMENTED, OPEN APPROACH: ICD-10-PCS | Performed by: ORTHOPAEDIC SURGERY

## 2024-02-26 PROCEDURE — 6360000002 HC RX W HCPCS: Performed by: ANESTHESIOLOGY

## 2024-02-26 DEVICE — IMPLANTABLE DEVICE: Type: IMPLANTABLE DEVICE | Site: KNEE | Status: FUNCTIONAL

## 2024-02-26 DEVICE — PSN MC VE ASF R 10MM 8-11 EF: Type: IMPLANTABLE DEVICE | Site: KNEE | Status: FUNCTIONAL

## 2024-02-26 DEVICE — BASEPLATE TIB KEELED 0 DEG F RT KNEE SPIKE OSSEOTI PERSONA: Type: IMPLANTABLE DEVICE | Site: KNEE | Status: FUNCTIONAL

## 2024-02-26 RX ORDER — LEVOTHYROXINE SODIUM 0.05 MG/1
50 TABLET ORAL DAILY
Status: DISCONTINUED | OUTPATIENT
Start: 2024-02-27 | End: 2024-02-29 | Stop reason: HOSPADM

## 2024-02-26 RX ORDER — BUPIVACAINE HYDROCHLORIDE 7.5 MG/ML
INJECTION, SOLUTION INTRASPINAL PRN
Status: DISCONTINUED | OUTPATIENT
Start: 2024-02-26 | End: 2024-02-26 | Stop reason: SDUPTHER

## 2024-02-26 RX ORDER — LIDOCAINE HYDROCHLORIDE 10 MG/ML
INJECTION, SOLUTION INFILTRATION; PERINEURAL
Status: COMPLETED | OUTPATIENT
Start: 2024-02-26 | End: 2024-02-26

## 2024-02-26 RX ORDER — ROPIVACAINE HYDROCHLORIDE 5 MG/ML
INJECTION, SOLUTION EPIDURAL; INFILTRATION; PERINEURAL
Status: COMPLETED | OUTPATIENT
Start: 2024-02-26 | End: 2024-02-26

## 2024-02-26 RX ORDER — TRAMADOL HYDROCHLORIDE 50 MG/1
50 TABLET ORAL EVERY 6 HOURS
Status: DISCONTINUED | OUTPATIENT
Start: 2024-02-26 | End: 2024-02-29 | Stop reason: HOSPADM

## 2024-02-26 RX ORDER — HYDROMORPHONE HYDROCHLORIDE 1 MG/ML
0.5 INJECTION, SOLUTION INTRAMUSCULAR; INTRAVENOUS; SUBCUTANEOUS EVERY 5 MIN PRN
Status: DISCONTINUED | OUTPATIENT
Start: 2024-02-26 | End: 2024-02-26 | Stop reason: HOSPADM

## 2024-02-26 RX ORDER — PREGABALIN 75 MG/1
75 CAPSULE ORAL ONCE
Status: COMPLETED | OUTPATIENT
Start: 2024-02-26 | End: 2024-02-26

## 2024-02-26 RX ORDER — LIDOCAINE HYDROCHLORIDE 10 MG/ML
INJECTION, SOLUTION EPIDURAL; INFILTRATION; INTRACAUDAL; PERINEURAL PRN
Status: DISCONTINUED | OUTPATIENT
Start: 2024-02-26 | End: 2024-02-26 | Stop reason: SDUPTHER

## 2024-02-26 RX ORDER — ONDANSETRON 2 MG/ML
INJECTION INTRAMUSCULAR; INTRAVENOUS PRN
Status: DISCONTINUED | OUTPATIENT
Start: 2024-02-26 | End: 2024-02-26 | Stop reason: SDUPTHER

## 2024-02-26 RX ORDER — HYDROMORPHONE HYDROCHLORIDE 1 MG/ML
0.5 INJECTION, SOLUTION INTRAMUSCULAR; INTRAVENOUS; SUBCUTANEOUS
Status: DISCONTINUED | OUTPATIENT
Start: 2024-02-26 | End: 2024-02-29 | Stop reason: HOSPADM

## 2024-02-26 RX ORDER — SODIUM CHLORIDE 0.9 % (FLUSH) 0.9 %
5-40 SYRINGE (ML) INJECTION PRN
Status: DISCONTINUED | OUTPATIENT
Start: 2024-02-26 | End: 2024-02-26 | Stop reason: HOSPADM

## 2024-02-26 RX ORDER — DIPHENHYDRAMINE HYDROCHLORIDE 50 MG/ML
25 INJECTION INTRAMUSCULAR; INTRAVENOUS EVERY 6 HOURS PRN
Status: DISCONTINUED | OUTPATIENT
Start: 2024-02-26 | End: 2024-02-29 | Stop reason: HOSPADM

## 2024-02-26 RX ORDER — SODIUM CHLORIDE, SODIUM LACTATE, POTASSIUM CHLORIDE, CALCIUM CHLORIDE 600; 310; 30; 20 MG/100ML; MG/100ML; MG/100ML; MG/100ML
INJECTION, SOLUTION INTRAVENOUS CONTINUOUS
Status: DISCONTINUED | OUTPATIENT
Start: 2024-02-26 | End: 2024-02-26 | Stop reason: HOSPADM

## 2024-02-26 RX ORDER — CYCLOBENZAPRINE HCL 10 MG
10 TABLET ORAL EVERY 12 HOURS PRN
Status: DISCONTINUED | OUTPATIENT
Start: 2024-02-26 | End: 2024-02-29 | Stop reason: HOSPADM

## 2024-02-26 RX ORDER — SODIUM CHLORIDE 0.9 % (FLUSH) 0.9 %
5-40 SYRINGE (ML) INJECTION PRN
Status: DISCONTINUED | OUTPATIENT
Start: 2024-02-26 | End: 2024-02-29 | Stop reason: HOSPADM

## 2024-02-26 RX ORDER — OXYCODONE HCL 10 MG/1
10 TABLET, FILM COATED, EXTENDED RELEASE ORAL ONCE
Status: COMPLETED | OUTPATIENT
Start: 2024-02-26 | End: 2024-02-26

## 2024-02-26 RX ORDER — PREGABALIN 50 MG/1
50 CAPSULE ORAL 2 TIMES DAILY
Status: DISCONTINUED | OUTPATIENT
Start: 2024-02-26 | End: 2024-02-29 | Stop reason: HOSPADM

## 2024-02-26 RX ORDER — MEPERIDINE HYDROCHLORIDE 25 MG/ML
12.5 INJECTION INTRAMUSCULAR; INTRAVENOUS; SUBCUTANEOUS EVERY 5 MIN PRN
Status: DISCONTINUED | OUTPATIENT
Start: 2024-02-26 | End: 2024-02-26 | Stop reason: HOSPADM

## 2024-02-26 RX ORDER — SODIUM CHLORIDE 9 MG/ML
INJECTION, SOLUTION INTRAVENOUS PRN
Status: DISCONTINUED | OUTPATIENT
Start: 2024-02-26 | End: 2024-02-29 | Stop reason: HOSPADM

## 2024-02-26 RX ORDER — SODIUM CHLORIDE 9 MG/ML
INJECTION, SOLUTION INTRAVENOUS CONTINUOUS
Status: DISCONTINUED | OUTPATIENT
Start: 2024-02-26 | End: 2024-02-29 | Stop reason: HOSPADM

## 2024-02-26 RX ORDER — SODIUM CHLORIDE 0.9 % (FLUSH) 0.9 %
5-40 SYRINGE (ML) INJECTION EVERY 12 HOURS SCHEDULED
Status: DISCONTINUED | OUTPATIENT
Start: 2024-02-26 | End: 2024-02-26 | Stop reason: HOSPADM

## 2024-02-26 RX ORDER — CALCIUM CARBONATE/VITAMIN D3 600MG-62.5
1 CAPSULE ORAL DAILY
Status: DISCONTINUED | OUTPATIENT
Start: 2024-02-26 | End: 2024-02-26

## 2024-02-26 RX ORDER — HYDROMORPHONE HYDROCHLORIDE 1 MG/ML
1 INJECTION, SOLUTION INTRAMUSCULAR; INTRAVENOUS; SUBCUTANEOUS
Status: DISCONTINUED | OUTPATIENT
Start: 2024-02-26 | End: 2024-02-29 | Stop reason: HOSPADM

## 2024-02-26 RX ORDER — LINEZOLID 2 MG/ML
600 INJECTION, SOLUTION INTRAVENOUS EVERY 12 HOURS
Status: COMPLETED | OUTPATIENT
Start: 2024-02-26 | End: 2024-02-27

## 2024-02-26 RX ORDER — OXYCODONE HYDROCHLORIDE 5 MG/1
5 TABLET ORAL EVERY 4 HOURS PRN
Status: DISCONTINUED | OUTPATIENT
Start: 2024-02-26 | End: 2024-02-29 | Stop reason: HOSPADM

## 2024-02-26 RX ORDER — ONDANSETRON 4 MG/1
4 TABLET, ORALLY DISINTEGRATING ORAL EVERY 8 HOURS PRN
Status: DISCONTINUED | OUTPATIENT
Start: 2024-02-26 | End: 2024-02-29 | Stop reason: HOSPADM

## 2024-02-26 RX ORDER — MIDAZOLAM HYDROCHLORIDE 2 MG/2ML
2 INJECTION, SOLUTION INTRAMUSCULAR; INTRAVENOUS ONCE
Status: COMPLETED | OUTPATIENT
Start: 2024-02-26 | End: 2024-02-26

## 2024-02-26 RX ORDER — ONDANSETRON 2 MG/ML
4 INJECTION INTRAMUSCULAR; INTRAVENOUS EVERY 6 HOURS PRN
Status: DISCONTINUED | OUTPATIENT
Start: 2024-02-26 | End: 2024-02-29 | Stop reason: HOSPADM

## 2024-02-26 RX ORDER — TRANEXAMIC ACID 100 MG/ML
INJECTION, SOLUTION INTRAVENOUS PRN
Status: DISCONTINUED | OUTPATIENT
Start: 2024-02-26 | End: 2024-02-26 | Stop reason: SDUPTHER

## 2024-02-26 RX ORDER — 0.9 % SODIUM CHLORIDE 0.9 %
500 INTRAVENOUS SOLUTION INTRAVENOUS PRN
Status: DISCONTINUED | OUTPATIENT
Start: 2024-02-26 | End: 2024-02-29 | Stop reason: HOSPADM

## 2024-02-26 RX ORDER — PROPOFOL 10 MG/ML
INJECTION, EMULSION INTRAVENOUS CONTINUOUS PRN
Status: DISCONTINUED | OUTPATIENT
Start: 2024-02-26 | End: 2024-02-26 | Stop reason: SDUPTHER

## 2024-02-26 RX ORDER — METOCLOPRAMIDE HYDROCHLORIDE 5 MG/ML
10 INJECTION INTRAMUSCULAR; INTRAVENOUS
Status: DISCONTINUED | OUTPATIENT
Start: 2024-02-26 | End: 2024-02-26 | Stop reason: HOSPADM

## 2024-02-26 RX ORDER — AMPICILLIN TRIHYDRATE 250 MG
1000 CAPSULE ORAL DAILY
Status: DISCONTINUED | OUTPATIENT
Start: 2024-02-26 | End: 2024-02-26

## 2024-02-26 RX ORDER — FINASTERIDE 5 MG/1
5 TABLET, FILM COATED ORAL DAILY
Status: DISCONTINUED | OUTPATIENT
Start: 2024-02-26 | End: 2024-02-29 | Stop reason: HOSPADM

## 2024-02-26 RX ORDER — POLYETHYLENE GLYCOL 3350 17 G/17G
17 POWDER, FOR SOLUTION ORAL DAILY
Status: DISCONTINUED | OUTPATIENT
Start: 2024-02-26 | End: 2024-02-29 | Stop reason: HOSPADM

## 2024-02-26 RX ORDER — DEXAMETHASONE SODIUM PHOSPHATE 10 MG/ML
10 INJECTION, SOLUTION INTRAMUSCULAR; INTRAVENOUS ONCE
Status: DISCONTINUED | OUTPATIENT
Start: 2024-02-26 | End: 2024-02-26 | Stop reason: HOSPADM

## 2024-02-26 RX ORDER — SODIUM CHLORIDE 0.9 % (FLUSH) 0.9 %
5-40 SYRINGE (ML) INJECTION EVERY 12 HOURS SCHEDULED
Status: DISCONTINUED | OUTPATIENT
Start: 2024-02-26 | End: 2024-02-29 | Stop reason: HOSPADM

## 2024-02-26 RX ORDER — ACETAMINOPHEN 500 MG
1000 TABLET ORAL ONCE
Status: COMPLETED | OUTPATIENT
Start: 2024-02-26 | End: 2024-02-26

## 2024-02-26 RX ORDER — DEXTROSE MONOHYDRATE 100 MG/ML
INJECTION, SOLUTION INTRAVENOUS CONTINUOUS PRN
Status: DISCONTINUED | OUTPATIENT
Start: 2024-02-26 | End: 2024-02-29 | Stop reason: HOSPADM

## 2024-02-26 RX ORDER — ROPIVACAINE HYDROCHLORIDE 5 MG/ML
30 INJECTION, SOLUTION EPIDURAL; INFILTRATION; PERINEURAL ONCE
Status: DISCONTINUED | OUTPATIENT
Start: 2024-02-26 | End: 2024-02-26 | Stop reason: HOSPADM

## 2024-02-26 RX ORDER — TAMSULOSIN HYDROCHLORIDE 0.4 MG/1
0.4 CAPSULE ORAL DAILY
Status: DISCONTINUED | OUTPATIENT
Start: 2024-02-26 | End: 2024-02-29 | Stop reason: HOSPADM

## 2024-02-26 RX ORDER — DIPHENHYDRAMINE HCL 25 MG
25 TABLET ORAL EVERY 6 HOURS PRN
Status: DISCONTINUED | OUTPATIENT
Start: 2024-02-26 | End: 2024-02-29 | Stop reason: HOSPADM

## 2024-02-26 RX ORDER — MELOXICAM 7.5 MG/1
15 TABLET ORAL DAILY
Status: DISCONTINUED | OUTPATIENT
Start: 2024-02-27 | End: 2024-02-29 | Stop reason: HOSPADM

## 2024-02-26 RX ORDER — OXYCODONE HYDROCHLORIDE 10 MG/1
10 TABLET ORAL EVERY 4 HOURS PRN
Status: DISCONTINUED | OUTPATIENT
Start: 2024-02-26 | End: 2024-02-29 | Stop reason: HOSPADM

## 2024-02-26 RX ORDER — DIPHENHYDRAMINE HYDROCHLORIDE 50 MG/ML
12.5 INJECTION INTRAMUSCULAR; INTRAVENOUS
Status: DISCONTINUED | OUTPATIENT
Start: 2024-02-26 | End: 2024-02-26 | Stop reason: HOSPADM

## 2024-02-26 RX ORDER — ATENOLOL 25 MG/1
50 TABLET ORAL NIGHTLY
Status: DISCONTINUED | OUTPATIENT
Start: 2024-02-26 | End: 2024-02-29 | Stop reason: HOSPADM

## 2024-02-26 RX ORDER — ACETAMINOPHEN 325 MG/1
650 TABLET ORAL EVERY 6 HOURS
Status: DISCONTINUED | OUTPATIENT
Start: 2024-02-26 | End: 2024-02-29 | Stop reason: HOSPADM

## 2024-02-26 RX ORDER — BUPIVACAINE HYDROCHLORIDE 7.5 MG/ML
INJECTION, SOLUTION EPIDURAL; RETROBULBAR
Status: COMPLETED | OUTPATIENT
Start: 2024-02-26 | End: 2024-02-26

## 2024-02-26 RX ORDER — BISACODYL 5 MG/1
5 TABLET, DELAYED RELEASE ORAL DAILY
Status: DISCONTINUED | OUTPATIENT
Start: 2024-02-26 | End: 2024-02-29 | Stop reason: HOSPADM

## 2024-02-26 RX ORDER — HYDROMORPHONE HYDROCHLORIDE 1 MG/ML
0.25 INJECTION, SOLUTION INTRAMUSCULAR; INTRAVENOUS; SUBCUTANEOUS
Status: DISCONTINUED | OUTPATIENT
Start: 2024-02-26 | End: 2024-02-29 | Stop reason: HOSPADM

## 2024-02-26 RX ORDER — SODIUM CHLORIDE 9 MG/ML
INJECTION, SOLUTION INTRAVENOUS PRN
Status: DISCONTINUED | OUTPATIENT
Start: 2024-02-26 | End: 2024-02-26 | Stop reason: HOSPADM

## 2024-02-26 RX ORDER — HYDROMORPHONE HYDROCHLORIDE 1 MG/ML
0.25 INJECTION, SOLUTION INTRAMUSCULAR; INTRAVENOUS; SUBCUTANEOUS EVERY 5 MIN PRN
Status: DISCONTINUED | OUTPATIENT
Start: 2024-02-26 | End: 2024-02-26 | Stop reason: HOSPADM

## 2024-02-26 RX ORDER — INSULIN GLARGINE 100 [IU]/ML
22 INJECTION, SOLUTION SUBCUTANEOUS NIGHTLY
Status: DISCONTINUED | OUTPATIENT
Start: 2024-02-26 | End: 2024-02-29 | Stop reason: HOSPADM

## 2024-02-26 RX ORDER — ASPIRIN 81 MG/1
81 TABLET ORAL 2 TIMES DAILY
Qty: 42 TABLET | Refills: 0 | OUTPATIENT
Start: 2024-02-26

## 2024-02-26 RX ORDER — ATORVASTATIN CALCIUM 20 MG/1
20 TABLET, FILM COATED ORAL NIGHTLY
Status: DISCONTINUED | OUTPATIENT
Start: 2024-02-26 | End: 2024-02-29 | Stop reason: HOSPADM

## 2024-02-26 RX ORDER — CELECOXIB 200 MG/1
200 CAPSULE ORAL ONCE
Status: COMPLETED | OUTPATIENT
Start: 2024-02-26 | End: 2024-02-26

## 2024-02-26 RX ORDER — GLIPIZIDE 10 MG/1
10 TABLET ORAL 2 TIMES DAILY PRN
Status: DISCONTINUED | OUTPATIENT
Start: 2024-02-26 | End: 2024-02-29 | Stop reason: HOSPADM

## 2024-02-26 RX ORDER — SODIUM CHLORIDE, SODIUM LACTATE, POTASSIUM CHLORIDE, CALCIUM CHLORIDE 600; 310; 30; 20 MG/100ML; MG/100ML; MG/100ML; MG/100ML
INJECTION, SOLUTION INTRAVENOUS CONTINUOUS PRN
Status: DISCONTINUED | OUTPATIENT
Start: 2024-02-26 | End: 2024-02-26 | Stop reason: SDUPTHER

## 2024-02-26 RX ORDER — MIDAZOLAM HYDROCHLORIDE 1 MG/ML
INJECTION INTRAMUSCULAR; INTRAVENOUS
Status: COMPLETED
Start: 2024-02-26 | End: 2024-02-26

## 2024-02-26 RX ORDER — CIPROFLOXACIN 2 MG/ML
400 INJECTION, SOLUTION INTRAVENOUS EVERY 12 HOURS
Status: COMPLETED | OUTPATIENT
Start: 2024-02-26 | End: 2024-02-28

## 2024-02-26 RX ORDER — ASPIRIN 81 MG/1
81 TABLET, CHEWABLE ORAL 2 TIMES DAILY
Status: DISCONTINUED | OUTPATIENT
Start: 2024-02-26 | End: 2024-02-29 | Stop reason: HOSPADM

## 2024-02-26 RX ADMIN — TRANEXAMIC ACID 1000 MG: 1 INJECTION, SOLUTION INTRAVENOUS at 16:50

## 2024-02-26 RX ADMIN — ACETAMINOPHEN 650 MG: 325 TABLET ORAL at 21:43

## 2024-02-26 RX ADMIN — METFORMIN HYDROCHLORIDE 1000 MG: 500 TABLET ORAL at 21:42

## 2024-02-26 RX ADMIN — CEFAZOLIN 2000 MG: 2 INJECTION, POWDER, FOR SOLUTION INTRAMUSCULAR; INTRAVENOUS at 14:52

## 2024-02-26 RX ADMIN — ASPIRIN 81 MG: 81 TABLET, CHEWABLE ORAL at 21:42

## 2024-02-26 RX ADMIN — CELECOXIB 200 MG: 200 CAPSULE ORAL at 12:34

## 2024-02-26 RX ADMIN — TAMSULOSIN HYDROCHLORIDE 0.4 MG: 0.4 CAPSULE ORAL at 21:42

## 2024-02-26 RX ADMIN — INSULIN GLARGINE 22 UNITS: 100 INJECTION, SOLUTION SUBCUTANEOUS at 21:44

## 2024-02-26 RX ADMIN — ACETAMINOPHEN 1000 MG: 500 TABLET ORAL at 12:34

## 2024-02-26 RX ADMIN — FINASTERIDE 5 MG: 5 TABLET, FILM COATED ORAL at 21:42

## 2024-02-26 RX ADMIN — POLYETHYLENE GLYCOL 3350 17 G: 17 POWDER, FOR SOLUTION ORAL at 21:41

## 2024-02-26 RX ADMIN — BISACODYL 5 MG: 5 TABLET, COATED ORAL at 21:42

## 2024-02-26 RX ADMIN — ATENOLOL 50 MG: 25 TABLET ORAL at 21:51

## 2024-02-26 RX ADMIN — SODIUM CHLORIDE, POTASSIUM CHLORIDE, SODIUM LACTATE AND CALCIUM CHLORIDE: 600; 310; 30; 20 INJECTION, SOLUTION INTRAVENOUS at 12:30

## 2024-02-26 RX ADMIN — LIDOCAINE HYDROCHLORIDE 3 ML: 10 INJECTION, SOLUTION EPIDURAL; INFILTRATION; INTRACAUDAL; PERINEURAL at 15:07

## 2024-02-26 RX ADMIN — CIPROFLOXACIN 400 MG: 400 INJECTION, SOLUTION INTRAVENOUS at 21:40

## 2024-02-26 RX ADMIN — ATORVASTATIN CALCIUM 20 MG: 20 TABLET, FILM COATED ORAL at 21:43

## 2024-02-26 RX ADMIN — BUPIVACAINE HYDROCHLORIDE 15 MG: 7.5 INJECTION, SOLUTION EPIDURAL; RETROBULBAR at 15:00

## 2024-02-26 RX ADMIN — PREGABALIN 75 MG: 75 CAPSULE ORAL at 12:35

## 2024-02-26 RX ADMIN — MIDAZOLAM HYDROCHLORIDE 2 MG: 2 INJECTION, SOLUTION INTRAMUSCULAR; INTRAVENOUS at 14:13

## 2024-02-26 RX ADMIN — PROPOFOL 75 MCG/KG/MIN: 10 INJECTION, EMULSION INTRAVENOUS at 15:13

## 2024-02-26 RX ADMIN — ONDANSETRON 4 MG: 2 INJECTION INTRAMUSCULAR; INTRAVENOUS at 16:45

## 2024-02-26 RX ADMIN — BUPIVACAINE HYDROCHLORIDE IN DEXTROSE 2 ML: 7.5 INJECTION, SOLUTION SUBARACHNOID at 15:12

## 2024-02-26 RX ADMIN — ROPIVACAINE HYDROCHLORIDE 20 ML: 5 INJECTION, SOLUTION EPIDURAL; INFILTRATION; PERINEURAL at 14:16

## 2024-02-26 RX ADMIN — SODIUM CHLORIDE, SODIUM LACTATE, POTASSIUM CHLORIDE, AND CALCIUM CHLORIDE: 600; 310; 30; 20 INJECTION, SOLUTION INTRAVENOUS at 16:30

## 2024-02-26 RX ADMIN — SODIUM CHLORIDE, SODIUM LACTATE, POTASSIUM CHLORIDE, AND CALCIUM CHLORIDE: 600; 310; 30; 20 INJECTION, SOLUTION INTRAVENOUS at 14:51

## 2024-02-26 RX ADMIN — OXYCODONE HYDROCHLORIDE 10 MG: 10 TABLET, FILM COATED, EXTENDED RELEASE ORAL at 12:34

## 2024-02-26 RX ADMIN — TRANEXAMIC ACID 1000 MG: 1 INJECTION, SOLUTION INTRAVENOUS at 15:15

## 2024-02-26 RX ADMIN — TRAMADOL HYDROCHLORIDE 50 MG: 50 TABLET, COATED ORAL at 21:43

## 2024-02-26 RX ADMIN — PREGABALIN 50 MG: 50 CAPSULE ORAL at 21:43

## 2024-02-26 RX ADMIN — MIDAZOLAM 2 MG: 1 INJECTION INTRAMUSCULAR; INTRAVENOUS at 14:13

## 2024-02-26 RX ADMIN — LIDOCAINE HYDROCHLORIDE 1 ML: 10 INJECTION, SOLUTION INFILTRATION; PERINEURAL at 14:16

## 2024-02-26 RX ADMIN — LINEZOLID 600 MG: 600 INJECTION, SOLUTION INTRAVENOUS at 13:42

## 2024-02-26 ASSESSMENT — PAIN SCALES - GENERAL: PAINLEVEL_OUTOF10: 2

## 2024-02-26 ASSESSMENT — LIFESTYLE VARIABLES: SMOKING_STATUS: 0

## 2024-02-26 ASSESSMENT — PAIN - FUNCTIONAL ASSESSMENT: PAIN_FUNCTIONAL_ASSESSMENT: NONE - DENIES PAIN

## 2024-02-26 NOTE — H&P
139 02/07/2024 10:30 AM    K 4.6 02/07/2024 10:30 AM     02/07/2024 10:30 AM    CO2 25 02/07/2024 10:30 AM    BUN 20 02/07/2024 10:30 AM    LABALBU 4.3 02/07/2024 10:30 AM    CREATININE 0.9 02/07/2024 10:30 AM    CALCIUM 9.8 02/07/2024 10:30 AM    LABGLOM >60 02/07/2024 10:30 AM    GLUCOSE 195 02/07/2024 10:30 AM     Sodium:    Lab Results   Component Value Date/Time     02/07/2024 10:30 AM     Potassium:    Lab Results   Component Value Date/Time    K 4.6 02/07/2024 10:30 AM     BUN/Creatinine:    Lab Results   Component Value Date/Time    BUN 20 02/07/2024 10:30 AM    CREATININE 0.9 02/07/2024 10:30 AM     PT/INR:    Lab Results   Component Value Date/Time    PROTIME 13.2 02/07/2024 10:30 AM    INR 1.03 02/07/2024 10:30 AM     PTT:    Lab Results   Component Value Date/Time    APTT 25.9 02/07/2024 10:30 AM   [APTT}  U/A:    Lab Results   Component Value Date/Time    COLORU YELLOW 02/07/2024 10:30 AM    PHUR 5.0 02/07/2024 10:30 AM    WBCUA TNTC 02/07/2024 10:30 AM    RBCUA 6-10 02/07/2024 10:30 AM    BACTERIA 4+ 02/07/2024 10:30 AM    CLARITYU CLOUDY 02/07/2024 10:30 AM    SPECGRAV 1.030 02/07/2024 10:30 AM    LEUKOCYTESUR MODERATE 02/07/2024 10:30 AM    UROBILINOGEN 0.2 02/07/2024 10:30 AM    BILIRUBINUR Negative 02/07/2024 10:30 AM    BLOODU LARGE 02/07/2024 10:30 AM    GLUCOSEU =>1000 02/07/2024 10:30 AM     HgBA1c:  No components found for: \"HGBA1C\"        PLAN:  Knee replacement      Electronically signed by Chet Kumar MD on 2/26/2024 at 1:22 PM

## 2024-02-26 NOTE — ANESTHESIA PROCEDURE NOTES
Peripheral Block    Patient location during procedure: holding area  Reason for block: post-op pain management  Start time: 2/26/2024 2:16 PM  Staffing  Performed: anesthesiologist   Anesthesiologist: Cristin Prasad MD  Performed by: Cristin Prasad MD  Authorized by: Cristin Prasad MD    Preanesthetic Checklist  Completed: patient identified, IV checked, site marked, risks and benefits discussed, surgical/procedural consents, equipment checked, pre-op evaluation, timeout performed, anesthesia consent given, oxygen available and monitors applied/VS acknowledged  Peripheral Block   Patient position: supine  Prep: ChloraPrep  Provider prep: mask and sterile gloves  Patient monitoring: cardiac monitor, continuous pulse ox, frequent blood pressure checks and IV access  Block type: Femoral  Adductor canal  Laterality: right  Injection technique: single-shot  Guidance: ultrasound guided  Local infiltration: ropivacaine  Infiltration strength: 0.5 %  Local infiltration: ropivacaine  Dose: 20 mL    Needle   Needle type: combined needle/nerve stimulator   Needle gauge: 22 G  Needle localization: ultrasound guidance  Needle length: 10 cm  Assessment   Injection assessment: negative aspiration for heme, no paresthesia on injection and local visualized surrounding nerve on ultrasound  Paresthesia pain: none  Slow fractionated injection: yes  Hemodynamics: stable  Outcomes: uncomplicated and patient tolerated procedure well    Additional Notes  Under ultrasound (\"US\") guidance, a 22 gauge needle was inserted and placed in close proximity to the femoral nerve. Ultrasound was also used to visualize the spread of the anesthetic in close proximity to the nerve being blocked. The nerve appeared anatomically normal, and there were no abnormal pathological findings. A permanent image was recorded.     20 mL 0.5% Ropivacaine injected  Medications Administered  lidocaine injection 1% - Perineural   1 mL - 2/26/2024 2:16:00

## 2024-02-26 NOTE — BRIEF OP NOTE
Brief Postoperative Note      Patient: Tanmay Gilmore  YOB: 1935  MRN: [id number]    Date of Procedure: 2/26/2024    Pre-Op Diagnosis Codes:     * Unilateral primary osteoarthritis, right knee [M17.11]    Post-Op Diagnosis: Same       Procedure(s):  ROBOTIC COMPLEX RIGHT TOTAL KNEE ARTHROPLASTY    Surgeon(s):  Chet Kumar MD    Assistant:  First Assistant: Alexy Newton PA-C    Anesthesia: Choice    Estimated Blood Loss (mL): 200     Complications: None    Specimens:   * No specimens in log *    Implants:  Implant Name Type Inv. Item Serial No.  Lot No. LRB No. Used Action   BASEPLATE TIB KEELED 0 DEG F RT KNEE SPIKE OSSEOTI PERSONA - [id number]  BASEPLATE TIB KEELED 0 DEG F RT KNEE SPIKE OSSEOTI PERSONA  [employer]- [id number] Right 1 Implanted   COMPONENT FEM CR JUAN 11 RT KNEE MICHELE COCR STRL PERSONA LTX - [id number]  COMPONENT FEM CR JUAN 11 RT KNEE MICHELE COCR STRL PERSONA LTX  [employer]- [id number] Right 1 Implanted   PSN MC VE ASF R 10MM 8-11 EF - [id number]  PSN MC VE ASF R 10MM 8-11 EF  [employer]- [id number] Right 1 Implanted         Drains: * No LDAs found *    Findings:       Electronically signed by Chet Kumar MD on 2/26/2024 at 5:13 PM

## 2024-02-26 NOTE — DISCHARGE INSTRUCTIONS
Dr Kumar Total Knee Replacement Discharge Instructions     *Elevate legs at all times when not walking  * Use Ice Pack to affected extremity as needed for swelling and pain     * HOMEWORK          Be able to fully straighten leg by post-op appointment.  This is the most important thing to work on!!!          Use the ankle pillow or a towel roll under the ankle to work on straightening          You may work on bending the knee also    *Surgical Site Care:         The adhesive (glue strip) dressing can be removed in 3 weeks.         May shower on Wednesday and clean wound but do not scrub incision. Pat dry.          NO tub bathing or swimming.         Wash hands frequently during the day.     *Activity:      SAFETY FIRST ! WALK WITH A WALKER !!!                 Home Health therapy will come to the house three times a week:                    Get in and out of your bed                                                                                           Getting up and down out of the chair                                                                   Bathroom  / bathing activities                                                                                Do NOT walk for exercise ( it will increase pain and swelling )                            Walk several times a day but only for short distances             *Pain Medicines:          Keep a LOG of your medicines to track when pain med is due          Take prescribed medicine as needed for pain          Take Tylenol as your pain decreases          Take a stool softener of your choice while taking pain medications as they are very constipating ( examples:  colace  dulcolax  fiber   prune juice )      *To prevent blood clots:          Take prescribed blood thinner as directed.  (Aspirin 81 mg two times daily for 6 weeks)          Do ankle pump exercises when sitting in the chair           *To Prevent Pneumonia:           Sit up and take deep breaths

## 2024-02-26 NOTE — ANESTHESIA PRE PROCEDURE
Department of Anesthesiology  Preprocedure Note       Name:  Tanmay Gilmore   Age:  88 y.o.  :  1935                                          MRN:  [id number]         Date:  2024      Surgeon: Surgeon(s):  Chet Kumar MD    Procedure: Procedure(s):  ROBOTIC RIGHT TOTAL KNEE ARTHROPLASTY    Medications prior to admission:   Prior to Admission medications    Medication Sig Start Date End Date Taking? Authorizing Provider   metFORMIN (GLUCOPHAGE) 1000 MG tablet Take 1 tablet by mouth 2 times daily (with meals)    Joseph Auguste MD   dulaglutide (TRULICITY) 1.5 MG/0.5ML SC injection Inject 0.5 mLs into the skin once a week    Joseph Auguste MD   insulin glargine (LANTUS) 100 UNIT/ML injection vial Inject 22 Units into the skin nightly    Joseph Auguste MD   glipiZIDE (GLUCOTROL) 10 MG tablet Take 1 tablet by mouth 2 times daily as needed (hyperglycemia)    Joseph Auguste MD   amLODIPine (NORVASC) 2.5 MG tablet Take 1 tablet by mouth daily    Joseph Auguste MD   atenolol (TENORMIN) 50 MG tablet Take 1 tablet by mouth daily    Joseph Auguste MD   atorvastatin (LIPITOR) 20 MG tablet Take 1 tablet by mouth daily    Joseph Auguste MD   levothyroxine (SYNTHROID) 50 MCG tablet Take 1 tablet by mouth Daily    Joseph Auguste MD   meloxicam (MOBIC) 15 MG tablet Take 1 tablet by mouth daily    Joseph Auguste MD   pregabalin (LYRICA) 50 MG capsule Take 1 capsule by mouth 2 times daily.    Joseph Auguste MD   tamsulosin (FLOMAX) 0.4 MG capsule Take 1 capsule by mouth daily    Joseph Auguste MD   dutasteride (AVODART) 0.5 MG capsule Take 1 capsule by mouth daily    Joseph Auguste MD   aspirin 81 MG chewable tablet Take 1 tablet by mouth daily    Joseph Auguste MD   NONFORMULARY Take 3 capsules by mouth daily Balance of nature    Joseph Auguste MD   Calcium Carb-Cholecalciferol (CALCIUM PLUS D3 ABSORBABLE) 600-62.5 MG-MCG

## 2024-02-26 NOTE — ANESTHESIA PROCEDURE NOTES
Spinal Block    Patient location during procedure: OR  End time: 2/26/2024 3:12 PM  Reason for block: primary anesthetic  Staffing  Performed: resident/CRNA   Resident/CRNA: Wilian Pickard APRN - CRNA  Performed by: Wilian Pickard APRN - CRNA  Authorized by: Wilian Pickard APRN - CRNA    Spinal Block  Patient position: sitting  Prep: Betadine  Patient monitoring: cardiac monitor, continuous pulse ox and frequent blood pressure checks  Approach: midline  Location: L3/L4  Provider prep: mask and sterile gloves  Needle  Needle type: Pencan   Needle gauge: 25 G  Needle length: 3.5 in  Needle insertion depth: 8 cm  Expiration date: 1/31/2026  Assessment  Sensory level: T10  Events: cerebrospinal fluid  Swirl obtained: Yes  CSF: clear  Attempts: 2  Hemodynamics: stable  Additional Notes  1500 sitting position, monitors placed, O2 per simple mask. 3 mls 1% lidocaine LA. Introducer placed, spinal needle inserted, redirected, unable to access. Moved I level up, clear CSF obtained. 2 mls 0.75% bupivacaine slowly injected after swirl obtained. Needles removed. Lying flat. VSS   Preanesthetic Checklist  Completed: patient identified, IV checked, site marked, risks and benefits discussed, surgical/procedural consents, equipment checked, pre-op evaluation, timeout performed, anesthesia consent given, oxygen available, monitors applied/VS acknowledged, fire risk safety assessment completed and verbalized and blood product R/B/A discussed and consented

## 2024-02-26 NOTE — ANESTHESIA POSTPROCEDURE EVALUATION
Department of Anesthesiology  Postprocedure Note    Patient: Tanmay Gilmore  MRN: [id number]  YOB: 1935  Date of evaluation: 2/26/2024    Procedure Summary       Date: 02/26/24 Room / Location: [address]    Anesthesia Start: 1451 Anesthesia Stop: 1730    Procedure: ROBOTIC COMPLEX RIGHT TOTAL KNEE ARTHROPLASTY (Right) Diagnosis:       Unilateral primary osteoarthritis, right knee      (Unilateral primary osteoarthritis, right knee [M17.11])    Surgeons: Chet Kumar MD Responsible Provider: Wilian Pickard APRN - CRNA    Anesthesia Type: regional, spinal ASA Status: 3            Anesthesia Type: No value filed.    Dariel Phase I: Dariel Score: 5    Dariel Phase II:      Anesthesia Post Evaluation    Patient location during evaluation: bedside  Patient participation: complete - patient participated  Level of consciousness: awake and alert  Pain score: 0  Airway patency: patent  Nausea & Vomiting: no nausea  Cardiovascular status: hemodynamically stable  Respiratory status: acceptable  Hydration status: euvolemic  Comments: BP (!) 105/59   Pulse 53   Temp 97.6 °F (36.4 °C)   Resp 14   Ht 1.88 m (6' 2\")   Wt 106.1 kg (234 lb)   SpO2 92%   BMI 30.04 kg/m²       No notable events documented.

## 2024-02-26 NOTE — DISCHARGE INSTR - DIET

## 2024-02-27 PROBLEM — M17.11 PRIMARY OSTEOARTHRITIS OF RIGHT KNEE: Status: ACTIVE | Noted: 2024-02-27

## 2024-02-27 LAB
ANION GAP SERPL CALCULATED.3IONS-SCNC: 12 MMOL/L (ref 7–19)
BUN SERPL-MCNC: 20 MG/DL (ref 8–23)
CALCIUM SERPL-MCNC: 8.4 MG/DL (ref 8.8–10.2)
CHLORIDE SERPL-SCNC: 100 MMOL/L (ref 98–111)
CO2 SERPL-SCNC: 23 MMOL/L (ref 22–29)
CREAT SERPL-MCNC: 1 MG/DL (ref 0.5–1.2)
GLUCOSE BLD-MCNC: 163 MG/DL (ref 70–99)
GLUCOSE BLD-MCNC: 166 MG/DL (ref 70–99)
GLUCOSE BLD-MCNC: 200 MG/DL (ref 70–99)
GLUCOSE BLD-MCNC: 203 MG/DL (ref 70–99)
GLUCOSE SERPL-MCNC: 202 MG/DL (ref 74–109)
HCT VFR BLD AUTO: 37 % (ref 42–52)
HGB BLD-MCNC: 12.2 G/DL (ref 14–18)
PERFORMED ON: ABNORMAL
POTASSIUM SERPL-SCNC: 3.6 MMOL/L (ref 3.5–5)
SODIUM SERPL-SCNC: 135 MMOL/L (ref 136–145)

## 2024-02-27 PROCEDURE — 2580000003 HC RX 258: Performed by: ORTHOPAEDIC SURGERY

## 2024-02-27 PROCEDURE — 36415 COLL VENOUS BLD VENIPUNCTURE: CPT

## 2024-02-27 PROCEDURE — 85018 HEMOGLOBIN: CPT

## 2024-02-27 PROCEDURE — 85014 HEMATOCRIT: CPT

## 2024-02-27 PROCEDURE — 94760 N-INVAS EAR/PLS OXIMETRY 1: CPT

## 2024-02-27 PROCEDURE — G0378 HOSPITAL OBSERVATION PER HR: HCPCS

## 2024-02-27 PROCEDURE — 82962 GLUCOSE BLOOD TEST: CPT

## 2024-02-27 PROCEDURE — 97161 PT EVAL LOW COMPLEX 20 MIN: CPT

## 2024-02-27 PROCEDURE — 6360000002 HC RX W HCPCS: Performed by: ORTHOPAEDIC SURGERY

## 2024-02-27 PROCEDURE — 6370000000 HC RX 637 (ALT 250 FOR IP): Performed by: ORTHOPAEDIC SURGERY

## 2024-02-27 PROCEDURE — 80048 BASIC METABOLIC PNL TOTAL CA: CPT

## 2024-02-27 PROCEDURE — 97116 GAIT TRAINING THERAPY: CPT

## 2024-02-27 PROCEDURE — 97530 THERAPEUTIC ACTIVITIES: CPT

## 2024-02-27 RX ADMIN — ATENOLOL 50 MG: 25 TABLET ORAL at 20:16

## 2024-02-27 RX ADMIN — BISACODYL 5 MG: 5 TABLET, COATED ORAL at 09:31

## 2024-02-27 RX ADMIN — METFORMIN HYDROCHLORIDE 1000 MG: 500 TABLET ORAL at 16:07

## 2024-02-27 RX ADMIN — ASPIRIN 81 MG: 81 TABLET, CHEWABLE ORAL at 16:07

## 2024-02-27 RX ADMIN — ASPIRIN 81 MG: 81 TABLET, CHEWABLE ORAL at 09:31

## 2024-02-27 RX ADMIN — LEVOTHYROXINE SODIUM 50 MCG: 50 TABLET ORAL at 05:54

## 2024-02-27 RX ADMIN — PREGABALIN 50 MG: 50 CAPSULE ORAL at 09:31

## 2024-02-27 RX ADMIN — WATER 2000 MG: 1 INJECTION INTRAMUSCULAR; INTRAVENOUS; SUBCUTANEOUS at 16:08

## 2024-02-27 RX ADMIN — FINASTERIDE 5 MG: 5 TABLET, FILM COATED ORAL at 10:47

## 2024-02-27 RX ADMIN — TRAMADOL HYDROCHLORIDE 50 MG: 50 TABLET, COATED ORAL at 03:55

## 2024-02-27 RX ADMIN — ACETAMINOPHEN 650 MG: 325 TABLET ORAL at 09:31

## 2024-02-27 RX ADMIN — TRAMADOL HYDROCHLORIDE 50 MG: 50 TABLET, COATED ORAL at 09:31

## 2024-02-27 RX ADMIN — SODIUM CHLORIDE, PRESERVATIVE FREE 10 ML: 5 INJECTION INTRAVENOUS at 20:17

## 2024-02-27 RX ADMIN — CIPROFLOXACIN 400 MG: 400 INJECTION, SOLUTION INTRAVENOUS at 20:24

## 2024-02-27 RX ADMIN — TRAMADOL HYDROCHLORIDE 50 MG: 50 TABLET, COATED ORAL at 20:16

## 2024-02-27 RX ADMIN — WATER 2000 MG: 1 INJECTION INTRAMUSCULAR; INTRAVENOUS; SUBCUTANEOUS at 09:28

## 2024-02-27 RX ADMIN — METFORMIN HYDROCHLORIDE 1000 MG: 500 TABLET ORAL at 09:31

## 2024-02-27 RX ADMIN — ACETAMINOPHEN 650 MG: 325 TABLET ORAL at 20:16

## 2024-02-27 RX ADMIN — CIPROFLOXACIN 400 MG: 400 INJECTION, SOLUTION INTRAVENOUS at 09:31

## 2024-02-27 RX ADMIN — MELOXICAM 15 MG: 7.5 TABLET ORAL at 09:31

## 2024-02-27 RX ADMIN — ATORVASTATIN CALCIUM 20 MG: 20 TABLET, FILM COATED ORAL at 20:16

## 2024-02-27 RX ADMIN — SODIUM CHLORIDE: 9 INJECTION, SOLUTION INTRAVENOUS at 00:17

## 2024-02-27 RX ADMIN — WATER 2000 MG: 1 INJECTION INTRAMUSCULAR; INTRAVENOUS; SUBCUTANEOUS at 00:09

## 2024-02-27 RX ADMIN — TAMSULOSIN HYDROCHLORIDE 0.4 MG: 0.4 CAPSULE ORAL at 09:31

## 2024-02-27 RX ADMIN — ACETAMINOPHEN 650 MG: 325 TABLET ORAL at 03:55

## 2024-02-27 RX ADMIN — ACETAMINOPHEN 650 MG: 325 TABLET ORAL at 14:52

## 2024-02-27 RX ADMIN — TRAMADOL HYDROCHLORIDE 50 MG: 50 TABLET, COATED ORAL at 14:52

## 2024-02-27 RX ADMIN — INSULIN GLARGINE 22 UNITS: 100 INJECTION, SOLUTION SUBCUTANEOUS at 21:24

## 2024-02-27 RX ADMIN — OXYCODONE HYDROCHLORIDE 10 MG: 10 TABLET ORAL at 03:54

## 2024-02-27 RX ADMIN — Medication 1 TABLET: at 09:31

## 2024-02-27 RX ADMIN — PREGABALIN 50 MG: 50 CAPSULE ORAL at 20:16

## 2024-02-27 RX ADMIN — SODIUM CHLORIDE, PRESERVATIVE FREE 10 ML: 5 INJECTION INTRAVENOUS at 09:37

## 2024-02-27 ASSESSMENT — PAIN - FUNCTIONAL ASSESSMENT
PAIN_FUNCTIONAL_ASSESSMENT: PREVENTS OR INTERFERES SOME ACTIVE ACTIVITIES AND ADLS
PAIN_FUNCTIONAL_ASSESSMENT: PREVENTS OR INTERFERES SOME ACTIVE ACTIVITIES AND ADLS

## 2024-02-27 ASSESSMENT — PAIN DESCRIPTION - ORIENTATION
ORIENTATION: RIGHT
ORIENTATION: RIGHT

## 2024-02-27 ASSESSMENT — PAIN DESCRIPTION - LOCATION
LOCATION: KNEE
LOCATION: KNEE

## 2024-02-27 ASSESSMENT — PAIN DESCRIPTION - ONSET
ONSET: GRADUAL
ONSET: GRADUAL

## 2024-02-27 ASSESSMENT — PAIN DESCRIPTION - DESCRIPTORS
DESCRIPTORS: ACHING
DESCRIPTORS: DISCOMFORT

## 2024-02-27 ASSESSMENT — PAIN SCALES - GENERAL
PAINLEVEL_OUTOF10: 5
PAINLEVEL_OUTOF10: 4
PAINLEVEL_OUTOF10: 1
PAINLEVEL_OUTOF10: 1

## 2024-02-27 ASSESSMENT — PAIN DESCRIPTION - FREQUENCY
FREQUENCY: INTERMITTENT
FREQUENCY: INTERMITTENT

## 2024-02-27 ASSESSMENT — PAIN DESCRIPTION - PAIN TYPE
TYPE: SURGICAL PAIN
TYPE: SURGICAL PAIN

## 2024-02-27 NOTE — CARE COORDINATION
02/27/24 1043   IMM Letter   Observation Status Letter date given: 02/27/24   Observation Status Letter time given: 0955   Observation Status Letter given to Patient/Family/Significant other/Guardian/POA/by: humera BEAUCHAMP letter given to patient/family with oral explanation and questions addressed by:     Signed letter placed in pt chart.   Electronically signed by Humera Spaulding on 2/27/2024 at 10:43 AM

## 2024-02-27 NOTE — OP NOTE
irrigation.  We prepared for press-fit fixation.  We then press-fit the size F tibia followed by a size 11 narrow femoral component.  Repeat trial was done.  We irrigated with another liter of irrigation.  The final size 10 MC liner was locked into the tibial tray.    Final numbers showed 6 degrees of hyperextension, 0-degree alignment; in extension, 3.5 medial, 5.5 lateral; in flexion, 6 medial and 6.5 lateral.  Parapatellar approach was closed with interrupted #1 Vicryl suture, 2-0 Vicryl suture for subcutaneous tissues, and 3-0 Vicryl for subcuticular stitch.  Prineo, Dermabond, and soft dressings were applied.  The patient was taken to recovery room in stable condition.    POSTOPERATIVE PLANS:    1. The patient will be on a typical postop protocol.  2. He will be on 6 doses of Ancef and 2 days of Cipro.  3. We will keep him on aspirin 81 mg twice a day for 6 weeks for DVT prophylaxis, followed by him resuming his normal aspirin management after that.  Please note that prior to surgery, the patient walked with a cane in his left hand and range of motion was 25 to 95 degrees prior to surgery.  Please note we will emphasize extension for him postoperatively.          NOEL ORTIZ MD      D:  02/26/2024 18:12:12     T:  02/26/2024 23:58:46     KADEN/MONET  Job #:  [id number]     Doc#:  [id number]

## 2024-02-27 NOTE — CONSULTS
Consult Note      CHIEF COMPLAINT:  Right Knee Pain    Reason for Admission:  Right TKA    History Obtained From:  patient, chart    HISTORY OF PRESENT ILLNESS:      The patient is a 88 y.o. male who was admitted to Dr. Kumar's service and underwent a right TKA. His pain is controlled. He has had no c/o chest pain or SOA. He has had no abdominal pain or N/V. He is eating. He has had no HA or dizziness. He has had no recent illnesses or fevers.     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    Diabetes mellitus (HCC)     Hyperlipidemia     Hypertension     Indwelling catheter present on admission     Irregular heart rhythm     Thyroid disease      Past Surgical History:        Procedure Laterality Date    CARPAL TUNNEL RELEASE Bilateral     CATARACT EXTRACTION EXTRACAPSULAR W/ INTRAOCULAR LENS IMPLANTATION Bilateral     CORONARY ANGIOPLASTY WITH STENT PLACEMENT      x1    ENDOSCOPY, COLON, DIAGNOSTIC      HERNIA REPAIR      JOINT REPLACEMENT Bilateral     total hip replacement         Medications Prior to Admission:    Medications Prior to Admission: metFORMIN (GLUCOPHAGE) 1000 MG tablet, Take 1 tablet by mouth 2 times daily (with meals)  dulaglutide (TRULICITY) 1.5 MG/0.5ML SC injection, Inject 0.5 mLs into the skin once a week  insulin glargine (LANTUS) 100 UNIT/ML injection vial, Inject 22 Units into the skin nightly  glipiZIDE (GLUCOTROL) 10 MG tablet, Take 1 tablet by mouth 2 times daily as needed (hyperglycemia)  amLODIPine (NORVASC) 2.5 MG tablet, Take 1 tablet by mouth daily  atenolol (TENORMIN) 50 MG tablet, Take 1 tablet by mouth daily  atorvastatin (LIPITOR) 20 MG tablet, Take 1 tablet by mouth daily  levothyroxine (SYNTHROID) 50 MCG tablet, Take 1 tablet by mouth Daily  meloxicam (MOBIC) 15 MG tablet, Take 1 tablet by mouth daily  pregabalin (LYRICA) 50 MG capsule, Take 1 capsule by mouth 2 times daily.  tamsulosin (FLOMAX) 0.4 MG capsule, Take 1

## 2024-02-27 NOTE — ANESTHESIA POSTPROCEDURE EVALUATION
Department of Anesthesiology  Postprocedure Note    Patient: Tanmay Gilmore  MRN: [id number]  YOB: 1935  Date of evaluation: 2/27/2024    Procedure Summary       Date: 02/26/24 Room / Location: [address]    Anesthesia Start: 1451 Anesthesia Stop: 1730    Procedure: ROBOTIC COMPLEX RIGHT TOTAL KNEE ARTHROPLASTY (Right) Diagnosis:       Unilateral primary osteoarthritis, right knee      (Unilateral primary osteoarthritis, right knee [M17.11])    Surgeons: Chet Kumar MD Responsible Provider: Wilian Pickard APRN - CRNA    Anesthesia Type: regional, spinal ASA Status: 3            Anesthesia Type: No value filed.    Dariel Phase I: Dariel Score: 8    Dariel Phase II:      Anesthesia Post Evaluation    Patient location during evaluation: floor  Patient participation: complete - patient participated  Level of consciousness: awake and alert  Pain score: 3  Airway patency: patent  Nausea & Vomiting: no nausea and no vomiting  Cardiovascular status: hemodynamically stable  Respiratory status: acceptable  Hydration status: stable  Multimodal analgesia pain management approach  Pain management: adequate    No notable events documented.

## 2024-02-28 LAB
ANION GAP SERPL CALCULATED.3IONS-SCNC: 10 MMOL/L (ref 7–19)
BUN SERPL-MCNC: 17 MG/DL (ref 8–23)
CALCIUM SERPL-MCNC: 8.4 MG/DL (ref 8.8–10.2)
CHLORIDE SERPL-SCNC: 99 MMOL/L (ref 98–111)
CO2 SERPL-SCNC: 22 MMOL/L (ref 22–29)
CREAT SERPL-MCNC: 1 MG/DL (ref 0.5–1.2)
GLUCOSE BLD-MCNC: 151 MG/DL (ref 70–99)
GLUCOSE BLD-MCNC: 153 MG/DL (ref 70–99)
GLUCOSE BLD-MCNC: 173 MG/DL (ref 70–99)
GLUCOSE BLD-MCNC: 224 MG/DL (ref 70–99)
GLUCOSE SERPL-MCNC: 157 MG/DL (ref 74–109)
HCT VFR BLD AUTO: 34.6 % (ref 42–52)
HGB BLD-MCNC: 11.8 G/DL (ref 14–18)
PERFORMED ON: ABNORMAL
POTASSIUM SERPL-SCNC: 4.1 MMOL/L (ref 3.5–5)
SODIUM SERPL-SCNC: 131 MMOL/L (ref 136–145)

## 2024-02-28 PROCEDURE — 82962 GLUCOSE BLOOD TEST: CPT

## 2024-02-28 PROCEDURE — 85014 HEMATOCRIT: CPT

## 2024-02-28 PROCEDURE — G0378 HOSPITAL OBSERVATION PER HR: HCPCS

## 2024-02-28 PROCEDURE — 97530 THERAPEUTIC ACTIVITIES: CPT

## 2024-02-28 PROCEDURE — 85018 HEMOGLOBIN: CPT

## 2024-02-28 PROCEDURE — 6370000000 HC RX 637 (ALT 250 FOR IP): Performed by: ORTHOPAEDIC SURGERY

## 2024-02-28 PROCEDURE — 6360000002 HC RX W HCPCS: Performed by: ORTHOPAEDIC SURGERY

## 2024-02-28 PROCEDURE — 97110 THERAPEUTIC EXERCISES: CPT

## 2024-02-28 PROCEDURE — 97165 OT EVAL LOW COMPLEX 30 MIN: CPT

## 2024-02-28 PROCEDURE — 80048 BASIC METABOLIC PNL TOTAL CA: CPT

## 2024-02-28 PROCEDURE — 94760 N-INVAS EAR/PLS OXIMETRY 1: CPT

## 2024-02-28 PROCEDURE — 97535 SELF CARE MNGMENT TRAINING: CPT

## 2024-02-28 PROCEDURE — 2580000003 HC RX 258: Performed by: ORTHOPAEDIC SURGERY

## 2024-02-28 PROCEDURE — 97116 GAIT TRAINING THERAPY: CPT

## 2024-02-28 PROCEDURE — 36415 COLL VENOUS BLD VENIPUNCTURE: CPT

## 2024-02-28 RX ADMIN — INSULIN GLARGINE 22 UNITS: 100 INJECTION, SOLUTION SUBCUTANEOUS at 20:50

## 2024-02-28 RX ADMIN — ATORVASTATIN CALCIUM 20 MG: 20 TABLET, FILM COATED ORAL at 20:50

## 2024-02-28 RX ADMIN — Medication 1 TABLET: at 08:15

## 2024-02-28 RX ADMIN — ATENOLOL 50 MG: 25 TABLET ORAL at 20:51

## 2024-02-28 RX ADMIN — ACETAMINOPHEN 650 MG: 325 TABLET ORAL at 16:20

## 2024-02-28 RX ADMIN — LEVOTHYROXINE SODIUM 50 MCG: 50 TABLET ORAL at 05:54

## 2024-02-28 RX ADMIN — MELOXICAM 15 MG: 7.5 TABLET ORAL at 08:16

## 2024-02-28 RX ADMIN — CIPROFLOXACIN 400 MG: 400 INJECTION, SOLUTION INTRAVENOUS at 08:38

## 2024-02-28 RX ADMIN — TRAMADOL HYDROCHLORIDE 50 MG: 50 TABLET, COATED ORAL at 03:47

## 2024-02-28 RX ADMIN — BISACODYL 5 MG: 5 TABLET, COATED ORAL at 08:16

## 2024-02-28 RX ADMIN — WATER 2000 MG: 1 INJECTION INTRAMUSCULAR; INTRAVENOUS; SUBCUTANEOUS at 16:20

## 2024-02-28 RX ADMIN — ASPIRIN 81 MG: 81 TABLET, CHEWABLE ORAL at 08:15

## 2024-02-28 RX ADMIN — ACETAMINOPHEN 650 MG: 325 TABLET ORAL at 08:15

## 2024-02-28 RX ADMIN — METFORMIN HYDROCHLORIDE 1000 MG: 500 TABLET ORAL at 16:20

## 2024-02-28 RX ADMIN — WATER 2000 MG: 1 INJECTION INTRAMUSCULAR; INTRAVENOUS; SUBCUTANEOUS at 00:15

## 2024-02-28 RX ADMIN — TRAMADOL HYDROCHLORIDE 50 MG: 50 TABLET, COATED ORAL at 20:50

## 2024-02-28 RX ADMIN — WATER 2000 MG: 1 INJECTION INTRAMUSCULAR; INTRAVENOUS; SUBCUTANEOUS at 08:19

## 2024-02-28 RX ADMIN — PREGABALIN 50 MG: 50 CAPSULE ORAL at 20:50

## 2024-02-28 RX ADMIN — ASPIRIN 81 MG: 81 TABLET, CHEWABLE ORAL at 16:20

## 2024-02-28 RX ADMIN — SODIUM CHLORIDE, PRESERVATIVE FREE 10 ML: 5 INJECTION INTRAVENOUS at 20:51

## 2024-02-28 RX ADMIN — TRAMADOL HYDROCHLORIDE 50 MG: 50 TABLET, COATED ORAL at 08:15

## 2024-02-28 RX ADMIN — PREGABALIN 50 MG: 50 CAPSULE ORAL at 08:15

## 2024-02-28 RX ADMIN — TAMSULOSIN HYDROCHLORIDE 0.4 MG: 0.4 CAPSULE ORAL at 08:15

## 2024-02-28 RX ADMIN — ACETAMINOPHEN 650 MG: 325 TABLET ORAL at 20:51

## 2024-02-28 RX ADMIN — FINASTERIDE 5 MG: 5 TABLET, FILM COATED ORAL at 08:15

## 2024-02-28 RX ADMIN — METFORMIN HYDROCHLORIDE 1000 MG: 500 TABLET ORAL at 08:15

## 2024-02-28 RX ADMIN — TRAMADOL HYDROCHLORIDE 50 MG: 50 TABLET, COATED ORAL at 16:20

## 2024-02-28 RX ADMIN — POLYETHYLENE GLYCOL 3350 17 G: 17 POWDER, FOR SOLUTION ORAL at 08:16

## 2024-02-28 RX ADMIN — ACETAMINOPHEN 650 MG: 325 TABLET ORAL at 03:47

## 2024-02-28 ASSESSMENT — PAIN DESCRIPTION - ORIENTATION
ORIENTATION: RIGHT

## 2024-02-28 ASSESSMENT — PAIN SCALES - GENERAL
PAINLEVEL_OUTOF10: 4
PAINLEVEL_OUTOF10: 1
PAINLEVEL_OUTOF10: 0
PAINLEVEL_OUTOF10: 2
PAINLEVEL_OUTOF10: 2
PAINLEVEL_OUTOF10: 1

## 2024-02-28 ASSESSMENT — PAIN DESCRIPTION - PAIN TYPE
TYPE: SURGICAL PAIN
TYPE: SURGICAL PAIN

## 2024-02-28 ASSESSMENT — PAIN DESCRIPTION - LOCATION
LOCATION: KNEE

## 2024-02-28 ASSESSMENT — PAIN DESCRIPTION - FREQUENCY
FREQUENCY: CONTINUOUS
FREQUENCY: INTERMITTENT

## 2024-02-28 ASSESSMENT — PAIN DESCRIPTION - ONSET
ONSET: ON-GOING
ONSET: GRADUAL

## 2024-02-28 ASSESSMENT — PAIN DESCRIPTION - DESCRIPTORS
DESCRIPTORS: ACHING;DISCOMFORT
DESCRIPTORS: ACHING

## 2024-02-29 ENCOUNTER — READMISSION MANAGEMENT (OUTPATIENT)
Dept: CALL CENTER | Facility: HOSPITAL | Age: 89
End: 2024-02-29
Payer: MEDICARE

## 2024-02-29 VITALS
HEART RATE: 65 BPM | SYSTOLIC BLOOD PRESSURE: 133 MMHG | OXYGEN SATURATION: 98 % | WEIGHT: 234 LBS | DIASTOLIC BLOOD PRESSURE: 79 MMHG | BODY MASS INDEX: 30.03 KG/M2 | HEIGHT: 74 IN | RESPIRATION RATE: 18 BRPM | TEMPERATURE: 97.3 F

## 2024-02-29 LAB
ANION GAP SERPL CALCULATED.3IONS-SCNC: 9 MMOL/L (ref 7–19)
BUN SERPL-MCNC: 18 MG/DL (ref 8–23)
CALCIUM SERPL-MCNC: 8.7 MG/DL (ref 8.8–10.2)
CHLORIDE SERPL-SCNC: 98 MMOL/L (ref 98–111)
CO2 SERPL-SCNC: 25 MMOL/L (ref 22–29)
CREAT SERPL-MCNC: 0.9 MG/DL (ref 0.5–1.2)
GLUCOSE BLD-MCNC: 253 MG/DL (ref 70–99)
GLUCOSE BLD-MCNC: 267 MG/DL (ref 70–99)
GLUCOSE SERPL-MCNC: 165 MG/DL (ref 74–109)
HCT VFR BLD AUTO: 33.9 % (ref 42–52)
HGB BLD-MCNC: 11.8 G/DL (ref 14–18)
PERFORMED ON: ABNORMAL
PERFORMED ON: ABNORMAL
POTASSIUM SERPL-SCNC: 4.5 MMOL/L (ref 3.5–5)
SODIUM SERPL-SCNC: 132 MMOL/L (ref 136–145)

## 2024-02-29 PROCEDURE — 6370000000 HC RX 637 (ALT 250 FOR IP): Performed by: ORTHOPAEDIC SURGERY

## 2024-02-29 PROCEDURE — 80048 BASIC METABOLIC PNL TOTAL CA: CPT

## 2024-02-29 PROCEDURE — 97530 THERAPEUTIC ACTIVITIES: CPT

## 2024-02-29 PROCEDURE — 82962 GLUCOSE BLOOD TEST: CPT

## 2024-02-29 PROCEDURE — 97116 GAIT TRAINING THERAPY: CPT

## 2024-02-29 PROCEDURE — 2580000003 HC RX 258: Performed by: ORTHOPAEDIC SURGERY

## 2024-02-29 PROCEDURE — 36415 COLL VENOUS BLD VENIPUNCTURE: CPT

## 2024-02-29 PROCEDURE — 85018 HEMOGLOBIN: CPT

## 2024-02-29 PROCEDURE — 1200000000 HC SEMI PRIVATE

## 2024-02-29 PROCEDURE — 85014 HEMATOCRIT: CPT

## 2024-02-29 PROCEDURE — G0378 HOSPITAL OBSERVATION PER HR: HCPCS

## 2024-02-29 PROCEDURE — 94760 N-INVAS EAR/PLS OXIMETRY 1: CPT

## 2024-02-29 RX ORDER — ASPIRIN 81 MG/1
81 TABLET, CHEWABLE ORAL 2 TIMES DAILY
Qty: 84 TABLET | Refills: 0 | Status: SHIPPED | OUTPATIENT
Start: 2024-02-29

## 2024-02-29 RX ORDER — ONDANSETRON 4 MG/1
4 TABLET, FILM COATED ORAL EVERY 8 HOURS PRN
Qty: 30 TABLET | Refills: 0 | Status: SHIPPED | OUTPATIENT
Start: 2024-02-29

## 2024-02-29 RX ORDER — TRAMADOL HYDROCHLORIDE 50 MG/1
50 TABLET ORAL EVERY 4 HOURS PRN
Qty: 70 TABLET | Refills: 0 | Status: SHIPPED | OUTPATIENT
Start: 2024-02-29 | End: 2024-03-14

## 2024-02-29 RX ADMIN — PREGABALIN 50 MG: 50 CAPSULE ORAL at 08:16

## 2024-02-29 RX ADMIN — TRAMADOL HYDROCHLORIDE 50 MG: 50 TABLET, COATED ORAL at 08:13

## 2024-02-29 RX ADMIN — Medication 1 TABLET: at 08:15

## 2024-02-29 RX ADMIN — MELOXICAM 15 MG: 7.5 TABLET ORAL at 08:14

## 2024-02-29 RX ADMIN — FINASTERIDE 5 MG: 5 TABLET, FILM COATED ORAL at 08:14

## 2024-02-29 RX ADMIN — SODIUM CHLORIDE, PRESERVATIVE FREE 10 ML: 5 INJECTION INTRAVENOUS at 08:15

## 2024-02-29 RX ADMIN — ASPIRIN 81 MG: 81 TABLET, CHEWABLE ORAL at 08:15

## 2024-02-29 RX ADMIN — ACETAMINOPHEN 650 MG: 325 TABLET ORAL at 04:00

## 2024-02-29 RX ADMIN — METFORMIN HYDROCHLORIDE 1000 MG: 500 TABLET ORAL at 08:13

## 2024-02-29 RX ADMIN — TAMSULOSIN HYDROCHLORIDE 0.4 MG: 0.4 CAPSULE ORAL at 08:14

## 2024-02-29 RX ADMIN — ACETAMINOPHEN 650 MG: 325 TABLET ORAL at 08:13

## 2024-02-29 RX ADMIN — LEVOTHYROXINE SODIUM 50 MCG: 50 TABLET ORAL at 06:32

## 2024-02-29 RX ADMIN — TRAMADOL HYDROCHLORIDE 50 MG: 50 TABLET, COATED ORAL at 04:00

## 2024-02-29 ASSESSMENT — PAIN DESCRIPTION - ORIENTATION
ORIENTATION: LEFT
ORIENTATION: LEFT

## 2024-02-29 ASSESSMENT — PAIN DESCRIPTION - DESCRIPTORS
DESCRIPTORS: DISCOMFORT
DESCRIPTORS: DISCOMFORT

## 2024-02-29 ASSESSMENT — PAIN DESCRIPTION - LOCATION
LOCATION: KNEE
LOCATION: KNEE

## 2024-02-29 ASSESSMENT — PAIN DESCRIPTION - PAIN TYPE: TYPE: SURGICAL PAIN

## 2024-02-29 ASSESSMENT — PAIN SCALES - GENERAL
PAINLEVEL_OUTOF10: 0
PAINLEVEL_OUTOF10: 6
PAINLEVEL_OUTOF10: 3

## 2024-02-29 ASSESSMENT — PAIN - FUNCTIONAL ASSESSMENT: PAIN_FUNCTIONAL_ASSESSMENT: PREVENTS OR INTERFERES SOME ACTIVE ACTIVITIES AND ADLS

## 2024-02-29 ASSESSMENT — PAIN DESCRIPTION - FREQUENCY: FREQUENCY: CONTINUOUS

## 2024-02-29 NOTE — DISCHARGE SUMMARY
(ROXICODONE) immediate release tablet 5 mg, 5 mg, Oral, Q4H PRN **OR** oxyCODONE HCl (OXY-IR) immediate release tablet 10 mg, 10 mg, Oral, Q4H PRN **OR** oxyCODONE (ROXICODONE) immediate release tablet 15 mg, 15 mg, Oral, Q4H PRN  HYDROmorphone HCl PF (DILAUDID) injection 0.25 mg, 0.25 mg, IntraVENous, Q3H PRN **OR** HYDROmorphone HCl PF (DILAUDID) injection 0.5 mg, 0.5 mg, IntraVENous, Q3H PRN **OR** HYDROmorphone HCl PF (DILAUDID) injection 1 mg, 1 mg, IntraVENous, Q3H PRN  traMADol (ULTRAM) tablet 50 mg, 50 mg, Oral, Q6H  glucose chewable tablet 16 g, 4 tablet, Oral, PRN  dextrose bolus 10% 125 mL, 125 mL, IntraVENous, PRN **OR** dextrose bolus 10% 250 mL, 250 mL, IntraVENous, PRN  glucagon injection 1 mg, 1 mg, SubCUTAneous, PRN  dextrose 10 % infusion, , IntraVENous, Continuous PRN  oyster shell calcium w/D 500-5 MG-MCG tablet 1 tablet, 1 tablet, Oral, Daily    Post-operatively the patient’s diet was advanced as tolerated and their incision was checked on POD #1.  The incision is dressing in place, clean, dry, and intact with no signs of infection.  The patient remained neurovascularly intact in the lower extremity and had intact pulses distally.  Patient’s calf remained soft and showed no evidence of DVT.  The patient was able to move their knee without any problems post-operatively.  Physical therapy and occupational therapy were consulted and began working with the patient post-operatively.  The patient progressed with PT/OT as would be expected and continued to improve through their stay.  The patients pain was initially controlled with IV medications but we were able to transition to oral pain medications soon after arrival to the floor and their pain remained under good control through their hospital stay.  From a medical standpoint the patient remained stable and continued to have the medicine team follow throughout their stay.  The patients dressing was changed/incison was checked on day of d/c.  The

## 2024-02-29 NOTE — CARE COORDINATION
02/29/24 1048   IMM Letter   IMM Letter given to Patient/Family/Significant other/Guardian/POA/by: humera mota sw   IMM Letter date given: 02/29/24   IMM Letter time given: 0939     First IMM given and explained to patient.  All questions answered.  Patient upgraded from observation to inpatient.  Signed copy placed in patient soft chart.   Electronically signed by Humera Mota on 2/29/2024 at 10:49 AM

## 2024-03-01 ENCOUNTER — TELEPHONE (OUTPATIENT)
Dept: INPATIENT UNIT | Age: 89
End: 2024-03-01

## 2024-03-01 ENCOUNTER — TRANSITIONAL CARE MANAGEMENT TELEPHONE ENCOUNTER (OUTPATIENT)
Dept: CALL CENTER | Facility: HOSPITAL | Age: 89
End: 2024-03-01
Payer: MEDICARE

## 2024-03-01 NOTE — PROGRESS NOTES
Occupational Therapy Initial Assessment  Date: 2024   Patient Name: Tanmay Gilmore  MRN: [id number]     : 1935    Date of Service: 2024    Discharge Recommendations:  24 hour supervision or assist       Assessment   Assessment: Evaluation and single-tx completed this date. All education and training completed regarding ADLs. OT anticipates no overt barriers to stated discharge plan, including DME/AE and support resources.  No Skilled OT: Safe to return home;No OT goals identified  REQUIRES OT FOLLOW-UP: No  Activity Tolerance  Activity Tolerance: Patient Tolerated treatment well              Patient Diagnosis(es): There were no encounter diagnoses.    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    Diabetes mellitus (HCC)     Hyperlipidemia     Hypertension     Indwelling catheter present on admission     Irregular heart rhythm     Thyroid disease         Past Surgical History:   Past Surgical History:   Procedure Laterality Date    CARPAL TUNNEL RELEASE Bilateral     CATARACT EXTRACTION EXTRACAPSULAR W/ INTRAOCULAR LENS IMPLANTATION Bilateral     CORONARY ANGIOPLASTY WITH STENT PLACEMENT      x1    ENDOSCOPY, COLON, DIAGNOSTIC      HERNIA REPAIR      JOINT REPLACEMENT Bilateral     total hip replacement    TOTAL KNEE ARTHROPLASTY Right 2024    ROBOTIC COMPLEX RIGHT TOTAL KNEE ARTHROPLASTY performed by Chet Kumar MD at [facility] OR              Restrictions  Restrictions/Precautions  Restrictions/Precautions: Weight Bearing  Lower Extremity Weight Bearing Restrictions  Right Lower Extremity Weight Bearing: Weight Bearing As Tolerated    Subjective      Pain Assessment  Pain Assessment: 0-10  Pain Level: 2  Patient's Stated Pain Goal: 0 - No pain  Pain Location: Knee  Pain Orientation: Right  Pain Descriptors: Aching  Functional Pain Assessment: Prevents or interferes some active activities and ADLs  Pain Type: Surgical 
24 hr orders verified. Electronically signed by Geno Fisher RN on 2/28/2024 at 1:07 AM    
4 Eyes Skin Assessment     NAME:  Tanmay Gilmore  YOB: 1935  MEDICAL RECORD NUMBER:  [id number]    The patient is being assessed for  Admission    I agree that at least one RN has performed a thorough Head to Toe Skin Assessment on the patient. ALL assessment sites listed below have been assessed.      Areas assessed by both nurses:    Head, Face, Ears, Shoulders, Back, Chest, Arms, Elbows, Hands, Sacrum. Buttock, Coccyx, Ischium, and Legs. Feet and Heels        Does the Patient have a Wound? No noted wound(s)       Juancarlos Prevention initiated by RN: Yes  Wound Care Orders initiated by RN: No    Pressure Injury (Stage 3,4, Unstageable, DTI, NWPT, and Complex wounds) if present, place Wound referral order by RN under : No    New Ostomies, if present place, Ostomy referral order under : No     Nurse 1 eSignature: Electronically signed by Geno Fisher RN on 2/27/24 at 12:28 AM CST    **SHARE this note so that the co-signing nurse can place an eSignature**    Nurse 2 eSignature: Electronically signed by Elodia Weaver RN on 2/27/24 at 12:29 AM CST   
CLINICAL PHARMACY NOTE: MEDS TO BEDS    Total # of Prescriptions Filled: 3   The following medications were delivered to the patient:  Current Discharge Medication List        START taking these medications    Details   ondansetron (ZOFRAN) 4 MG tablet Take 1 tablet by mouth every 8 hours as needed for Nausea or Vomiting  Qty: 30 tablet, Refills: 0      traMADol (ULTRAM) 50 MG tablet Take 1 tablet by mouth every 4 hours as needed for Pain for up to 70 doses. Max Daily Amount: 300 mg  Qty: 70 tablet, Refills: 0    Comments: Reduce doses taken as pain becomes manageable  Associated Diagnoses: Primary osteoarthritis of right knee           Aspirin 81 mg chewable    Additional Documentation:    Handed scripts to patients wife and patient was alert as well sitting in chair. Paid with cash.   
Orders verified. Electronically signed by Geno Fisher RN on 2/27/2024 at 1:47 AM    
Ordrs verified. Electronically signed by Geno Fisher RN on 2/29/2024 at 12:41 AM    
PHARMACY NOTE:  Tanmay Gilmore was ordered Cinnamon.   As per the [facility] Formulary Committee policy, herbals and certain dietary supplements will be discontinued.  The herbal or dietary supplement may be continued after discharge from the hospital.    Electronically signed by Renay Zeng RPH on 2/26/2024 at 7:02 PM    
PIV removed.   Transport and cart ordered-awaiting transport.  
Patient discharged home today with [facility].  Went over all discharge instructions and new medications with patient and provided a copy of all new medications to take home.  Patient verbalized understanding.  Patient was stable upon discharge.  Electronically signed by Lydia Albarado RN on 2/29/2024 at 10:18 AM    
Patient discharged home today with [facility].  Facility notified of patient's discharge and all documents were faxed.  P ;  F .  Electronically signed by Lydia Albarado RN on 2/29/2024 at 10:18 AM    
Physical Therapy    Name: Tanmay Gilmore  MRN: [id number]  Date of service: 2/28/2024 02/28/24 1300   Restrictions/Precautions   Restrictions/Precautions Weight Bearing   Lower Extremity Weight Bearing Restrictions   Right Lower Extremity Weight Bearing Weight Bearing As Tolerated   General   Diagnosis RTKR   General Comment   Comments pt in chair   Subjective   Subjective wants to walk and get back to bed   Subjective   Subjective no c/o pain   Transfers   Sit to Stand Minimal Assistance   Stand to Sit Contact guard assistance   Ambulation   WB Status WBAT   Ambulation   Surface Level tile   Device Rolling Walker   Assistance Contact guard assistance   Distance 60ft   Short Term Goals   Time Frame for Short Term Goals 2 WKS   Short Term Goal 1  FT WITH RW, CGA   Short Term Goal 2 STEPS, CGA   Activity Tolerance   Activity Tolerance Patient tolerated treatment well   Assessment   Assessment Pt wanted to ambulate before getting back to bed. Completed transfer min A. Difficulty getting upright then straightening LE(pt wanted to straighten knee fully while forward flexed making it difficult to move hands to RW). Ambulated in room 60ft CGA, and back to bed.   Discharge Recommendations Continue to assess pending progress   Physical Therapy Plan   General Plan 5-7 times per week   Therapy Duration 2 Weeks   Current Treatment Recommendations Strengthening;Functional mobility training;Transfer training;Gait training;Pain management;Safety education & training;Therapeutic activities;Positioning   PT Plan of Care   Wednesday X   Safety Devices   Type of Devices Left in bed;Bed alarm in place;Call light within reach   PT Whiteboard Notes   Therapy Whiteboard R TKR, WBAT   Recommendation   Requires PT Follow-Up Yes           Electronically signed by Julio Cesar Yoon PTA on 2/28/2024 at 1:33 PM     
Physical Therapy    Name: Tanmay Gilmore  MRN: [id number]  Date of service: 2/28/2024 02/28/24 1020   Restrictions/Precautions   Restrictions/Precautions Weight Bearing   Lower Extremity Weight Bearing Restrictions   Right Lower Extremity Weight Bearing Weight Bearing As Tolerated   General   Diagnosis RTKR   General Comment   Comments pt in chair   Subjective   Subjective agreed to exercises since just got in recliner   PT Exercises   Exercise Treatment Sitting in recliner: ankle pumps, LAQ, and marching x 15.  (Able to do bilateral, just small range on R side)   Short Term Goals   Time Frame for Short Term Goals 2 WKS   Short Term Goal 1  FT WITH RW, CGA   Short Term Goal 2 STEPS, CGA   Activity Tolerance   Activity Tolerance Patient tolerated treatment well   Assessment   Assessment Pt agreed to exercise in recliner. Pt completed some exercises bilateral with small range on R side. Pt was supprised to see range on R knee and stated he was glad we did exercises. Pt in recliner with ice pack and all needs within reach.   Physical Therapy Plan   General Plan 5-7 times per week   Therapy Duration 2 Weeks   Current Treatment Recommendations Strengthening;Functional mobility training;Transfer training;Gait training;Pain management;Safety education & training;Therapeutic activities;Positioning   PT Plan of Care   Wednesday X   Safety Devices   Type of Devices Left in chair;Call light within reach   PT Whiteboard Notes   Therapy Whiteboard R TKR, WBAT   Recommendation   Requires PT Follow-Up Yes           Electronically signed by Julio Cesar Yoon PTA on 2/28/2024 at 10:30 AM     
Physical Therapy  Facility/Department: [facility] SURG SERVICES  Physical Therapy Initial Assessment    Name: Tanmay Gilmore  : 1935  MRN: [id number]  Date of Service: 2024    Discharge Recommendations:  Continue to assess pending progress, Patient would benefit from continued therapy after discharge (Pt MAY REQUIRE SHORT TERM REHAB PRIOR TO DC HOME BASED ON HIS PROGRESS WITH PT)          Patient Diagnosis(es): There were no encounter diagnoses.  Past Medical History:  has a past medical history of Arthritis, Cancer (HCC), Diabetes mellitus (HCC), Hyperlipidemia, Hypertension, Indwelling catheter present on admission, Irregular heart rhythm, and Thyroid disease.  Past Surgical History:  has a past surgical history that includes Coronary angioplasty with stent; joint replacement (Bilateral); hernia repair; Carpal tunnel release (Bilateral); Cataract extraction, extracapsular w/ intraocular lens implant (Bilateral); and Endoscopy, colon, diagnostic.    Assessment   Body Structures, Functions, Activity Limitations Requiring Skilled Therapeutic Intervention: Decreased functional mobility ;Decreased ROM;Decreased strength;Decreased endurance;Increased pain  Assessment: pt WOULD BENEFIT FROM SKILLED PT IN THIS SETTING FOR POST OP TKR PROTOCOL  Therapy Prognosis: Good  Decision Making: Low Complexity  Requires PT Follow-Up: Yes  Activity Tolerance  Activity Tolerance: Patient tolerated treatment well     Plan   Physical Therapy Plan  General Plan: 5-7 times per week  Therapy Duration: 2 Weeks  Current Treatment Recommendations: Strengthening, Functional mobility training, Transfer training, Gait training, Pain management, Safety education & training, Therapeutic activities, Positioning  Additional Comments: MAY NEED ASSIST OF TWO TO PROGRESS AMBULATION INITIALLY  Safety Devices  Type of Devices: Call light within reach, Gait belt, Left in chair, Nurse notified 
Physical Therapy  Name: Tanmay Gilmore  MRN:  [id number]  Date of service:  2/27/2024 02/27/24 1524   Restrictions/Precautions   Restrictions/Precautions Weight Bearing   Lower Extremity Weight Bearing Restrictions   Right Lower Extremity Weight Bearing Weight Bearing As Tolerated   Subjective   Subjective I want to try   Oxygen Therapy   O2 Device None (Room air)   Bed Mobility   Sit to Supine Contact guard assistance   Transfers   Sit to Stand Minimal Assistance;Contact guard assistance   Stand to Sit Contact guard assistance   Ambulation   Surface Level tile   Device Rolling Walker   Assistance Contact guard assistance   Quality of Gait verbal cues to straighten leg when weight bearing.   Exercises   Hip Flexion 10   Knee Long Arc Quad 10   Ankle Pumps 10   Short Term Goals   Time Frame for Short Term Goals 2 WKS   Short Term Goal 1  FT WITH RW, CGA   Short Term Goal 2 STEPS, CGA   Conditions Requiring Skilled Therapeutic Intervention   Body Structures, Functions, Activity Limitations Requiring Skilled Therapeutic Intervention Decreased functional mobility ;Decreased ROM;Decreased strength;Decreased endurance;Increased pain   Assessment patient did good with short ambulation in room.  Verbal cues to keep right leg in walker when weight bearing.   Discharge Recommendations Continue to assess pending progress   Activity Tolerance   Activity Tolerance Patient tolerated treatment well   Physical Therapy Plan   General Plan 5-7 times per week   Therapy Duration 2 Weeks   Current Treatment Recommendations Strengthening;Functional mobility training;Transfer training;Gait training;Pain management;Safety education & training;Therapeutic activities;Positioning   PT Plan of Care   Tuesday X   Safety Devices   Type of Devices Bed alarm in place;Call light within reach;Left in bed  (wife in room)         Electronically signed by Tahmina Connor PTA on 2/27/2024 at 3:27 PM    
Progress Note  Tanmay Gilmore  2/28/2024 8:43 PM  Subjective:   Admit Date:   2/26/2024      CC/ADMIT DX:       Interval History:   Reviewed overnight events and nursing notes. He has no c/o chest pain or SOA. He has had no GI issues.    I have reviewed all labs/diagnostics from the last 24hrs.       ROS:   I have done a 10 point ROS and all are negative, except what is mentioned in the HPI.    ADULT DIET; Regular; 5 carb choices (75 gm/meal)    Medications:      sodium chloride 150 mL/hr at 02/27/24 0017    sodium chloride      dextrose        metFORMIN  1,000 mg Oral BID WC    [START ON 2/29/2024] dulaglutide  1.5 mg SubCUTAneous Weekly    insulin glargine  22 Units SubCUTAneous Nightly    atenolol  50 mg Oral Nightly    atorvastatin  20 mg Oral Nightly    levothyroxine  50 mcg Oral Daily    meloxicam  15 mg Oral Daily    pregabalin  50 mg Oral BID    tamsulosin  0.4 mg Oral Daily    aspirin  81 mg Oral BID    polyethylene glycol  17 g Oral Daily    finasteride  5 mg Oral Daily    sodium chloride flush  5-40 mL IntraVENous 2 times per day    acetaminophen  650 mg Oral Q6H    bisacodyl  5 mg Oral Daily    traMADol  50 mg Oral Q6H    oyster shell calcium w/D  1 tablet Oral Daily           Objective:   Vitals: BP (!) 146/78   Pulse 80   Temp 97.8 °F (36.6 °C) (Temporal)   Resp 16   Ht 1.88 m (6' 2\")   Wt 106.1 kg (234 lb)   SpO2 97%   BMI 30.04 kg/m²    Intake/Output Summary (Last 24 hours) at 2/28/2024 2043  Last data filed at 2/28/2024 1721  Gross per 24 hour   Intake --   Output 2050 ml   Net -2050 ml     General appearance: alert and cooperative with exam  Lungs: clear to auscultation bilaterally  Heart: RRR  Abdomen: soft, non-tender; bowel sounds normal; no masses,  no organomegaly  Extremities: extremities normal, atraumatic, no cyanosis or edema  Neurologic:  No obvious focal neurologic deficits.    Assessment and Plan:   Principal Problem:    Primary osteoarthritis of right knee  Active Problems:    
Progress Note  Tanmay Gilmore  2/29/2024 10:48 PM  Subjective:   Admit Date:   2/26/2024      CC/ADMIT DX:       Interval History:   Reviewed overnight events and nursing notes. He denies any abdominal pain or N/V. He has had no CP or SOA.     I have reviewed all labs/diagnostics from the last 24hrs.       ROS:   I have done a 10 point ROS and all are negative, except what is mentioned in the HPI.    No diet orders on file    Medications:                   Objective:   Vitals: /79   Pulse 65   Temp 97.3 °F (36.3 °C)   Resp 18   Ht 1.88 m (6' 2\")   Wt 106.1 kg (234 lb)   SpO2 98%   BMI 30.04 kg/m²    Intake/Output Summary (Last 24 hours) at 2/29/2024 2248  Last data filed at 2/29/2024 1228  Gross per 24 hour   Intake --   Output 550 ml   Net -550 ml     General appearance: alert and cooperative with exam  Lungs: clear to auscultation bilaterally  Heart: RRR  Abdomen: soft, non-tender; bowel sounds normal; no masses,  no organomegaly  Extremities: extremities normal, atraumatic, no cyanosis or edema  Neurologic:  No obvious focal neurologic deficits.    Assessment and Plan:   Principal Problem:    Primary osteoarthritis of right knee  Active Problems:    Arthritis of knee  Resolved Problems:    * No resolved hospital problems. *    ABL anemia    DM2    Plan:   Continue present medication(s)    Follow with Ortho   Medically stable for d/c home with Family, and Home Health      Discharge planning:   home     Reviewed treatment plans with the patient and/or family.             Electronically signed by Benton Tracey MD on 2/29/2024 at 10:48 PM   
Subjective:     Post-Operative Day: 1 Status Post right tka  Systemic or Specific Complaints:No Complaints  no nausea Pain 5    Objective:     Patient Vitals for the past 24 hrs:   BP Temp Temp src Pulse Resp SpO2 Height Weight   02/27/24 0424 -- -- -- -- 17 -- -- --   02/27/24 0327 118/74 97.2 °F (36.2 °C) Temporal 67 18 95 % -- --   02/27/24 0020 (!) 141/71 (!) 96.6 °F (35.9 °C) Temporal 63 18 93 % -- --   02/26/24 2224 131/74 97 °F (36.1 °C) Temporal 60 18 92 % -- --   02/26/24 2151 -- -- -- 60 -- -- -- --   02/26/24 2055 -- -- -- -- -- 97 % -- --   02/26/24 2024 128/72 (!) 96.3 °F (35.7 °C) Temporal 54 18 97 % -- --   02/26/24 1932 -- 97.5 °F (36.4 °C) Temporal -- -- -- -- --   02/26/24 1925 107/70 (!) 96.3 °F (35.7 °C) Temporal 59 16 97 % -- --   02/26/24 1813 119/67 (!) 96.6 °F (35.9 °C) Temporal 52 16 94 % -- --   02/26/24 1805 -- -- -- 53 13 96 % -- --   02/26/24 1800 118/72 -- -- 56 19 95 % -- --   02/26/24 1755 128/62 97.2 °F (36.2 °C) Temporal 56 17 95 % -- --   02/26/24 1750 124/69 -- -- 56 15 96 % -- --   02/26/24 1745 116/67 -- -- 53 13 95 % -- --   02/26/24 1740 119/75 -- -- 58 18 95 % -- --   02/26/24 1735 (!) 108/58 -- -- 68 19 90 % -- --   02/26/24 1730 (!) 105/59 97.6 °F (36.4 °C) -- 53 14 92 % -- --   02/26/24 1727 (!) 105/59 97.6 °F (36.4 °C) Temporal 70 16 92 % -- --   02/26/24 1450 -- -- -- 62 18 95 % -- --   02/26/24 1445 117/70 -- -- 61 18 93 % -- --   02/26/24 1440 -- -- -- 59 25 94 % -- --   02/26/24 1435 -- -- -- 57 17 94 % -- --   02/26/24 1430 119/68 -- -- 60 21 94 % -- --   02/26/24 1425 97/73 -- -- 58 18 95 % -- --   02/26/24 1420 125/72 -- -- 58 20 93 % -- --   02/26/24 1415 (!) 152/83 -- -- 61 22 96 % -- --   02/26/24 1410 133/74 -- -- 59 17 94 % -- --   02/26/24 1405 132/75 -- -- 59 17 93 % -- --   02/26/24 1400 139/79 -- -- 60 20 94 % -- --   02/26/24 1223 (!) 166/85 97.3 °F (36.3 °C) Tympanic 62 16 99 % 1.88 m (6' 2\") 106.1 kg (234 lb)       General: alert, appears stated age, 
Subjective:     Post-Operative Day: 2 Status Post right complex primary TKA.  Pt is awake and alert. Ox3.  He is having more pain today than yesterday. No other issues.  Systemic or Specific Complaints: No Complaints  no nausea Pain 5    Objective:     Patient Vitals for the past 24 hrs:   BP Temp Temp src Pulse Resp SpO2   02/28/24 0845 -- -- -- -- 16 --   02/28/24 0815 (!) 112/56 97.5 °F (36.4 °C) Temporal 64 16 95 %   02/28/24 0343 119/66 97.2 °F (36.2 °C) Temporal 68 18 94 %   02/28/24 0012 125/66 97.5 °F (36.4 °C) Temporal 76 18 93 %   02/27/24 2046 -- -- -- -- 17 --   02/27/24 2013 131/80 98.4 °F (36.9 °C) Temporal 87 17 95 %   02/27/24 1537 105/60 98.2 °F (36.8 °C) -- 69 18 95 %       General: alert, appears stated age, and cooperative   Exam: Incision clean, dry, and intact, no evidence of infection. Fair active motion to the right lower extremity   Neurovascular: Exam normal       Data Review:  Recent Labs     02/27/24  0121 02/28/24 0212   HGB 12.2* 11.8*     Recent Labs     02/28/24 0212   *   K 4.1   CREATININE 1.0     Recent Labs     02/28/24 0212   LABGLOM >60           Assessment:     Status Post right tka.      Plan:     Continue current post-op course.  Pt will need another day or two with PT.  He is not safe for home today.      Electronically signed by Alexy Newton PA-C on 2/28/2024 at 11:53 AM        
Subjective:     Post-Operative Day: 3  Status Post right complex primary TKA.  Pt is awake and alert. Ox3.  He is doing much better today than yesterday. No other issues.  Systemic or Specific Complaints: No Complaints  no nausea Pain 5    Objective:     Patient Vitals for the past 24 hrs:   BP Temp Temp src Pulse Resp SpO2   02/29/24 0641 -- -- -- -- -- 92 %   02/29/24 0423 (!) 143/83 97.5 °F (36.4 °C) Temporal 67 16 94 %   02/29/24 0019 (!) 155/72 97.7 °F (36.5 °C) Temporal 64 16 --   02/28/24 1929 (!) 146/78 97.8 °F (36.6 °C) Temporal 80 16 97 %   02/28/24 1620 (!) 145/74 97.6 °F (36.4 °C) Temporal 71 18 97 %   02/28/24 1231 114/62 97.3 °F (36.3 °C) Temporal 65 16 98 %   02/28/24 0845 -- -- -- -- 16 --   02/28/24 0815 (!) 112/56 97.5 °F (36.4 °C) Temporal 64 16 95 %   02/28/24 0806 -- -- -- -- -- 94 %       General: alert, appears stated age, and cooperative   Exam: Incision clean, dry, and intact, no evidence of infection. Fair active motion to the right lower extremity   Neurovascular: Exam normal       Data Review:  Recent Labs     02/28/24  0212 02/29/24  0400   HGB 11.8* 11.8*     Recent Labs     02/29/24  0400   *   K 4.5   CREATININE 0.9     Recent Labs     02/29/24  0400   LABGLOM >60           Assessment:     Status Post right tka.      Plan:     DC home.      Electronically signed by Chet Kumar MD on 2/29/2024 at 7:17 AM      
fairly well overall. Completed stair training with correct technique after cueing. Assisted up to chair with all needs in reach.   Activity Tolerance   Activity Tolerance Patient tolerated treatment well   PT Plan of Care   Thursday X   Safety Devices   Type of Devices Call light within reach;Left in chair  (family member present)           Electronically signed by Gypsy South PTA on 2/29/2024 at 9:38 AM

## 2024-03-01 NOTE — OUTREACH NOTE
Call Center TCM Note      Flowsheet Row Responses   [facility]   Does the patient have one of the following disease processes/diagnoses(primary or secondary)? Other   TCM attempt successful? Yes   Call start time 1323   Call end time 1325   Discharge diagnosis Primary osteoarthritis of right knee. Total knee arthro   Meds reviewed with patient/caregiver? Yes   Is the patient having any side effects they believe may be caused by any medication additions or changes? No   Does the patient have all medications ordered at discharge? Yes   Is the patient taking all medications as directed (includes completed medication regime)? Yes   Comments No appt noted with PCP at this time that meets TCM guidelines. Pt  to call office for appt.   Does the patient have an appointment with their PCP within 7-14 days of discharge? No appointments available   Nursing Interventions Routed TCM call to PCP office   Has home health visited the patient within 72 hours of discharge? Yes   Psychosocial issues? No   Did the patient receive a copy of their discharge instructions? Yes   Nursing interventions Reviewed instructions with patient   What is the patient's perception of their health status since discharge? Improving   Is the patient/caregiver able to teach back signs and symptoms related to disease process for when to call PCP? Yes   Is the patient/caregiver able to teach back signs and symptoms related to disease process for when to call 911? Yes   Is the patient/caregiver able to teach back the hierarchy of who to call/visit for symptoms/problems? PCP, Specialist, Home health nurse, Urgent Care, ED, 911 Yes   TCM call completed? Yes   Wrap up additional comments Pt reports he is ok and has HOME HEALTH nurse and therapy at the home at this time.   Call end time 1325            Brooklyn Edouard RN    3/1/2024, 13:26 CST

## 2024-03-01 NOTE — OUTREACH NOTE
Prep Survey      Flowsheet Row Responses   [facility] facility patient discharged from? Non-[facility]   Is LACE score < 7 ? Non-BH Discharge   Eligibility [facility]   Date of Admission 02/26/24   Date of Discharge 02/29/24   Discharge Disposition Home or Self Care   Discharge diagnosis Primary osteoarthritis of right knee. Total knee arthro   Does the patient have one of the following disease processes/diagnoses(primary or secondary)? Total Joint Replacement   Does the patient have Home health ordered? No   Is there a DME ordered? No   Prep survey completed? Yes            JAY EVANS - Registered Nurse

## 2024-05-23 DIAGNOSIS — E11.40 TYPE 2 DIABETES MELLITUS WITH DIABETIC NEUROPATHY, WITHOUT LONG-TERM CURRENT USE OF INSULIN: Primary | Chronic | ICD-10-CM

## 2024-05-23 RX ORDER — METFORMIN HYDROCHLORIDE 1000 MG/1
TABLET, FILM COATED, EXTENDED RELEASE ORAL
Qty: 180 TABLET | Refills: 3 | Status: SHIPPED | OUTPATIENT
Start: 2024-05-23

## 2024-05-24 ENCOUNTER — OFFICE VISIT (OUTPATIENT)
Dept: INTERNAL MEDICINE | Facility: CLINIC | Age: 89
End: 2024-05-24
Payer: MEDICARE

## 2024-05-24 VITALS
DIASTOLIC BLOOD PRESSURE: 76 MMHG | BODY MASS INDEX: 30.7 KG/M2 | HEART RATE: 71 BPM | HEIGHT: 74 IN | TEMPERATURE: 97.8 F | SYSTOLIC BLOOD PRESSURE: 144 MMHG | OXYGEN SATURATION: 97 % | WEIGHT: 239.2 LBS

## 2024-05-24 DIAGNOSIS — I10 PRIMARY HYPERTENSION: Chronic | ICD-10-CM

## 2024-05-24 DIAGNOSIS — Z97.8 CHRONIC INDWELLING FOLEY CATHETER: ICD-10-CM

## 2024-05-24 DIAGNOSIS — C67.9 MALIGNANT NEOPLASM OF URINARY BLADDER, UNSPECIFIED SITE: Chronic | ICD-10-CM

## 2024-05-24 DIAGNOSIS — E11.40 TYPE 2 DIABETES MELLITUS WITH DIABETIC NEUROPATHY, WITHOUT LONG-TERM CURRENT USE OF INSULIN: Primary | Chronic | ICD-10-CM

## 2024-05-24 DIAGNOSIS — E03.9 ACQUIRED HYPOTHYROIDISM: Chronic | ICD-10-CM

## 2024-05-24 DIAGNOSIS — E78.2 MIXED HYPERLIPIDEMIA: Chronic | ICD-10-CM

## 2024-05-24 LAB
EXPIRATION DATE: ABNORMAL
HBA1C MFR BLD: 6.9 % (ref 4.5–5.7)
Lab: ABNORMAL

## 2024-05-24 PROCEDURE — G2211 COMPLEX E/M VISIT ADD ON: HCPCS | Performed by: INTERNAL MEDICINE

## 2024-05-24 PROCEDURE — 99214 OFFICE O/P EST MOD 30 MIN: CPT | Performed by: INTERNAL MEDICINE

## 2024-05-24 PROCEDURE — 1126F AMNT PAIN NOTED NONE PRSNT: CPT | Performed by: INTERNAL MEDICINE

## 2024-05-24 PROCEDURE — 83036 HEMOGLOBIN GLYCOSYLATED A1C: CPT | Performed by: INTERNAL MEDICINE

## 2024-05-24 PROCEDURE — 3044F HG A1C LEVEL LT 7.0%: CPT | Performed by: INTERNAL MEDICINE

## 2024-05-24 NOTE — PROGRESS NOTES
"      Chief Complaint  Diabetes (3 month follow up//A1C: 6.9) and Hypertension    Subjective        Sarkis Naranjo presents to [facility] PRIMARY [facility]ARE    HPI    Patient here for the above problems.  See Assessment and Plan for further HPI components.      Review of Systems    Objective   Vital Signs:  /76 (BP Location: Left arm, Patient Position: Sitting, Cuff Size: Adult)   Pulse 71   Temp 97.8 °F (36.6 °C) (Temporal)   Ht 188 cm (74.02\")   Wt 109 kg (239 lb 3.2 oz)   SpO2 97%   BMI 30.70 kg/m²   Estimated body mass index is 30.7 kg/m² as calculated from the following:    Height as of this encounter: 188 cm (74.02\").    Weight as of this encounter: 109 kg (239 lb 3.2 oz).      Physical Exam  Vitals and nursing note reviewed.   Constitutional:       Appearance: He is obese. He is not ill-appearing.   Eyes:      General: No scleral icterus.     Conjunctiva/sclera: Conjunctivae normal.   Pulmonary:      Effort: Pulmonary effort is normal. No respiratory distress.   Neurological:      General: No focal deficit present.      Mental Status: He is alert and oriented to person, place, and time.   Psychiatric:         Mood and Affect: Mood normal.         Behavior: Behavior normal.                       Assessment and Plan   Diagnoses and all orders for this visit:    1. Type 2 diabetes mellitus with diabetic neuropathy, without long-term current use of insulin (Primary)  -     POC Glycated Hemoglobin, Total    2. Primary hypertension  -     Comprehensive metabolic panel  -     CBC w AUTO Differential    3. Acquired hypothyroidism  -     TSH Rfx On Abnormal To Free T4    4. Chronic indwelling Light catheter    5. Malignant neoplasm of urinary bladder, unspecified site    6. Mixed hyperlipidemia  -     Lipid panel      Patient recently had his knee replaced.  Patient is recovering well.  Patient unfortunately did gain 10 pounds during this time.  The patient A1c fortunately has not increased " drastically, it is actually improved down to 6.9.  Patient is on Trulicity, glipizide, metformin long-acting insulin.  Patient's sugars have been running a little bit high and has had some hyperglycemia.  Recommend we continue the current regimen as the patient's A1c is less than 7.5.  At his advanced age, want to avoid hypoglycemia.    Patient has chronic indwelling Light catheter.  Patient follows with Dr. Hendricks.  Patient had a neoplasm of his urinary bladder and has had issues with urinary retention ever since.  Patient has his catheter changed out every month.  Patient follows closely with Dr. Hendricks.    Patient has hypertension.  Patient blood pressure little bit elevated today.  At the age of 88, blood pressure goal can be a little bit relaxed.  Given his history of coronary artery disease and diabetes, would like it to be a little bit closer to 130/80.  Patient is on atenolol and low-dose amlodipine.    Patient has a history of hyperlipidemia, coronary artery disease, diabetes.  Patient is on moderate density statin.  Recommend patient continue this.  We will check lipids today.    Result Review :                   BMI is >= 30 and <35. (Class 1 Obesity). The following options were offered after discussion;: exercise counseling/recommendations and nutrition counseling/recommendations            Follow Up   Return in about 3 months (around 8/24/2024), or if symptoms worsen or fail to improve, for longitudinal care, Recheck.  Patient was given instructions and counseling regarding his condition or for health maintenance advice. Please see specific information pulled into the AVS if appropriate.       NIK Brown MD, FACP, [facility]      Electronically signed by Vasu Brown MD, 05/24/24, 5:27 PM CDT.

## 2024-05-25 LAB
ALBUMIN SERPL-MCNC: 4.4 G/DL (ref 3.7–4.7)
ALBUMIN/GLOB SERPL: 1.6 {RATIO} (ref 1.2–2.2)
ALP SERPL-CCNC: 81 IU/L (ref 44–121)
ALT SERPL-CCNC: 17 IU/L (ref 0–44)
AST SERPL-CCNC: 16 IU/L (ref 0–40)
BASOPHILS # BLD AUTO: 0.1 X10E3/UL (ref 0–0.2)
BASOPHILS NFR BLD AUTO: 1 %
BILIRUB SERPL-MCNC: 0.5 MG/DL (ref 0–1.2)
BUN SERPL-MCNC: 16 MG/DL (ref 8–27)
BUN/CREAT SERPL: 17 (ref 10–24)
CALCIUM SERPL-MCNC: 10 MG/DL (ref 8.6–10.2)
CHLORIDE SERPL-SCNC: 101 MMOL/L (ref 96–106)
CHOLEST SERPL-MCNC: 113 MG/DL (ref 100–199)
CO2 SERPL-SCNC: 24 MMOL/L (ref 20–29)
CREAT SERPL-MCNC: 0.95 MG/DL (ref 0.76–1.27)
EGFRCR SERPLBLD CKD-EPI 2021: 77 ML/MIN/1.73
EOSINOPHIL # BLD AUTO: 0.3 X10E3/UL (ref 0–0.4)
EOSINOPHIL NFR BLD AUTO: 5 %
ERYTHROCYTE [DISTWIDTH] IN BLOOD BY AUTOMATED COUNT: 13.7 % (ref 11.6–15.4)
GLOBULIN SER CALC-MCNC: 2.7 G/DL (ref 1.5–4.5)
GLUCOSE SERPL-MCNC: 67 MG/DL (ref 70–99)
HCT VFR BLD AUTO: 41.8 % (ref 37.5–51)
HDLC SERPL-MCNC: 43 MG/DL
HGB BLD-MCNC: 13.9 G/DL (ref 13–17.7)
IMM GRANULOCYTES # BLD AUTO: 0 X10E3/UL (ref 0–0.1)
IMM GRANULOCYTES NFR BLD AUTO: 0 %
LDLC SERPL CALC-MCNC: 51 MG/DL (ref 0–99)
LYMPHOCYTES # BLD AUTO: 2.4 X10E3/UL (ref 0.7–3.1)
LYMPHOCYTES NFR BLD AUTO: 34 %
MCH RBC QN AUTO: 29 PG (ref 26.6–33)
MCHC RBC AUTO-ENTMCNC: 33.3 G/DL (ref 31.5–35.7)
MCV RBC AUTO: 87 FL (ref 79–97)
MONOCYTES # BLD AUTO: 0.9 X10E3/UL (ref 0.1–0.9)
MONOCYTES NFR BLD AUTO: 13 %
NEUTROPHILS # BLD AUTO: 3.4 X10E3/UL (ref 1.4–7)
NEUTROPHILS NFR BLD AUTO: 47 %
PLATELET # BLD AUTO: 295 X10E3/UL (ref 150–450)
POTASSIUM SERPL-SCNC: 4.5 MMOL/L (ref 3.5–5.2)
PROT SERPL-MCNC: 7.1 G/DL (ref 6–8.5)
RBC # BLD AUTO: 4.79 X10E6/UL (ref 4.14–5.8)
SODIUM SERPL-SCNC: 140 MMOL/L (ref 134–144)
T4 FREE SERPL-MCNC: 1.02 NG/DL (ref 0.82–1.77)
TRIGL SERPL-MCNC: 98 MG/DL (ref 0–149)
TSH SERPL DL<=0.005 MIU/L-ACNC: 6.33 UIU/ML (ref 0.45–4.5)
VLDLC SERPL CALC-MCNC: 19 MG/DL (ref 5–40)
WBC # BLD AUTO: 7.2 X10E3/UL (ref 3.4–10.8)

## 2024-06-10 RX ORDER — ATENOLOL 50 MG/1
TABLET ORAL
Qty: 90 TABLET | Refills: 3 | Status: SHIPPED | OUTPATIENT
Start: 2024-06-10

## 2024-07-26 RX ORDER — LEVOTHYROXINE SODIUM 0.05 MG/1
TABLET ORAL
Qty: 90 TABLET | Refills: 0 | Status: SHIPPED | OUTPATIENT
Start: 2024-07-26

## 2024-07-26 RX ORDER — ATORVASTATIN CALCIUM 20 MG/1
20 TABLET, FILM COATED ORAL DAILY
Qty: 90 TABLET | Refills: 0 | Status: SHIPPED | OUTPATIENT
Start: 2024-07-26

## 2024-07-29 RX ORDER — DULAGLUTIDE 3 MG/.5ML
0.5 INJECTION, SOLUTION SUBCUTANEOUS WEEKLY
Qty: 2 ML | Refills: 5 | Status: SHIPPED | OUTPATIENT
Start: 2024-07-29

## 2024-08-09 RX ORDER — MELOXICAM 15 MG/1
TABLET ORAL
Qty: 30 TABLET | Refills: 5 | Status: SHIPPED | OUTPATIENT
Start: 2024-08-09

## 2024-08-26 ENCOUNTER — OFFICE VISIT (OUTPATIENT)
Dept: INTERNAL MEDICINE | Facility: CLINIC | Age: 89
End: 2024-08-26
Payer: MEDICARE

## 2024-08-26 VITALS
BODY MASS INDEX: 30.54 KG/M2 | SYSTOLIC BLOOD PRESSURE: 120 MMHG | TEMPERATURE: 97.6 F | HEART RATE: 63 BPM | OXYGEN SATURATION: 98 % | WEIGHT: 238 LBS | HEIGHT: 74 IN | DIASTOLIC BLOOD PRESSURE: 76 MMHG

## 2024-08-26 DIAGNOSIS — I10 PRIMARY HYPERTENSION: Chronic | ICD-10-CM

## 2024-08-26 DIAGNOSIS — E11.40 TYPE 2 DIABETES MELLITUS WITH DIABETIC NEUROPATHY, WITHOUT LONG-TERM CURRENT USE OF INSULIN: Chronic | ICD-10-CM

## 2024-08-26 DIAGNOSIS — E78.2 MIXED HYPERLIPIDEMIA: Chronic | ICD-10-CM

## 2024-08-26 DIAGNOSIS — E03.9 ACQUIRED HYPOTHYROIDISM: Chronic | ICD-10-CM

## 2024-08-26 DIAGNOSIS — E66.09 CLASS 1 OBESITY DUE TO EXCESS CALORIES WITH SERIOUS COMORBIDITY AND BODY MASS INDEX (BMI) OF 30.0 TO 30.9 IN ADULT: Primary | ICD-10-CM

## 2024-08-26 PROBLEM — E66.811 CLASS 1 OBESITY DUE TO EXCESS CALORIES WITH SERIOUS COMORBIDITY AND BODY MASS INDEX (BMI) OF 30.0 TO 30.9 IN ADULT: Status: ACTIVE | Noted: 2024-08-26

## 2024-08-26 LAB — HBA1C MFR BLD: 7.4 % (ref 4.5–5.7)

## 2024-08-26 PROCEDURE — 1126F AMNT PAIN NOTED NONE PRSNT: CPT | Performed by: INTERNAL MEDICINE

## 2024-08-26 PROCEDURE — 3051F HG A1C>EQUAL 7.0%<8.0%: CPT | Performed by: INTERNAL MEDICINE

## 2024-08-26 PROCEDURE — 99214 OFFICE O/P EST MOD 30 MIN: CPT | Performed by: INTERNAL MEDICINE

## 2024-08-26 PROCEDURE — G2211 COMPLEX E/M VISIT ADD ON: HCPCS | Performed by: INTERNAL MEDICINE

## 2024-08-26 PROCEDURE — 83036 HEMOGLOBIN GLYCOSYLATED A1C: CPT | Performed by: INTERNAL MEDICINE

## 2024-08-26 RX ORDER — LANCETS 30 GAUGE
1 EACH MISCELLANEOUS 2 TIMES DAILY
Qty: 200 EACH | Refills: 3 | Status: SHIPPED | OUTPATIENT
Start: 2024-08-26

## 2024-08-26 NOTE — PROGRESS NOTES
"      Chief Complaint  Hypertension (3 month follow up/Wellness and labs due after 10/11/24) and Diabetes (A1c:  7.4)    Subjective        Sarkis Naranjo presents to [facility]IMARY [facility]    HPI    Patient here for the above problems.  See Assessment and Plan for further HPI components.      Review of Systems    Objective   Vital Signs:  /76 (BP Location: Left arm, Patient Position: Sitting, Cuff Size: Adult)   Pulse 63   Temp 97.6 °F (36.4 °C) (Temporal)   Ht 188 cm (74\")   Wt 108 kg (238 lb)   SpO2 98%   BMI 30.56 kg/m²   Estimated body mass index is 30.56 kg/m² as calculated from the following:    Height as of this encounter: 188 cm (74\").    Weight as of this encounter: 108 kg (238 lb).      Physical Exam  Vitals and nursing note reviewed.   Constitutional:       Appearance: He is not ill-appearing.   Eyes:      General: No scleral icterus.     Conjunctiva/sclera: Conjunctivae normal.   Pulmonary:      Effort: Pulmonary effort is normal. No respiratory distress.   Neurological:      General: No focal deficit present.      Mental Status: He is alert and oriented to person, place, and time.   Psychiatric:         Mood and Affect: Mood normal.         Behavior: Behavior normal.                     Assessment and Plan   Diagnoses and all orders for this visit:    1. Class 1 obesity due to excess calories with serious comorbidity and body mass index (BMI) of 30.0 to 30.9 in adult (Primary)    2. Primary hypertension  -     CBC & Differential; Future  -     Comprehensive Metabolic Panel; Future  -     Urinalysis With Microscopic - Urine, Clean Catch; Future    3. Mixed hyperlipidemia  -     Lipid Panel; Future  -     TSH Rfx On Abnormal To Free T4; Future    4. Type 2 diabetes mellitus with diabetic neuropathy, without long-term current use of insulin  -     POC Glycated Hemoglobin, Total  -     Microalbumin / Creatinine Urine Ratio - Urine, Clean Catch; Future  -     Hemoglobin A1c; " Future  -     glucose blood test strip; 1 each by Other route 2 (Two) Times a Day. Use as instructed  Dispense: 300 each; Refill: 3  -     Lancets misc; Use 1 each 2 (Two) Times a Day.  Dispense: 200 each; Refill: 3    5. Acquired hypothyroidism  -     TSH Rfx On Abnormal To Free T4; Future        Patient has hypertension.  BP is at goal.  The patient's BP goal is < 130/80.  Recommend ambulatory BP monitoring after patient has taken medication.  Recommend varying the times of the blood pressure readings.  Patient is currently on amlodipine 2.5 mg, atenolol 50 mg,.  Recommend we continue current regimen.    Continue to monitor.      Patient has hyperlipidemia.  Patient is currently on moderate-intensity statin with atorvastatin 20 mg.  Patient is at goal  Recommend we continue current regimen.    Recommend increased physical activity.  Recommend a diet that focuses on consumption of fruits, vegetables, fiber, and monounsaturated fats and minimizes intake of saturated and trans fats, simple carbohydrates, and red meats. Examples of heart-healthy diets include the DASH (Dietary Approaches to Stop Hypertension) and Mediterranean diets.  Continue to monitor.      Patient has diabetes.  Current A1c is 7.4.    Based on patient's last A1c they are currently at goal.. The patient's A1c goal is 7.0 - 7.5%  Recommend routine monitoring of blood sugar. Avoid hypoglycemia. Recommend ADA diet and avoidance of excess carbohydrates. Patient is currently on dulaglutide, glipizide, long-acting insulin, and metformin.  Recommend that we continue current regimen.  .  I suspect his previous 6.9 was due to post-operative blood loss and new RBCs with lack of glycosylation.    Recommend at least annual eye examination.  Recommend at least annual diabetic foot examination.  Recommend checking self checks of feet routinely.Patient has the following diabetic complications: obesity and hyperglycemia.    Continue to monitor.  Patient's last  "diabetic nephropathy evaluation was:  No results found for: \"CREATINIURIN\", \"MICROALBUR\", \"PROTEIN\", \"PROTEINPOCUA\"  A1C Last 3 Results          2/7/2024    10:30 5/24/2024    13:58 8/26/2024    13:19   HGBA1C Last 3 Results   Hemoglobin A1C 7.2     6.9  7.4       Details          This result is from an external source.                Patient has a BMI > 30.  Obesity increases risks of diseases such as diabetes, coronary artery disease, hypertension, sleep apnea, hyperlipidemia, arthritis, and stroke.  Recommend focusing on portion control and making healthy diet choices.  Avoid fast food.  Avoid calorie beverages including sweet tea, juice, soft drinks, and alcohol.  Recommend increasing your activity and starting a regular exercise program. Can consider utilizing calorie counting apps such as [email or web address].      Check TSH at next visit.  Patient is on levothyroxine.  Patient had a little bit elevated TSH on most recent check.    Result Review :                   BMI is >= 30 and <35. (Class 1 Obesity). The following options were offered after discussion;: exercise counseling/recommendations and nutrition counseling/recommendations            Follow Up   Return in about 3 months (around 11/26/2024), or if symptoms worsen or fail to improve, for follow up for above problems. Longitudinal care., Medicare Wellness - Labs prior to visit.  Patient was given instructions and counseling regarding his condition or for health maintenance advice. Please see specific information pulled into the AVS if appropriate.       NIK Brown MD, FAC, [facility]      Electronically signed by Vasu Brown MD, 08/26/24, 1:14 PM CDT.          "

## 2024-09-03 DIAGNOSIS — G62.9 NEUROPATHY: ICD-10-CM

## 2024-09-03 DIAGNOSIS — E11.40 TYPE 2 DIABETES MELLITUS WITH DIABETIC NEUROPATHY, WITHOUT LONG-TERM CURRENT USE OF INSULIN: ICD-10-CM

## 2024-09-03 RX ORDER — PREGABALIN 50 MG/1
50 CAPSULE ORAL 2 TIMES DAILY
Qty: 180 CAPSULE | Refills: 1 | Status: SHIPPED | OUTPATIENT
Start: 2024-09-03

## 2024-09-03 RX ORDER — GLIPIZIDE 10 MG/1
TABLET ORAL
Qty: 180 TABLET | Refills: 2 | Status: SHIPPED | OUTPATIENT
Start: 2024-09-03

## 2024-09-11 RX ORDER — INSULIN GLARGINE 100 [IU]/ML
INJECTION, SOLUTION SUBCUTANEOUS
Qty: 15 ML | Refills: 5 | Status: SHIPPED | OUTPATIENT
Start: 2024-09-11

## 2024-10-02 ENCOUNTER — OFFICE VISIT (OUTPATIENT)
Dept: CARDIOLOGY | Facility: CLINIC | Age: 89
End: 2024-10-02
Payer: MEDICARE

## 2024-10-02 VITALS
WEIGHT: 233 LBS | SYSTOLIC BLOOD PRESSURE: 130 MMHG | HEART RATE: 63 BPM | BODY MASS INDEX: 29.9 KG/M2 | DIASTOLIC BLOOD PRESSURE: 78 MMHG | HEIGHT: 74 IN

## 2024-10-02 DIAGNOSIS — I25.10 CORONARY ARTERY DISEASE INVOLVING NATIVE CORONARY ARTERY OF NATIVE HEART WITHOUT ANGINA PECTORIS: Primary | Chronic | ICD-10-CM

## 2024-10-02 DIAGNOSIS — E78.2 MIXED HYPERLIPIDEMIA: Chronic | ICD-10-CM

## 2024-10-02 DIAGNOSIS — I10 PRIMARY HYPERTENSION: Chronic | ICD-10-CM

## 2024-10-02 DIAGNOSIS — Z95.5 H/O HEART ARTERY STENT: ICD-10-CM

## 2024-10-02 PROBLEM — Z79.4 TYPE 2 DIABETES MELLITUS WITH DIABETIC NEUROPATHY, WITH LONG-TERM CURRENT USE OF INSULIN: Chronic | Status: ACTIVE | Noted: 2020-03-10

## 2024-10-02 PROBLEM — Z79.4 TYPE 2 DIABETES MELLITUS WITH DIABETIC NEUROPATHY, WITH LONG-TERM CURRENT USE OF INSULIN: Status: ACTIVE | Noted: 2020-03-10

## 2024-10-02 PROCEDURE — 93000 ELECTROCARDIOGRAM COMPLETE: CPT | Performed by: NURSE PRACTITIONER

## 2024-10-02 PROCEDURE — 99214 OFFICE O/P EST MOD 30 MIN: CPT | Performed by: NURSE PRACTITIONER

## 2024-10-02 NOTE — PROGRESS NOTES
Subjective:     Encounter Date:10/02/2024      Patient ID: Sarkis Naranjo is a 88 y.o. male with known coronary artery disease, hypertension, hyperlipidemia, and type II diabetes mellitus. He presents today for routine follow up.     Chief Complaint: CAD Follow Up  Coronary Artery Disease  Presents for follow-up visit. Symptoms include muscle weakness. Pertinent negatives include no chest pain, chest pressure, chest tightness, dizziness, leg swelling, palpitations or shortness of breath. The symptoms have been stable. Compliance with diet is good. Compliance with exercise is variable. Compliance with medications is good.     Mr. Naranjo presents to the office today with his wife for routine cardiac follow up. He tells me that he has been well from a cardiac standpoint since his visit with our office last year. He does mention for the past few weeks he has been sick, as well as his wife, with upper respiratory congestion and cough.  He denies any chest pain or tightness. He indicates upper respiratory symptoms but no significant shortness of breath. He denies any palpitations, lightheadedness, or dizziness. He has generalized weakness to his legs and arthritis. He uses gait assistance.       The following portions of the patient's history were reviewed and updated as appropriate: allergies, current medications, past family history, past medical history, past social history, and past surgical history.    No Known Allergies      Current Outpatient Medications:     amLODIPine (NORVASC) 2.5 MG tablet, TAKE ONE TABLET BY MOUTH DAILY, Disp: 90 tablet, Rfl: 3    aspirin 81 MG EC tablet, Take 1 tablet by mouth Daily., Disp: , Rfl:     atenolol (TENORMIN) 50 MG tablet, TAKE ONE TABLET BY MOUTH DAILY, Disp: 90 tablet, Rfl: 3    atorvastatin (LIPITOR) 20 MG tablet, TAKE ONE TABLET BY MOUTH DAILY, Disp: 90 tablet, Rfl: 0    Calcium Carb-Cholecalciferol 250-125 MG-UNIT tablet, Take 1 tablet by mouth Daily., Disp: , Rfl:      "Cinnamon 500 MG capsule, Take 2 capsules by mouth Daily., Disp: , Rfl:     dutasteride (AVODART) 0.5 MG capsule, Take 1 capsule by mouth Daily., Disp: , Rfl:     glucose blood test strip, 1 each by Other route 2 (Two) Times a Day. Use as instructed, Disp: 300 each, Rfl: 3    Insulin Pen Needle (PEN NEEDLES 3/16\") 31G X 5 MM misc, 1 each Daily., Disp: 90 each, Rfl: 3    Lancets misc, Use 1 each 2 (Two) Times a Day., Disp: 200 each, Rfl: 3    Lantus SoloStar 100 UNIT/ML injection pen, Inject 22 Units under the skin into the appropriate area as directed Daily., Disp: 15 mL, Rfl: 5    levothyroxine (SYNTHROID, LEVOTHROID) 50 MCG tablet, TAKE ONE TABLET BY MOUTH DAILY, Disp: 90 tablet, Rfl: 0    meloxicam (MOBIC) 15 MG tablet, TAKE 1 TABLET DAILY., Disp: 30 tablet, Rfl: 5    metFORMIN (GLUMETZA) 1000 MG (MOD) 24 hr tablet, TAKE ONE TABLET TWICE DAILY WITH MEALS, Disp: 180 tablet, Rfl: 3    Multiple Vitamins-Minerals (MULTIVITAMIN WITH MINERALS) tablet tablet, Take 1 tablet by mouth Daily., Disp: , Rfl:     Omega-3 Fatty Acids (FISH OIL) 1000 MG capsule capsule, Take 1 capsule by mouth Daily With Breakfast., Disp: , Rfl:     pregabalin (LYRICA) 50 MG capsule, TAKE ONE CAPSULE TWICE DAILY, Disp: 180 capsule, Rfl: 1    tamsulosin (FLOMAX) 0.4 MG capsule 24 hr capsule, Take 1 capsule by mouth 2 (Two) Times a Day., Disp: , Rfl:     Trulicity 3 MG/0.5ML solution pen-injector, Inject 0.5 mL under the skin into the appropriate area as directed 1 (One) Time Per Week., Disp: 2 mL, Rfl: 5    glipizide (GLUCOTROL) 10 MG tablet, TAKE 1 TABLET BY MOUTH TWICE DAILY AS NEEDED BEFORE MEALS, Disp: 180 tablet, Rfl: 2      Review of Systems   Constitutional: Negative for chills, diaphoresis and fever.   HENT:  Positive for congestion.    Cardiovascular:  Negative for chest pain, dyspnea on exertion, irregular heartbeat, leg swelling, near-syncope, orthopnea, palpitations, paroxysmal nocturnal dyspnea and syncope.   Respiratory:  Positive " "for cough. Negative for chest tightness, shortness of breath and wheezing.    Hematologic/Lymphatic: Negative for bleeding problem.   Musculoskeletal:  Positive for arthritis and muscle weakness.   Neurological:  Positive for loss of balance and weakness. Negative for dizziness, headaches and light-headedness.   Psychiatric/Behavioral:  Negative for altered mental status.          ECG 12 Lead    Date/Time: 10/2/2024 12:26 PM  Performed by: Silvana Escobar APRN    Authorized by: Silvana Escobar APRN  Comparison: compared with previous ECG from 9/28/2023  Similar to previous ECG  Rhythm: sinus rhythm  Rate: normal  BPM: 63  Conduction: 1st degree AV block  QRS axis: normal    Clinical impression: non-specific ECG             Objective:     Vitals reviewed.   Constitutional:       General: Awake.      Appearance: Normal and healthy appearance. Well-developed, well-groomed, overweight and not in distress.   HENT:      Head: Normocephalic and atraumatic.   Pulmonary:      Effort: Pulmonary effort is normal.      Breath sounds: Normal breath sounds. No wheezing. No rhonchi. No rales.   Cardiovascular:      Normal rate. Regular rhythm.      Murmurs: There is no murmur.      No gallop.  No rub.   Edema:     Peripheral edema absent.   Musculoskeletal:      Cervical back: Normal range of motion and neck supple. Skin:     General: Skin is warm and dry.   Neurological:      Mental Status: Alert, oriented to person, place, and time and oriented to person, place and time.   Psychiatric:         Attention and Perception: Attention normal.         Mood and Affect: Mood normal.         Speech: Speech normal.         Behavior: Behavior normal. Behavior is cooperative.         Thought Content: Thought content normal.         Cognition and Memory: Cognition and memory normal.         Judgment: Judgment normal.         /78   Pulse 63   Ht 188 cm (74\")   Wt 106 kg (233 lb)   BMI 29.92 kg/m²     Lab Review:     2010 - LAD " stent Dr. Broadbent      Assessment:          Diagnosis Plan   1. Coronary artery disease involving native coronary artery of native heart without angina pectoris        2. H/O heart artery stent        3. Primary hypertension        4. Mixed hyperlipidemia               Plan:       -CAD-stent: Stable denies any chest pain, chest pressure.  He reports no recent issues with shortness of breath or decreased stamina outside his baseline.  He had some acute illness with upper respiratory congestion but no other complaints recently.  Continue current medications including aspirin, statin, beta-blocker therapy.  Prior stent in 2010 to the LAD per Dr. Broadbent.     -Hypertension: Well-controlled.    - Hyperlipidemia: Recommend LDL goal of less than 70 given known coronary artery disease with prior PCI.  Currently managed on atorvastatin and lipids are followed through his primary care office.      No changes today.  Follow-up in 1 year, call sooner if needed.

## 2024-10-25 RX ORDER — LEVOTHYROXINE SODIUM 50 UG/1
TABLET ORAL
Qty: 90 TABLET | Refills: 1 | Status: SHIPPED | OUTPATIENT
Start: 2024-10-25

## 2024-10-28 LAB
ALBUMIN SERPL-MCNC: 4.1 G/DL (ref 3.5–5.2)
ALP SERPL-CCNC: 73 U/L (ref 40–129)
ALT SERPL-CCNC: 21 U/L (ref 5–41)
ANION GAP SERPL CALCULATED.3IONS-SCNC: 11 MMOL/L (ref 7–19)
AST SERPL-CCNC: 20 U/L (ref 5–40)
BASOPHILS # BLD: 0.1 K/UL (ref 0–0.2)
BASOPHILS NFR BLD: 1.5 % (ref 0–1)
BILIRUB SERPL-MCNC: 0.6 MG/DL (ref 0.2–1.2)
BUN SERPL-MCNC: 18 MG/DL (ref 8–23)
CALCIUM SERPL-MCNC: 9.6 MG/DL (ref 8.8–10.2)
CHLORIDE SERPL-SCNC: 102 MMOL/L (ref 98–111)
CHOLEST SERPL-MCNC: 124 MG/DL (ref 0–199)
CO2 SERPL-SCNC: 27 MMOL/L (ref 22–29)
CREAT SERPL-MCNC: 0.9 MG/DL (ref 0.7–1.2)
EOSINOPHIL # BLD: 0.4 K/UL (ref 0–0.6)
EOSINOPHIL NFR BLD: 5.4 % (ref 0–5)
ERYTHROCYTE [DISTWIDTH] IN BLOOD BY AUTOMATED COUNT: 13.9 % (ref 11.5–14.5)
GLUCOSE SERPL-MCNC: 145 MG/DL (ref 70–99)
HBA1C MFR BLD: 7.8 % (ref 4–5.6)
HCT VFR BLD AUTO: 44.2 % (ref 42–52)
HDLC SERPL-MCNC: 45 MG/DL (ref 40–60)
HGB BLD-MCNC: 14.1 G/DL (ref 14–18)
IMM GRANULOCYTES # BLD: 0 K/UL
LDLC SERPL CALC-MCNC: 57 MG/DL
LYMPHOCYTES # BLD: 2 K/UL (ref 1.1–4.5)
LYMPHOCYTES NFR BLD: 29.1 % (ref 20–40)
MCH RBC QN AUTO: 29.6 PG (ref 27–31)
MCHC RBC AUTO-ENTMCNC: 31.9 G/DL (ref 33–37)
MCV RBC AUTO: 92.9 FL (ref 80–94)
MONOCYTES # BLD: 0.8 K/UL (ref 0–0.9)
MONOCYTES NFR BLD: 11.3 % (ref 0–10)
NEUTROPHILS # BLD: 3.6 K/UL (ref 1.5–7.5)
NEUTS SEG NFR BLD: 52.3 % (ref 50–65)
PLATELET # BLD AUTO: 279 K/UL (ref 130–400)
PMV BLD AUTO: 9.9 FL (ref 9.4–12.4)
POTASSIUM SERPL-SCNC: 4.8 MMOL/L (ref 3.5–5)
PROT SERPL-MCNC: 7.3 G/DL (ref 6.4–8.3)
RBC # BLD AUTO: 4.76 M/UL (ref 4.7–6.1)
SODIUM SERPL-SCNC: 140 MMOL/L (ref 136–145)
T4 FREE SERPL-MCNC: 1.13 NG/DL (ref 0.93–1.7)
TRIGL SERPL-MCNC: 109 MG/DL (ref 0–149)
TSH SERPL DL<=0.005 MIU/L-ACNC: 6.44 UIU/ML (ref 0.27–4.2)
WBC # BLD AUTO: 6.8 K/UL (ref 4.8–10.8)

## 2024-11-04 ENCOUNTER — OFFICE VISIT (OUTPATIENT)
Dept: INTERNAL MEDICINE | Facility: CLINIC | Age: 89
End: 2024-11-04
Payer: MEDICARE

## 2024-11-04 VITALS
HEIGHT: 74 IN | HEART RATE: 64 BPM | WEIGHT: 235 LBS | DIASTOLIC BLOOD PRESSURE: 68 MMHG | TEMPERATURE: 98.4 F | OXYGEN SATURATION: 97 % | SYSTOLIC BLOOD PRESSURE: 132 MMHG | BODY MASS INDEX: 30.16 KG/M2

## 2024-11-04 DIAGNOSIS — E03.9 ACQUIRED HYPOTHYROIDISM: Chronic | ICD-10-CM

## 2024-11-04 DIAGNOSIS — I87.2 VENOUS INSUFFICIENCY: ICD-10-CM

## 2024-11-04 DIAGNOSIS — E66.811 CLASS 1 OBESITY DUE TO EXCESS CALORIES WITH SERIOUS COMORBIDITY AND BODY MASS INDEX (BMI) OF 30.0 TO 30.9 IN ADULT: ICD-10-CM

## 2024-11-04 DIAGNOSIS — E78.2 MIXED HYPERLIPIDEMIA: Chronic | ICD-10-CM

## 2024-11-04 DIAGNOSIS — Z23 FLU VACCINE NEED: Primary | ICD-10-CM

## 2024-11-04 DIAGNOSIS — E66.09 CLASS 1 OBESITY DUE TO EXCESS CALORIES WITH SERIOUS COMORBIDITY AND BODY MASS INDEX (BMI) OF 30.0 TO 30.9 IN ADULT: ICD-10-CM

## 2024-11-04 DIAGNOSIS — I10 PRIMARY HYPERTENSION: Chronic | ICD-10-CM

## 2024-11-04 DIAGNOSIS — E11.40 TYPE 2 DIABETES MELLITUS WITH DIABETIC NEUROPATHY, WITH LONG-TERM CURRENT USE OF INSULIN: Chronic | ICD-10-CM

## 2024-11-04 DIAGNOSIS — Z79.4 TYPE 2 DIABETES MELLITUS WITH DIABETIC NEUROPATHY, WITH LONG-TERM CURRENT USE OF INSULIN: Chronic | ICD-10-CM

## 2024-11-04 PROCEDURE — 99214 OFFICE O/P EST MOD 30 MIN: CPT | Performed by: INTERNAL MEDICINE

## 2024-11-04 PROCEDURE — 1159F MED LIST DOCD IN RCRD: CPT | Performed by: INTERNAL MEDICINE

## 2024-11-04 PROCEDURE — 90656 IIV3 VACC NO PRSV 0.5 ML IM: CPT | Performed by: INTERNAL MEDICINE

## 2024-11-04 PROCEDURE — G0008 ADMIN INFLUENZA VIRUS VAC: HCPCS | Performed by: INTERNAL MEDICINE

## 2024-11-04 PROCEDURE — 1126F AMNT PAIN NOTED NONE PRSNT: CPT | Performed by: INTERNAL MEDICINE

## 2024-11-04 PROCEDURE — G0439 PPPS, SUBSEQ VISIT: HCPCS | Performed by: INTERNAL MEDICINE

## 2024-11-04 PROCEDURE — 1160F RVW MEDS BY RX/DR IN RCRD: CPT | Performed by: INTERNAL MEDICINE

## 2024-11-04 RX ORDER — INSULIN GLARGINE 100 [IU]/ML
25 INJECTION, SOLUTION SUBCUTANEOUS DAILY
Start: 2024-11-04

## 2024-11-04 NOTE — PROGRESS NOTES
"       Subjective   The ABCs of the Annual Wellness Visit  Medicare Wellness Visit      Sarkis Naranjo is a 89 y.o. patient who presents for a Medicare Wellness Visit.    The following portions of the patient's history were reviewed and   updated as appropriate: allergies, current medications, past family history, past medical history, past social history, past surgical history, and problem list.    Compared to one year ago, the patient's physical   health is the same.  Compared to one year ago, the patient's mental   health is the same.    Recent Hospitalizations:  He was admitted within the past 365 days at [facility].     Current Medical Providers:  Patient Care Team:  Vasu Brown MD as PCP - General (Internal Medicine)  Ezekiel Heath MD as Cardiologist (Cardiology)  Meño Harris MD as Referring Physician (Internal Medicine)  Beni Woo MD as Consulting Physician (Ophthalmology)    Outpatient Medications Prior to Visit   Medication Sig Dispense Refill    amLODIPine (NORVASC) 2.5 MG tablet TAKE ONE TABLET BY MOUTH DAILY 90 tablet 3    aspirin 81 MG EC tablet Take 1 tablet by mouth Daily.      atenolol (TENORMIN) 50 MG tablet TAKE ONE TABLET BY MOUTH DAILY 90 tablet 3    atorvastatin (LIPITOR) 20 MG tablet TAKE ONE TABLET BY MOUTH DAILY 90 tablet 0    Calcium Carb-Cholecalciferol 250-125 MG-UNIT tablet Take 1 tablet by mouth Daily.      Cinnamon 500 MG capsule Take 2 capsules by mouth Daily.      dutasteride (AVODART) 0.5 MG capsule Take 1 capsule by mouth Daily.      glipizide (GLUCOTROL) 10 MG tablet TAKE 1 TABLET BY MOUTH TWICE DAILY AS NEEDED BEFORE MEALS 180 tablet 2    glucose blood test strip 1 each by Other route 2 (Two) Times a Day. Use as instructed 300 each 3    Insulin Pen Needle (PEN NEEDLES 3/16\") 31G X 5 MM misc 1 each Daily. 90 each 3    Lancets misc Use 1 each 2 (Two) Times a Day. 200 each 3    levothyroxine (SYNTHROID, LEVOTHROID) 50 MCG tablet TAKE ONE " TABLET BY MOUTH DAILY 90 tablet 1    meloxicam (MOBIC) 15 MG tablet TAKE 1 TABLET DAILY. 30 tablet 5    metFORMIN (GLUMETZA) 1000 MG (MOD) 24 hr tablet TAKE ONE TABLET TWICE DAILY WITH MEALS 180 tablet 3    Multiple Vitamins-Minerals (MULTIVITAMIN WITH MINERALS) tablet tablet Take 1 tablet by mouth Daily.      Omega-3 Fatty Acids (FISH OIL) 1000 MG capsule capsule Take 1 capsule by mouth Daily With Breakfast.      pregabalin (LYRICA) 50 MG capsule TAKE ONE CAPSULE TWICE DAILY 180 capsule 1    tamsulosin (FLOMAX) 0.4 MG capsule 24 hr capsule Take 1 capsule by mouth 2 (Two) Times a Day.      Trulicity 3 MG/0.5ML solution pen-injector Inject 0.5 mL under the skin into the appropriate area as directed 1 (One) Time Per Week. 2 mL 5    Lantus SoloStar 100 UNIT/ML injection pen Inject 22 Units under the skin into the appropriate area as directed Daily. 15 mL 5     No facility-administered medications prior to visit.     No opioid medication identified on active medication list. I have reviewed chart for other potential  high risk medication/s and harmful drug interactions in the elderly.      Aspirin is on active medication list. Aspirin use is indicated based on review of current medical condition/s. Pros and cons of this therapy have been discussed today. Benefits of this medication outweigh potential harm.  Patient has been encouraged to continue taking this medication.  .      Patient Active Problem List   Diagnosis    Coronary artery disease involving native coronary artery of native heart without angina pectoris    Hypertension    Hyperlipidemia    Acquired hypothyroidism    Type 2 diabetes mellitus with diabetic neuropathy, with long-term current use of insulin    Enlarged prostate    H/O heart artery stent    Neuropathy    Peripheral edema    Urothelial carcinoma    Screening for intestinal cancer    Ulcer of left lower extremity    Bladder cancer    Benign prostatic hyperplasia with urinary obstruction    Raised  "prostate specific antigen    Retention of urine    Osteoarthritis of multiple joints    Eczema    Ingrown toenail of right foot with infection    Arthritis of right knee    Chronic indwelling Light catheter    Class 1 obesity due to excess calories with serious comorbidity and body mass index (BMI) of 30.0 to 30.9 in adult     Advance Care Planning Advance Directive is not on file.  ACP discussion was held with the patient during this visit. Patient does not have an advance directive, information provided.            Objective   Vitals:    11/04/24 1302   BP: 132/68   BP Location: Left arm   Patient Position: Sitting   Cuff Size: Adult   Pulse: 64   Temp: 98.4 °F (36.9 °C)   TempSrc: Temporal   SpO2: 97%   Weight: 107 kg (235 lb)   Height: 188 cm (74\")   PainSc: 0-No pain       Estimated body mass index is 30.17 kg/m² as calculated from the following:    Height as of this encounter: 188 cm (74\").    Weight as of this encounter: 107 kg (235 lb).            Does the patient have evidence of cognitive impairment? No  Lab Results   Component Value Date    HGBA1C 7.8 (H) 10/28/2024                                                                                               Health  Risk Assessment    Smoking Status:  Social History     Tobacco Use   Smoking Status Never   Smokeless Tobacco Never     Alcohol Consumption:  Social History     Substance and Sexual Activity   Alcohol Use Not Currently    Comment: RARELY       Fall Risk Screen  STEADI Fall Risk Assessment has not been completed.    Depression Screening:      10/11/2023    11:33 AM   PHQ-2/PHQ-9 Depression Screening   Retired Little Interest or Pleasure in Doing Things 0-->not at all   Retired Feeling Down, Depressed or Hopeless 0-->not at all   Retired PHQ-9: Brief Depression Severity Measure Score 0     Health Habits and Functional and Cognitive Screening:      10/11/2023    11:35 AM   Functional & Cognitive Status   Do you have difficulty preparing food and " eating? No   Do you have difficulty bathing yourself, getting dressed or grooming yourself? No   Do you have difficulty using the toilet? No   Do you have difficulty moving around from place to place? No   Do you have trouble with steps or getting out of a bed or a chair? Yes   Current Diet Well Balanced Diet   Dental Exam Up to date   Eye Exam Up to date   Exercise (times per week) 2 times per week   Current Exercises Include Walking   Do you need help using the phone?  No   Are you deaf or do you have serious difficulty hearing?  No   Do you need help to go to places out of walking distance? Yes   Do you need help shopping? No   Do you need help preparing meals?  No   Do you need help with housework?  No   Do you need help with laundry? No   Do you need help taking your medications? No   Do you need help managing money? No   Do you ever drive or ride in a car without wearing a seat belt? No   Have you felt unusual stress, anger or loneliness in the last month? No   Who do you live with? Spouse   If you need help, do you have trouble finding someone available to you? No   Have you been bothered in the last four weeks by sexual problems? No   Do you have difficulty concentrating, remembering or making decisions? Yes           Age-appropriate Screening Schedule:  Refer to the list below for future screening recommendations based on patient's age, sex and/or medical conditions. Orders for these recommended tests are listed in the plan section. The patient has been provided with a written plan.    Health Maintenance List  Health Maintenance   Topic Date Due    TDAP/TD VACCINES (1 - Tdap) Never done    ZOSTER VACCINE (1 of 2) Never done    RSV Vaccine - Adults (1 - 1-dose 75+ series) Never done    DIABETIC EYE EXAM  10/15/2022    COVID-19 Vaccine (4 - 2024-25 season) 09/01/2024    URINE MICROALBUMIN  02/21/2025    HEMOGLOBIN A1C  04/28/2025    BMI FOLLOWUP  08/26/2025    LIPID PANEL  10/28/2025    ANNUAL WELLNESS VISIT   11/04/2025    INFLUENZA VACCINE  Completed    Pneumococcal Vaccine 65+  Completed                                                                                                                                                 CMS Preventative Services Quick Reference  Risk Factors Identified During Encounter  Immunizations Discussed/Encouraged: Tdap, Shingrix, and RSV (Respiratory Syncytial Virus)  Dental Screening Recommended  Vision Screening Recommended    The above risks/problems have been discussed with the patient.  Pertinent information has been shared with the patient in the After Visit Summary.  An After Visit Summary and PPPS were made available to the patient.    Follow Up:   Next Medicare Wellness visit to be scheduled in 1 year.        Diagnoses and all orders for this visit:    1. Flu vaccine need (Primary)  -     Fluzone >6mos    2. Acquired hypothyroidism    3. Class 1 obesity due to excess calories with serious comorbidity and body mass index (BMI) of 30.0 to 30.9 in adult    4. Type 2 diabetes mellitus with diabetic neuropathy, with long-term current use of insulin    5. Primary hypertension    6. Mixed hyperlipidemia    7. Venous insufficiency    Other orders  -     Insulin Glargine (Lantus SoloStar) 100 UNIT/ML injection pen; Inject 25 Units under the skin into the appropriate area as directed Daily.      Recommend at least annual dental and vision screening.  Recommend annual influenza vaccination  Recommend a varied diet and appropriate portion sizes.   CDC recommendations for physical activity:  At least 150 minutes a week (for example, 30 minutes a day, 5 days a week) of moderate-intensity activity such as brisk walking. Or can consider 75 minutes a week of vigorous-intensity activity such as hiking, jogging, or running.  At least 2 days a week of activities that strengthen muscles.  Plus activities to improve balance.    Given patients age and comorbidity, may not be able to perform  "exercise.      Result Review :           Follow Up:   Return in about 3 months (around 2/4/2025), or if symptoms worsen or fail to improve, for follow up for above problems. Longitudinal care..     An After Visit Summary and PPPS were made available to the patient.                   NIK Brown MD, FACP, [facility]      Electronically signed by Vasu Brown MD, 11/04/24, 1:25 PM CST.    Additional E&M Note during same encounter follows:  Patient has additional, significant, and separately identifiable condition(s)/problem(s) that require work above and beyond the Medicare Wellness Visit       Chief Complaint  Medicare Wellness-subsequent    Subjective        HPI  Sarkis Naranjo is also being seen today for diabetes  Patient here for the above problems.  See Assessment and Plan for further HPI components.        Objective   Vitals:    11/04/24 1302   BP: 132/68   BP Location: Left arm   Patient Position: Sitting   Cuff Size: Adult   Pulse: 64   Temp: 98.4 °F (36.9 °C)   TempSrc: Temporal   SpO2: 97%   Weight: 107 kg (235 lb)   Height: 188 cm (74\")   PainSc: 0-No pain     Physical Exam  Vitals and nursing note reviewed.   Constitutional:       Appearance: He is not ill-appearing.   Eyes:      General: No scleral icterus.     Conjunctiva/sclera: Conjunctivae normal.   Cardiovascular:      Rate and Rhythm: Normal rate and regular rhythm.   Pulmonary:      Effort: Pulmonary effort is normal. No respiratory distress.   Neurological:      General: No focal deficit present.      Mental Status: He is alert and oriented to person, place, and time.   Psychiatric:         Mood and Affect: Mood normal.         Behavior: Behavior normal.              The following data was reviewed by: Vasu Brown MD on 11/04/2024:  CMP          5/24/2024    13:19 10/28/2024    07:35   CMP   Glucose 67  145       BUN 16  18       Creatinine 0.95  0.9       Sodium 140  140       Potassium 4.5  4.8       Chloride 101  102       Calcium " 10.0  9.6       Total Protein 7.1     Total Protein  7.3       Albumin 4.4  4.1       Globulin 2.7     Total Bilirubin 0.5  0.6       Alkaline Phosphatase 81  73       AST (SGOT) 16  20       ALT (SGPT) 17  21       BUN/Creatinine Ratio 17     Anion Gap  11          Details          This result is from an external source.             CBC w/diff          2/29/2024    04:00 5/24/2024    13:19 10/28/2024    07:35   CBC w/Diff   WBC  7.2  6.8       RBC  4.79  4.76       Hemoglobin 11.8     13.9  14.1       Hematocrit 33.9     41.8  44.2       MCV  87  92.9       MCH  29.0  29.6       MCHC  33.3  31.9       RDW  13.7  13.9       Platelets  295  279       Neutrophil Rel %  47  52.3       Lymphocyte Rel %  34  29.1       Monocyte Rel %  13  11.3       Eosinophil Rel %  5  5.4       Basophil Rel %  1  1.5          Details          This result is from an external source.             Lipid Panel          5/24/2024    13:19   Lipid Panel   Total Cholesterol 113    Triglycerides 98    HDL Cholesterol 43    VLDL Cholesterol 19    LDL Cholesterol  51      TSH          5/24/2024    13:19   TSH   TSH 6.330      A1C Last 3 Results          5/24/2024    13:58 8/26/2024    13:19 10/28/2024    07:35   HGBA1C Last 3 Results   Hemoglobin A1C 6.9  7.4  7.8          Details          This result is from an external source.               UA          2/7/2024    10:30   Urinalysis   Specific Gravity, UA 1.030       Blood, UA LARGE       Leukocytes, UA MODERATE       Nitrite, UA POSITIVE          Details          This result is from an external source.                      Assessment and Plan   Diagnoses and all orders for this visit:    1. Flu vaccine need (Primary)  -     Fluzone >6mos    2. Acquired hypothyroidism    3. Class 1 obesity due to excess calories with serious comorbidity and body mass index (BMI) of 30.0 to 30.9 in adult    4. Type 2 diabetes mellitus with diabetic neuropathy, with long-term current use of insulin    5. Primary  hypertension    6. Mixed hyperlipidemia    7. Venous insufficiency    Other orders  -     Insulin Glargine (Lantus SoloStar) 100 UNIT/ML injection pen; Inject 25 Units under the skin into the appropriate area as directed Daily.      History of Present Illness  The patient is an 89-year-old gentleman presenting today for a Medicare wellness visit. He is accompanied by an adult female.  He reports a stable health condition compared to the previous year. He has an advanced directive or living will in place.  He underwent knee replacement surgery last year, which required a hospital stay of 3 days.  His blood sugar levels have been slightly elevated, with readings of 118, 118, 102, and 106 at the start of this month. He is on a regimen of insulin, glipizide, and metformin for diabetes management. He has a fondness for crackers.  He received his influenza vaccine today. He also mentions experiencing some swelling.    Assessment & Plan  1. Diabetes Mellitus.  A1c has increased to 7.8 from previous values of 6.9 and 7.4. Insulin dosage will be increased from 22 units to 25 units. If blood sugar levels remain high for 3 to 4 consecutive days, the insulin dosage can be increased to 27 units. If the dosage reaches 30 units, he should inform the provider before any further increase. He is advised to reduce carbohydrate intake, particularly crackers, and monitor his diet more closely.    2. Health Maintenance.  He received his flu shot today. He is advised to consider the RSV vaccine, which can be administered at a pharmacy.  3. Venous insufficiency  Patient has venous insufficiency.  This is gravity dependent.  Worse at the end of the day.  Better in AM.  Recommend low salt diet.  Recommend elevation of lower extremities.  Recommend compression stockings as tolerated.  If persists, then we may need to consider secondary causes or other treatment.    4. Obesity  Patient has a BMI > 30.  Obesity increases risks of diseases such  as diabetes, coronary artery disease, hypertension, sleep apnea, hyperlipidemia, arthritis, and stroke.  Recommend focusing on portion control and making healthy diet choices.  Avoid fast food.  Avoid calorie beverages including sweet tea, juice, soft drinks, and alcohol.  Recommend increasing your activity and starting a regular exercise program. Can consider utilizing calorie counting apps such as [employer].          Follow-up  Return in 3 months for follow up.         Follow Up   Return in about 3 months (around 2/4/2025), or if symptoms worsen or fail to improve, for follow up for above problems. Longitudinal care..  Patient was given instructions and counseling regarding his condition or for health maintenance advice. Please see specific information pulled into the AVS if appropriate.   Patient or patient representative verbalized consent for the use of Ambient Listening during the visit with  Vasu Brown MD for chart documentation. 11/4/2024  17:23 CST

## 2024-11-06 DIAGNOSIS — E11.40 TYPE 2 DIABETES MELLITUS WITH DIABETIC NEUROPATHY, WITHOUT LONG-TERM CURRENT USE OF INSULIN: Chronic | ICD-10-CM

## 2024-11-06 RX ORDER — LANCETS 30 GAUGE
1 EACH MISCELLANEOUS 2 TIMES DAILY
Qty: 200 EACH | Refills: 3 | Status: SHIPPED | OUTPATIENT
Start: 2024-11-06

## 2024-11-06 RX ORDER — BLOOD-GLUCOSE METER
1 KIT MISCELLANEOUS ONCE
Qty: 1 EACH | Refills: 0 | Status: SHIPPED | OUTPATIENT
Start: 2024-11-06 | End: 2024-11-06

## 2024-11-06 NOTE — TELEPHONE ENCOUNTER
Caller: arabella corneliusnora    Relationship: Emergency Contact    Best call back number: [phone]     What medication are you requesting: TEST STRIPS, LANCETS, SOLUTION AND METER. WOULD LIKE TO BE EITHER ONETOUCH LIFE SCAN OR ACCUCHECK    What are your current symptoms: DIABETIC    How long have you been experiencing symptoms: ON-GOING    Have you had these symptoms before:    [x] Yes  [] No    Have you been treated for these symptoms before:   [x] Yes  [] No    If a prescription is needed, what is your preferred pharmacy and phone number: [facility] [address] [phone] [facility] [phone]      Additional notes: WAS ADVISED BY PHARMACY THESE WOULD HAVE NO CO-PAY

## 2024-12-02 RX ORDER — AMLODIPINE BESYLATE 2.5 MG/1
2.5 TABLET ORAL DAILY
Qty: 90 TABLET | Refills: 3 | Status: SHIPPED | OUTPATIENT
Start: 2024-12-02

## 2025-01-07 RX ORDER — DULAGLUTIDE 3 MG/.5ML
INJECTION, SOLUTION SUBCUTANEOUS
Qty: 2 ML | Refills: 0 | Status: SHIPPED | OUTPATIENT
Start: 2025-01-17

## 2025-02-03 ENCOUNTER — TELEPHONE (OUTPATIENT)
Dept: INTERNAL MEDICINE | Facility: CLINIC | Age: 89
End: 2025-02-03
Payer: MEDICARE

## 2025-02-03 RX ORDER — ATORVASTATIN CALCIUM 20 MG/1
20 TABLET, FILM COATED ORAL DAILY
Qty: 90 TABLET | Refills: 2 | Status: SHIPPED | OUTPATIENT
Start: 2025-02-03

## 2025-02-03 RX ORDER — DULAGLUTIDE 3 MG/.5ML
0.5 INJECTION, SOLUTION SUBCUTANEOUS WEEKLY
Qty: 2 ML | Refills: 5 | Status: SHIPPED | OUTPATIENT
Start: 2025-02-03

## 2025-02-03 NOTE — TELEPHONE ENCOUNTER
Caller: erwin cornelius    Relationship: Emergency Contact    Best call back number: [phone]     Requested Prescriptions:   Requested Prescriptions     Pending Prescriptions Disp Refills    atorvastatin (LIPITOR) 20 MG tablet 90 tablet 0     Sig: Take 1 tablet by mouth Daily.    Dulaglutide (Trulicity) 3 MG/0.5ML solution auto-injector 2 mL 0        Pharmacy where request should be sent: [facility] [phone] [facility] [phone] [facility]     Last office visit with prescribing clinician: Visit date not found   Last telemedicine visit with prescribing clinician: Visit date not found   Next office visit with prescribing clinician: Visit date not found     Additional details provided by patient: HAS A 1 DAY SUPPLY    Does the patient have less than a 3 day supply:  [x] Yes  [] No    Would you like a call back once the refill request has been completed: [] Yes [x] No    If the office needs to give you a call back, can they leave a voicemail: [] Yes [x] No    Moy Busch III, RegLisa Rep   02/03/25 09:27 CST

## 2025-02-05 ENCOUNTER — OFFICE VISIT (OUTPATIENT)
Dept: INTERNAL MEDICINE | Facility: CLINIC | Age: 89
End: 2025-02-05
Payer: MEDICARE

## 2025-02-05 VITALS
OXYGEN SATURATION: 95 % | TEMPERATURE: 97.2 F | HEIGHT: 74 IN | DIASTOLIC BLOOD PRESSURE: 64 MMHG | WEIGHT: 237 LBS | BODY MASS INDEX: 30.42 KG/M2 | SYSTOLIC BLOOD PRESSURE: 118 MMHG | HEART RATE: 68 BPM

## 2025-02-05 DIAGNOSIS — I10 PRIMARY HYPERTENSION: ICD-10-CM

## 2025-02-05 DIAGNOSIS — M19.90 ARTHRITIS: ICD-10-CM

## 2025-02-05 DIAGNOSIS — E11.40 TYPE 2 DIABETES MELLITUS WITH DIABETIC NEUROPATHY, WITH LONG-TERM CURRENT USE OF INSULIN: Primary | Chronic | ICD-10-CM

## 2025-02-05 DIAGNOSIS — E66.811 CLASS 1 OBESITY DUE TO EXCESS CALORIES WITH SERIOUS COMORBIDITY AND BODY MASS INDEX (BMI) OF 30.0 TO 30.9 IN ADULT: ICD-10-CM

## 2025-02-05 DIAGNOSIS — I87.2 VENOUS INSUFFICIENCY: ICD-10-CM

## 2025-02-05 DIAGNOSIS — E66.09 CLASS 1 OBESITY DUE TO EXCESS CALORIES WITH SERIOUS COMORBIDITY AND BODY MASS INDEX (BMI) OF 30.0 TO 30.9 IN ADULT: ICD-10-CM

## 2025-02-05 DIAGNOSIS — Z79.4 TYPE 2 DIABETES MELLITUS WITH DIABETIC NEUROPATHY, WITH LONG-TERM CURRENT USE OF INSULIN: Primary | Chronic | ICD-10-CM

## 2025-02-05 LAB — HBA1C MFR BLD: 7.7 % (ref 4.5–5.7)

## 2025-02-05 PROCEDURE — 83036 HEMOGLOBIN GLYCOSYLATED A1C: CPT | Performed by: INTERNAL MEDICINE

## 2025-02-05 PROCEDURE — 1126F AMNT PAIN NOTED NONE PRSNT: CPT | Performed by: INTERNAL MEDICINE

## 2025-02-05 PROCEDURE — 3051F HG A1C>EQUAL 7.0%<8.0%: CPT | Performed by: INTERNAL MEDICINE

## 2025-02-05 PROCEDURE — 99214 OFFICE O/P EST MOD 30 MIN: CPT | Performed by: INTERNAL MEDICINE

## 2025-02-12 PROBLEM — M19.90 ARTHRITIS: Status: ACTIVE | Noted: 2025-02-12

## 2025-02-12 PROBLEM — I87.2 VENOUS INSUFFICIENCY: Status: ACTIVE | Noted: 2025-02-12

## 2025-02-13 NOTE — PROGRESS NOTES
"      Chief Complaint  Hypertension (3 MONTH FOLLOW UP ), Diabetes (A1C:  7.7), and discuss medication (Has stopped meloxicam as he felt it was not helping discuss CBD gummies )    Subjective        Sarkis Naranjo presents to [facility]    HPI    Patient here for the above problems.  See Assessment and Plan for further HPI components.      Review of Systems    Objective   Vital Signs:  /64 (BP Location: Left arm, Patient Position: Sitting, Cuff Size: Adult)   Pulse 68   Temp 97.2 °F (36.2 °C) (Temporal)   Ht 188 cm (74\")   Wt 108 kg (237 lb)   SpO2 95%   BMI 30.43 kg/m²   Estimated body mass index is 30.43 kg/m² as calculated from the following:    Height as of this encounter: 188 cm (74\").    Weight as of this encounter: 108 kg (237 lb).      Physical Exam  Vitals and nursing note reviewed.   Constitutional:       Appearance: He is obese. He is not ill-appearing.   Eyes:      General: No scleral icterus.     Conjunctiva/sclera: Conjunctivae normal.   Pulmonary:      Effort: Pulmonary effort is normal. No respiratory distress.   Neurological:      General: No focal deficit present.      Mental Status: He is alert and oriented to person, place, and time.   Psychiatric:         Mood and Affect: Mood normal.         Behavior: Behavior normal.                     Assessment and Plan   Diagnoses and all orders for this visit:    1. Type 2 diabetes mellitus with diabetic neuropathy, with long-term current use of insulin (Primary)  -     POC Glycated Hemoglobin, Total    2. Class 1 obesity due to excess calories with serious comorbidity and body mass index (BMI) of 30.0 to 30.9 in adult    3. Primary hypertension    4. Venous insufficiency    5. Arthritis    Other orders  -     Unable to find; Diclofenac 4%, Bupivacaine 2%, Gabapentin 10%, Ibuprofen 3%, Cyclobenzaprine 2%, with DMSO  Apply 1-2 gms (1-2 pumps) to affected area 3-4 times daily  Compounded thru [facility] in " [place]  Dispense: 90 g; Refill: 2        History of Present Illness  The patient presents for evaluation of diabetes and pain in his fingers and shoulders.    He has been experiencing elevated blood glucose levels, which he attributes to dietary indulgences during the holiday season. He has been adhering to a regimen of 25 units of long-acting insulin.    He reports experiencing pain in his fingers and shoulders, with the right hand being more affected than the left. He is right-handed. He has discontinued the use of meloxicam due to perceived lack of efficacy. He had previously found relief with this medication prior to undergoing hip replacement surgery. He expresses interest in exploring the use of CBD gummies as a potential treatment option.    MEDICATIONS  Discontinued: Meloxicam      Assessment & Plan  1. Diabetes mellitus.  His A1c level is currently at 7.7, indicating a need for improved glycemic control. It is suspected that recent dietary habits, particularly during the holiday season, may have contributed to this elevation. He is advised to increase his long-acting insulin dosage to 27 units for a few days, provided there are no episodes of hypoglycemia at night. Once his blood glucose levels stabilize, he can revert to his usual dosage of 25 units. If his blood glucose levels remain elevated at the next visit, a more aggressive treatment approach will be considered.    2. Pain in fingers and shoulders.  Arthritis multiple sites  He reports pain in his fingers and shoulders, with the right hand being more affected. He is advised to try CBD gummies for pain management, with caution to monitor for any confusion as a side effect. A prescription for a topical cream that is compounded will be ordered.    3. Venous insufficiency  Patient has venous insufficiency.  This is gravity dependent.  Worse at the end of the day.  Better in AM.  Recommend low salt diet.  Recommend elevation of lower extremities.   Recommend compression stockings as tolerated.  If persists, then we may need to consider secondary causes or other treatment.      4. Obesity  Patient has a BMI > 30.  Obesity increases risks of diseases such as diabetes, coronary artery disease, hypertension, sleep apnea, hyperlipidemia, arthritis, and stroke.  Recommend focusing on portion control and making healthy diet choices.  Avoid fast food.  Avoid calorie beverages including sweet tea, juice, soft drinks, and alcohol.  Recommend increasing your activity and starting a regular exercise program. Can consider utilizing calorie counting apps such as [employer].              PROCEDURE  The patient underwent hip replacement surgery in the past.      Result Review :                   BMI is >= 30 and <35. (Class 1 Obesity). The following options were offered after discussion;: none (medical contraindication)            Follow Up   Return in about 3 months (around 5/5/2025), or if symptoms worsen or fail to improve, for follow up for above problems. Longitudinal care..  Patient was given instructions and counseling regarding his condition or for health maintenance advice. Please see specific information pulled into the AVS if appropriate.       NIK Brown MD, FACP, [facility]      Electronically signed by Vasu Brown MD, 02/12/25, 7:53 PM CST.    Patient or patient representative verbalized consent for the use of Ambient Listening during the visit with  Vasu Brown MD for chart documentation. 2/12/2025  19:53 CST

## 2025-02-27 ENCOUNTER — OFFICE VISIT (OUTPATIENT)
Age: 89
End: 2025-02-27

## 2025-02-27 VITALS — WEIGHT: 237 LBS | HEIGHT: 74 IN | BODY MASS INDEX: 30.42 KG/M2

## 2025-02-27 DIAGNOSIS — Z96.651 STATUS POST RIGHT KNEE REPLACEMENT: Primary | ICD-10-CM

## 2025-02-27 DIAGNOSIS — M17.12 PRIMARY OSTEOARTHRITIS OF LEFT KNEE: ICD-10-CM

## 2025-02-27 NOTE — PROGRESS NOTES
Orthopaedic Clinic Note - Postop    NAME:  Tanmay Gilmore   : 1935  MRN: [id number]      2025     CHIEF COMPLAINT:   Chief Complaint   Patient presents with    Follow-up     Right knee  SX: 2024       HISTORY OF PRESENT ILLNESS:    Patient returns today for follow up of the right knee.  Pain has  resolved to the right knee.  He has become more uncomfortable with his left knee and uses a cane for the left knee pain . There were no postoperative complications.  He is here today with his wife.  He would like to set up surgery for the left knee as well.       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    Diabetes mellitus (HCC)     Hyperlipidemia     Hypertension     Indwelling catheter present on admission     Irregular heart rhythm     Thyroid disease        Past Surgical History:        Procedure Laterality Date    CARPAL TUNNEL RELEASE Bilateral     CATARACT EXTRACTION EXTRACAPSULAR W/ INTRAOCULAR LENS IMPLANTATION Bilateral     CORONARY ANGIOPLASTY WITH STENT PLACEMENT      x1    ENDOSCOPY, COLON, DIAGNOSTIC      HERNIA REPAIR      JOINT REPLACEMENT Bilateral     total hip replacement    TOTAL KNEE ARTHROPLASTY Right 2024    ROBOTIC COMPLEX RIGHT TOTAL KNEE ARTHROPLASTY performed by Chet Kumar MD at [facility] OR       Current Medications:   Prior to Admission medications    Medication Sig Start Date End Date Taking? Authorizing Provider   aspirin 81 MG chewable tablet Take 1 tablet by mouth 2 times daily 24  Yes Chet Kumar MD   ondansetron (ZOFRAN) 4 MG tablet Take 1 tablet by mouth every 8 hours as needed for Nausea or Vomiting 24  Yes Chet Kumar MD   metFORMIN (GLUCOPHAGE) 1000 MG tablet Take 1 tablet by mouth 2 times daily (with meals)   Yes Joseph Auguste MD   dulaglutide (TRULICITY) 1.5 MG/0.5ML SC injection Inject 0.5 mLs into the skin once a week   Yes Joseph Auguste MD   insulin glargine

## 2025-03-05 RX ORDER — PEN NEEDLE, DIABETIC 30 GX3/16"
1 NEEDLE, DISPOSABLE MISCELLANEOUS DAILY
Qty: 90 EACH | Refills: 3 | Status: SHIPPED | OUTPATIENT
Start: 2025-03-05

## 2025-03-05 NOTE — TELEPHONE ENCOUNTER
"  Caller: ashlierwin    Relationship: Emergency Contact    Best call back number:  [phone]      Insulin Pen Needle (PEN NEEDLES 3/16\") 31G X 5 MM [facility]     [place]armacy where request should be sent: [facility] [address] [facility] [phone] [facility] [phone] FX     Last office visit with prescribing clinician: 2/5/2025   Last telemedicine visit with prescribing clinician: Visit date not found   Next office visit with prescribing clinician: 5/28/2025     Does the patient have less than a 3 day supply:  [x] Yes  [] No    Would you like a call back once the refill request has been completed: [x] Yes [] No    If the office needs to give you a call back, can they leave a voicemail: [x] Yes [] No    Doyle Gonzales Rep   03/05/25 08:38 CST     "

## 2025-03-13 DIAGNOSIS — G62.9 NEUROPATHY: ICD-10-CM

## 2025-03-14 RX ORDER — PREGABALIN 50 MG/1
50 CAPSULE ORAL 2 TIMES DAILY
Qty: 180 CAPSULE | Refills: 1 | Status: SHIPPED | OUTPATIENT
Start: 2025-03-14

## 2025-04-04 DIAGNOSIS — E11.40 TYPE 2 DIABETES MELLITUS WITH DIABETIC NEUROPATHY, WITHOUT LONG-TERM CURRENT USE OF INSULIN: ICD-10-CM

## 2025-04-04 RX ORDER — GLIPIZIDE 10 MG/1
TABLET ORAL
Qty: 180 TABLET | Refills: 3 | Status: SHIPPED | OUTPATIENT
Start: 2025-04-04

## 2025-04-22 DIAGNOSIS — E11.40 TYPE 2 DIABETES MELLITUS WITH DIABETIC NEUROPATHY, WITHOUT LONG-TERM CURRENT USE OF INSULIN: Chronic | ICD-10-CM

## 2025-04-22 RX ORDER — METFORMIN HYDROCHLORIDE 1000 MG/1
1000 TABLET, FILM COATED, EXTENDED RELEASE ORAL 2 TIMES DAILY WITH MEALS
Qty: 60 TABLET | Refills: 11 | Status: SHIPPED | OUTPATIENT
Start: 2025-04-22 | End: 2025-04-24

## 2025-04-22 NOTE — TELEPHONE ENCOUNTER
Caller: erwin cornelius    Relationship: Emergency Contact    Best call back number: [phone]     Requested Prescriptions:   Requested Prescriptions     Pending Prescriptions Disp Refills    metFORMIN (GLUMETZA) 1000 MG (MOD) 24 hr tablet 180 tablet 3     Sig: Take 1 tablet by mouth 2 (Two) Times a Day With Meals.        Pharmacy where request should be sent: [address] [phone] [facility] [phone]      Last office visit with prescribing clinician: 2/5/2025   Last telemedicine visit with prescribing clinician: Visit date not found   Next office visit with prescribing clinician: 5/28/2025     Additional details provided by patient: PATIENT ONLY HAS ENOUGH FOR TODAY    Does the patient have less than a 3 day supply:  [x] Yes  [] No    Would you like a call back once the refill request has been completed: [] Yes [x] No    If the office needs to give you a call back, can they leave a voicemail: [x] Yes [] No    Doyle Mitchell Rep   04/22/25 11:20 CDT

## 2025-04-24 ENCOUNTER — TELEPHONE (OUTPATIENT)
Dept: INTERNAL MEDICINE | Facility: CLINIC | Age: 89
End: 2025-04-24
Payer: MEDICARE

## 2025-04-24 RX ORDER — METFORMIN HYDROCHLORIDE 500 MG/1
1000 TABLET, EXTENDED RELEASE ORAL 2 TIMES DAILY
Qty: 360 TABLET | Refills: 2 | Status: SHIPPED | OUTPATIENT
Start: 2025-04-24

## 2025-04-24 NOTE — TELEPHONE ENCOUNTER
Caller: erwin cornelius    Relationship to patient: Emergency Contact      Best call back number:     [phone]       Provider: DR. REILLY    Medication PA needed: metFORMIN (GLUMETZA) 1000 MG (MOD) 24 hr tablet     Reason for call/Prior Auth: [facility] TOLD PATIENT TO CALL PROVIDER BECAUSE THIS NEEDS A PRIOR AUTH.

## 2025-04-24 NOTE — TELEPHONE ENCOUNTER
Can check with pharmacy and see if this is what he has been getting all along and if it has been getting covered by insurance.  Usually, insurance won't pay for the 1,000mg tablet and we have to give the 500mg extended release and have them take 2 at a time.

## 2025-05-02 RX ORDER — LEVOTHYROXINE SODIUM 50 UG/1
50 TABLET ORAL DAILY
Qty: 90 TABLET | Refills: 3 | Status: SHIPPED | OUTPATIENT
Start: 2025-05-02

## 2025-05-02 NOTE — TELEPHONE ENCOUNTER
Caller: erwin cornelius    Relationship: Emergency Contact    Best call back number: [phone]    Requested Prescriptions:   Requested Prescriptions     Pending Prescriptions Disp Refills    levothyroxine (SYNTHROID, LEVOTHROID) 50 MCG tablet 90 tablet 1     Sig: Take 1 tablet by mouth Daily.        Pharmacy where request should be sent: [facility] [phone] [facility] [phone] FX     Last office visit with prescribing clinician: 2/5/2025   Last telemedicine visit with prescribing clinician: Visit date not found   Next office visit with prescribing clinician: 5/28/2025     Additional details provided by patient: PHARMACY STATES THIS PRESCRIPTION IS STILL UNDER DR. HO NAME. PATIENT IS HAVING AN ISSUE PICKING THIS UP AT THE PHARMACY DUE TO THIS    Does the patient have less than a 3 day supply:  [x] Yes  [] No    Would you like a call back once the refill request has been completed: [x] Yes [] No    If the office needs to give you a call back, can they leave a voicemail: [x] Yes [] No    Doyle Mathews Rep   05/02/25 13:28 CDT

## 2025-05-13 ENCOUNTER — TELEPHONE (OUTPATIENT)
Dept: INTERNAL MEDICINE | Facility: CLINIC | Age: 89
End: 2025-05-13

## 2025-05-13 NOTE — TELEPHONE ENCOUNTER
Pharmacy Name: [facility] [address] [place] [address] [phone] [facility] [phone] FX     Reference Number (if applicable): [id number]    Pharmacy representative name: FLORINA    Pharmacy representative phone number: [phone]- [place] DEPARTMENT DIRECT LINE    What medication are you calling in regards to: METFORMIN    What question does the pharmacy have: FLORINA FROM [employer] STATES THAT THERE ARE SPECIFIC CLINICAL QUESTIONS REGARDING THE PRIOR AUTHORIZATION THAT NEED TO BE ANSWERED FOR THE METFORMIN. NO FURTHER DETAILS GIVEN.

## 2025-05-14 ENCOUNTER — TELEPHONE (OUTPATIENT)
Age: 89
End: 2025-05-14

## 2025-05-14 NOTE — TELEPHONE ENCOUNTER
Called pt no answer left VM that I had to change his apt from 7/31 to 7/17 at 1:00PM asked pt to please call back to make sure he received this message.  Please DONE this encounter when confirmed.

## 2025-05-28 ENCOUNTER — OFFICE VISIT (OUTPATIENT)
Dept: INTERNAL MEDICINE | Facility: CLINIC | Age: 89
End: 2025-05-28
Payer: MEDICARE

## 2025-05-28 VITALS
HEART RATE: 65 BPM | BODY MASS INDEX: 30.75 KG/M2 | HEIGHT: 74 IN | OXYGEN SATURATION: 97 % | WEIGHT: 239.6 LBS | DIASTOLIC BLOOD PRESSURE: 78 MMHG | TEMPERATURE: 97.3 F | SYSTOLIC BLOOD PRESSURE: 140 MMHG

## 2025-05-28 DIAGNOSIS — E66.811 CLASS 1 OBESITY DUE TO EXCESS CALORIES WITH SERIOUS COMORBIDITY AND BODY MASS INDEX (BMI) OF 30.0 TO 30.9 IN ADULT: ICD-10-CM

## 2025-05-28 DIAGNOSIS — M17.12 ARTHRITIS OF KNEE, LEFT: ICD-10-CM

## 2025-05-28 DIAGNOSIS — Z79.4 TYPE 2 DIABETES MELLITUS WITH DIABETIC NEUROPATHY, WITH LONG-TERM CURRENT USE OF INSULIN: Primary | Chronic | ICD-10-CM

## 2025-05-28 DIAGNOSIS — E11.40 TYPE 2 DIABETES MELLITUS WITH DIABETIC NEUROPATHY, WITH LONG-TERM CURRENT USE OF INSULIN: Primary | Chronic | ICD-10-CM

## 2025-05-28 DIAGNOSIS — E66.09 CLASS 1 OBESITY DUE TO EXCESS CALORIES WITH SERIOUS COMORBIDITY AND BODY MASS INDEX (BMI) OF 30.0 TO 30.9 IN ADULT: ICD-10-CM

## 2025-05-28 DIAGNOSIS — I87.2 VENOUS INSUFFICIENCY: ICD-10-CM

## 2025-05-28 DIAGNOSIS — I10 PRIMARY HYPERTENSION: Chronic | ICD-10-CM

## 2025-05-28 LAB — HBA1C MFR BLD: 7.8 % (ref 4.5–5.7)

## 2025-05-28 RX ORDER — DULAGLUTIDE 4.5 MG/.5ML
0.5 INJECTION, SOLUTION SUBCUTANEOUS WEEKLY
Qty: 2 ML | Refills: 2 | Status: SHIPPED | OUTPATIENT
Start: 2025-05-28

## 2025-05-28 RX ORDER — METFORMIN HYDROCHLORIDE 1000 MG/1
1000 TABLET, FILM COATED, EXTENDED RELEASE ORAL 2 TIMES DAILY WITH MEALS
Qty: 60 TABLET | Refills: 5 | Status: SHIPPED | OUTPATIENT
Start: 2025-05-28

## 2025-05-28 NOTE — PROGRESS NOTES
"      Chief Complaint  Diabetes (A1c: 7.8) and Hypertension    Subjective        Sarkis Naranjo presents to [facility]    HPI    Patient here for the above problems.  See Assessment and Plan for further HPI components.      Review of Systems    Objective   Vital Signs:  /78 (BP Location: Left arm, Patient Position: Sitting, Cuff Size: Adult)   Pulse 65   Temp 97.3 °F (36.3 °C) (Temporal)   Ht 188 cm (74\")   Wt 109 kg (239 lb 9.6 oz)   SpO2 97%   BMI 30.76 kg/m²   Estimated body mass index is 30.76 kg/m² as calculated from the following:    Height as of this encounter: 188 cm (74\").    Weight as of this encounter: 109 kg (239 lb 9.6 oz).      Physical Exam  Vitals and nursing note reviewed.   Constitutional:       Appearance: He is obese. He is not ill-appearing.   Eyes:      General: No scleral icterus.     Conjunctiva/sclera: Conjunctivae normal.   Cardiovascular:      Rate and Rhythm: Normal rate and regular rhythm.   Pulmonary:      Effort: Pulmonary effort is normal. No respiratory distress.   Musculoskeletal:      Right lower leg: Edema present.      Left lower leg: Edema present.   Neurological:      General: No focal deficit present.      Mental Status: He is alert and oriented to person, place, and time.   Psychiatric:         Mood and Affect: Mood normal.         Behavior: Behavior normal.                       Assessment and Plan   Diagnoses and all orders for this visit:    1. Type 2 diabetes mellitus with diabetic neuropathy, with long-term current use of insulin (Primary)  -     POC Glycated Hemoglobin, Total  -     Dulaglutide (Trulicity) 4.5 MG/0.5ML solution auto-injector; Inject 0.5 mL under the skin into the appropriate area as directed 1 (One) Time Per Week.  Dispense: 2 mL; Refill: 2    2. Class 1 obesity due to excess calories with serious comorbidity and body mass index (BMI) of 30.0 to 30.9 in adult  -     Dulaglutide (Trulicity) 4.5 MG/0.5ML solution " auto-injector; Inject 0.5 mL under the skin into the appropriate area as directed 1 (One) Time Per Week.  Dispense: 2 mL; Refill: 2    3. Acquired hypothyroidism    4. Venous insufficiency    5. Arthritis of knee, left    6. Primary hypertension    Other orders  -     metFORMIN (Glumetza) 1000 MG (MOD) 24 hr tablet; Take 1 tablet by mouth 2 (Two) Times a Day With Meals.  Dispense: 60 tablet; Refill: 5        History of Present Illness  The patient is an 89-year-old male who presents today for follow-up on diabetes and hypertension.    He has been consuming strawberries and peaches, which may have impacted his blood glucose levels. His insulin dosage varies between 20 to 28 units, with a recent reduction to 22 units due to a blood glucose reading of 121. This morning, his blood glucose level was 109, and it was 117 the previous day. He has been on Glumetza for the past 5 years, which has been effective in managing his condition. However, he recently switched to a less expensive alternative due to insurance constraints. He is currently taking this medication twice daily. He has expressed interest in using a continuous glucose monitor, such as the Dexcom G7, to better manage his blood glucose levels. He has one remaining dose of Trulicity, which he plans to use on Sunday.    He typically maintains normal blood pressure levels and monitors his blood pressure at home using a cuff.    He is scheduled for a left knee replacement surgery on 08/27/2025 and is seeking advice on whether a preoperative consultation is necessary.    He had his right knee replaced in February 2024. He gets his toenails clipped every 4 months.    PAST SURGICAL HISTORY: Right knee replacement in February 2024.    SOCIAL HISTORY  He does not smoke.      Assessment & Plan  Diabetes Mellitus.  A1c level has increased to 7.8 from 7.7. Blood glucose levels: 24 units of insulin for levels around 121, 25-26 units for levels over 150, and 27-28 units for  levels above 220. Advised to reduce consumption of strawberries and other high-sugar foods. Prescription for a higher dose of Trulicity sent to pharmacy. Order for a Dexcom continuous glucose monitor placed, and insurance coverage to be verified. Increase trulicity.  Switch metformin formulation.  Continue current dose.  Continue long-acting insulin daily, discussed trying to remain consistent with this.  Will order dexcom.  Continue glipizide.  Work on diet.    Hypertension.  Blood pressure is slightly elevated today at 140/78. Advised to monitor blood pressure at home once or twice a week. No changes to current medication regimen are necessary at this time. Monitoring to ensure it does not become a trend.  Continue amlodipine 2.5 mg daily.    Left knee arthritis  Replacement per Dr. Encinas.  Importance of sugar control.      Patient has a BMI > 30.  Obesity increases risks of diseases such as diabetes, coronary artery disease, hypertension, sleep apnea, hyperlipidemia, arthritis, and stroke.  Recommend focusing on portion control and making healthy diet choices.  Avoid fast food.  Avoid calorie beverages including sweet tea, juice, soft drinks, and alcohol.  Recommend increasing your activity and starting a regular exercise program. Can consider utilizing calorie counting apps such as [employer]      Patient has bilateral venous insufficiency.  Patient does not have on compression socks.        Follow-up  The patient will follow up in 3 months.        Result Review :  The following data was reviewed by: Vasu Brown MD on 05/28/2025:  A1C Last 3 Results          10/28/2024    07:35 2/5/2025    13:07 5/28/2025    11:13   HGBA1C Last 3 Results   Hemoglobin A1C 7.8     7.7  7.8       Details          This result is from an external source.                                      Follow Up   Return in about 3 months (around 8/28/2025), or if symptoms worsen or fail to improve, for follow up for above problems.  Longitudinal care..  Patient was given instructions and counseling regarding his condition or for health maintenance advice. Please see specific information pulled into the AVS if appropriate.       NIK Brown MD, FACP, [facility]      Electronically signed by Vasu Brown MD, 05/28/25, 12:36 PM CDT.    Patient or patient representative verbalized consent for the use of Ambient Listening during the visit with  Vasu Brown MD for chart documentation. 5/28/2025  12:39 CDT

## 2025-06-09 RX ORDER — ATENOLOL 50 MG/1
50 TABLET ORAL DAILY
Qty: 90 TABLET | Refills: 3 | Status: SHIPPED | OUTPATIENT
Start: 2025-06-09

## 2025-06-23 ENCOUNTER — TELEPHONE (OUTPATIENT)
Age: 89
End: 2025-06-23

## 2025-06-23 DIAGNOSIS — Z96.651 PRESENCE OF ARTIFICIAL KNEE JOINT, RIGHT: Primary | ICD-10-CM

## 2025-06-23 RX ORDER — AMOXICILLIN 500 MG/1
CAPSULE ORAL
Qty: 4 CAPSULE | Refills: 3 | Status: SHIPPED | OUTPATIENT
Start: 2025-06-23

## 2025-06-23 NOTE — TELEPHONE ENCOUNTER
Pt wife called and said he needs pre med for his dental appt on July 9th needs this sent to [facility]

## 2025-06-23 NOTE — TELEPHONE ENCOUNTER
Pt Damian SANDRA 2/26/2024, called wife back on emergency contacts no answer left VM I will task Dr Kumar to send RX to pharmacy listed in chart.  Pt has NKA

## 2025-07-10 DIAGNOSIS — E11.40 TYPE 2 DIABETES MELLITUS WITH DIABETIC NEUROPATHY, WITHOUT LONG-TERM CURRENT USE OF INSULIN: Chronic | ICD-10-CM

## 2025-07-10 RX ORDER — BLOOD SUGAR DIAGNOSTIC
1 STRIP MISCELLANEOUS 2 TIMES DAILY
Qty: 100 EACH | Refills: 5 | Status: SHIPPED | OUTPATIENT
Start: 2025-07-10

## 2025-07-17 ENCOUNTER — OFFICE VISIT (OUTPATIENT)
Age: 89
End: 2025-07-17
Payer: MEDICARE

## 2025-07-17 VITALS — HEIGHT: 74 IN | BODY MASS INDEX: 29.9 KG/M2 | WEIGHT: 233 LBS

## 2025-07-17 DIAGNOSIS — Z01.818 PRE-OP EVALUATION: ICD-10-CM

## 2025-07-17 DIAGNOSIS — M17.12 PRIMARY OSTEOARTHRITIS OF LEFT KNEE: Primary | ICD-10-CM

## 2025-07-17 DIAGNOSIS — Z29.9 PROPHYLACTIC MEASURE: ICD-10-CM

## 2025-07-17 PROCEDURE — 99215 OFFICE O/P EST HI 40 MIN: CPT | Performed by: ORTHOPAEDIC SURGERY

## 2025-07-17 PROCEDURE — 1159F MED LIST DOCD IN RCRD: CPT | Performed by: ORTHOPAEDIC SURGERY

## 2025-07-17 PROCEDURE — 1123F ACP DISCUSS/DSCN MKR DOCD: CPT | Performed by: ORTHOPAEDIC SURGERY

## 2025-07-17 RX ORDER — MUPIROCIN 2 %
OINTMENT (GRAM) TOPICAL
Qty: 1 EACH | Refills: 0 | Status: SHIPPED | OUTPATIENT
Start: 2025-07-17

## 2025-07-17 ASSESSMENT — PROMIS GLOBAL HEALTH SCALE
IN THE PAST 7 DAYS, HOW WOULD YOU RATE YOUR PAIN ON AVERAGE [ON A SCALE FROM 0 (NO PAIN) TO 10 (WORST IMAGINABLE PAIN)]?: 5
IN GENERAL, PLEASE RATE HOW WELL YOU CARRY OUT YOUR USUAL SOCIAL ACTIVITIES (INCLUDES ACTIVITIES AT HOME, AT WORK, AND IN YOUR COMMUNITY, AND RESPONSIBILITIES AS A PARENT, CHILD, SPOUSE, EMPLOYEE, FRIEND, ETC) [ON A SCALE OF 1 (POOR) TO 5 (EXCELLENT)]?: GOOD
IN THE PAST 7 DAYS, HOW WOULD YOU RATE YOUR FATIGUE ON AVERAGE [ON A SCALE FROM 1 (NONE) TO 5 (VERY SEVERE)]?: MODERATE
IN GENERAL, WOULD YOU SAY YOUR QUALITY OF LIFE IS...[ON A SCALE OF 1 (POOR) TO 5 (EXCELLENT)]: GOOD
TO WHAT EXTENT ARE YOU ABLE TO CARRY OUT YOUR EVERYDAY PHYSICAL ACTIVITIES SUCH AS WALKING, CLIMBING STAIRS, CARRYING GROCERIES, OR MOVING A CHAIR [ON A SCALE OF 1 (NOT AT ALL) TO 5 (COMPLETELY)]?: A LITTLE
WHO IS THE PERSON COMPLETING THE PROMIS V1.1 SURVEY?: SELF
IN THE PAST 7 DAYS, HOW OFTEN HAVE YOU BEEN BOTHERED BY EMOTIONAL PROBLEMS, SUCH AS FEELING ANXIOUS, DEPRESSED, OR IRRITABLE [ON A SCALE FROM 1 (NEVER) TO 5 (ALWAYS)]?: NEVER
SUM OF RESPONSES TO QUESTIONS 3, 6, 7, & 8: 12
IN GENERAL, WOULD YOU SAY YOUR HEALTH IS...[ON A SCALE OF 1 (POOR) TO 5 (EXCELLENT)]: GOOD
IN GENERAL, HOW WOULD YOU RATE YOUR MENTAL HEALTH, INCLUDING YOUR MOOD AND YOUR ABILITY TO THINK [ON A SCALE OF 1 (POOR) TO 5 (EXCELLENT)]?: GOOD
SUM OF RESPONSES TO QUESTIONS 2, 4, 5, & 10: 14
HOW IS THE PROMIS V1.1 BEING ADMINISTERED?: PAPER
IN GENERAL, HOW WOULD YOU RATE YOUR SATISFACTION WITH YOUR SOCIAL ACTIVITIES AND RELATIONSHIPS [ON A SCALE OF 1 (POOR) TO 5 (EXCELLENT)]?: GOOD
IN GENERAL, HOW WOULD YOU RATE YOUR PHYSICAL HEALTH [ON A SCALE OF 1 (POOR) TO 5 (EXCELLENT)]?: FAIR

## 2025-07-17 ASSESSMENT — KOOS JR
STANDING UPRIGHT: MODERATE
GOING UP OR DOWN STAIRS: SEVERE
KOOS JR TOTAL INTERVAL SCORE: 54.84
HOW SEVERE IS YOUR KNEE STIFFNESS AFTER FIRST WAKING IN MORNING: MODERATE
STRAIGHTENING KNEE FULLY: MODERATE
TWISING OR PIVOTING ON KNEE: MODERATE
RISING FROM SITTING: MODERATE

## 2025-07-17 NOTE — PROGRESS NOTES
[facility]  [facility]  [address]  [facility] [id number] [phone]     Patient: Tanmay Gilmore   YOB: 1935   Date: 7/17/2025     Chief Complaint   Patient presents with    Pre-op Exam     Left TKR 8/27/2025       History of Present Illness  This is a 89 y.o. male  presents today preop talk for a left total knee replacement.     History of Present Illness  Pertinent medical history:  1.  History of bladder cancer in remission for 5 years with chronic Willett catheter  2.  Indwelling catheter because of bladder dysfunction that is changed once a month by Dr. Medina with urologist in [place].  Has had no infection but has chronic indwelling catheter  3.  History of stent placement by cardiology 11/7/2023 he is cleared from a cardiac standpoint but should remain on aspirin throughout the perioperative period.  His aspirin       The patient has failed all nonsurgical treatments including intra-articular injections for a period of several months at which time they are no longer beneficial.  Other failed conservative treatment includes nonsteroidal medications, physical therapy, weight loss, and orthotics.  The patient's weight is not a factor in their disability and due to the advancement of the patient's arthritis and pain, they cannot participate in physical therapy.  The patient rates the pain as unbearable and affecting all activities of daily living.  Because of the persistent pain and limitations from the affected joint, the patient has also had safety issues.  All conservative treatment has failed and the patient now presents for preoperative surgical evaluation.    Past Medical History:   Diagnosis Date    Arthritis     Cancer (HCC)     bladder tumor no tumor    Diabetes mellitus (HCC)     Hyperlipidemia     Hypertension     Indwelling catheter present on admission     Irregular heart rhythm     Thyroid disease

## 2025-08-06 ENCOUNTER — HOSPITAL ENCOUNTER (OUTPATIENT)
Dept: PREADMISSION TESTING | Age: 89
Discharge: HOME | End: 2025-08-10
Payer: MEDICARE

## 2025-08-06 ENCOUNTER — TELEPHONE (OUTPATIENT)
Age: 89
End: 2025-08-06

## 2025-08-06 VITALS — WEIGHT: 235 LBS | BODY MASS INDEX: 30.16 KG/M2 | HEIGHT: 74 IN

## 2025-08-06 DIAGNOSIS — M17.12 PRIMARY OSTEOARTHRITIS OF LEFT KNEE: ICD-10-CM

## 2025-08-06 LAB
ALBUMIN SERPL-MCNC: 4.1 G/DL (ref 3.5–5.2)
ALP SERPL-CCNC: 65 U/L (ref 40–129)
ALT SERPL-CCNC: 22 U/L (ref 10–50)
ANION GAP SERPL CALCULATED.3IONS-SCNC: 12 MMOL/L (ref 8–16)
APTT PPP: 27.9 SEC (ref 26–36.2)
AST SERPL-CCNC: 21 U/L (ref 10–50)
BACTERIA #/AREA URNS HPF: ABNORMAL /HPF
BASOPHILS # BLD: 0.1 K/UL (ref 0–0.2)
BASOPHILS NFR BLD: 1 % (ref 0–1)
BILIRUB SERPL-MCNC: 0.7 MG/DL (ref 0.2–1.2)
BILIRUB UR STRIP.AUTO-MCNC: NEGATIVE MG/DL
BUN SERPL-MCNC: 15 MG/DL (ref 8–23)
CALCIUM SERPL-MCNC: 9.9 MG/DL (ref 8.8–10.2)
CHLORIDE SERPL-SCNC: 100 MMOL/L (ref 98–107)
CLARITY UR: CLEAR
CO2 SERPL-SCNC: 25 MMOL/L (ref 22–29)
COLOR UR: YELLOW
CREAT SERPL-MCNC: 0.9 MG/DL (ref 0.7–1.2)
EOSINOPHIL # BLD: 0.2 K/UL (ref 0–0.6)
EOSINOPHIL NFR BLD: 2.7 % (ref 0–5)
ERYTHROCYTE [DISTWIDTH] IN BLOOD BY AUTOMATED COUNT: 14 % (ref 11.5–14.5)
GLUCOSE SERPL-MCNC: 133 MG/DL (ref 70–99)
GLUCOSE UR STRIP.AUTO-MCNC: NEGATIVE MG/DL
HBA1C MFR BLD: 7.7 % (ref 4–5.6)
HCT VFR BLD AUTO: 41.2 % (ref 42–52)
HGB BLD-MCNC: 13.9 G/DL (ref 14–18)
HGB UR STRIP.AUTO-MCNC: ABNORMAL MG/L
IMM GRANULOCYTES # BLD: 0 K/UL
INR PPP: 1.06 (ref 0.88–1.18)
KETONES UR STRIP.AUTO-MCNC: NEGATIVE MG/DL
LEUKOCYTE ESTERASE UR QL STRIP.AUTO: ABNORMAL
LYMPHOCYTES # BLD: 2.3 K/UL (ref 1.1–4.5)
LYMPHOCYTES NFR BLD: 33.6 % (ref 20–40)
MCH RBC QN AUTO: 29.8 PG (ref 27–31)
MCHC RBC AUTO-ENTMCNC: 33.7 G/DL (ref 33–37)
MCV RBC AUTO: 88.4 FL (ref 80–94)
MONOCYTES # BLD: 0.9 K/UL (ref 0–0.9)
MONOCYTES NFR BLD: 13.7 % (ref 0–10)
MRSA DNA SPEC QL NAA+PROBE: NOT DETECTED
NEUTROPHILS # BLD: 3.3 K/UL (ref 1.5–7.5)
NEUTS SEG NFR BLD: 48.6 % (ref 50–65)
NITRITE UR QL STRIP.AUTO: POSITIVE
PH UR STRIP.AUTO: 6 [PH] (ref 5–8)
PLATELET # BLD AUTO: 261 K/UL (ref 130–400)
PMV BLD AUTO: 9.6 FL (ref 9.4–12.4)
POTASSIUM SERPL-SCNC: 4.2 MMOL/L (ref 3.5–5.1)
PROT SERPL-MCNC: 6.8 G/DL (ref 6.4–8.3)
PROT UR STRIP.AUTO-MCNC: ABNORMAL MG/DL
PROTHROMBIN TIME: 13.7 SEC (ref 12–14.6)
RBC # BLD AUTO: 4.66 M/UL (ref 4.7–6.1)
RBC #/AREA URNS HPF: ABNORMAL /HPF (ref 0–2)
SODIUM SERPL-SCNC: 137 MMOL/L (ref 136–145)
SP GR UR STRIP.AUTO: 1.02 (ref 1–1.03)
SQUAMOUS #/AREA URNS HPF: ABNORMAL /HPF
UROBILINOGEN UR STRIP.AUTO-MCNC: 0.2 E.U./DL
WBC # BLD AUTO: 6.7 K/UL (ref 4.8–10.8)
WBC #/AREA URNS HPF: ABNORMAL /HPF (ref 0–5)

## 2025-08-06 PROCEDURE — 93005 ELECTROCARDIOGRAM TRACING: CPT

## 2025-08-06 PROCEDURE — 80053 COMPREHEN METABOLIC PANEL: CPT

## 2025-08-06 PROCEDURE — 85610 PROTHROMBIN TIME: CPT

## 2025-08-06 PROCEDURE — 87186 SC STD MICRODIL/AGAR DIL: CPT

## 2025-08-06 PROCEDURE — 83036 HEMOGLOBIN GLYCOSYLATED A1C: CPT

## 2025-08-06 PROCEDURE — 85730 THROMBOPLASTIN TIME PARTIAL: CPT

## 2025-08-06 PROCEDURE — 81001 URINALYSIS AUTO W/SCOPE: CPT

## 2025-08-06 PROCEDURE — 87641 MR-STAPH DNA AMP PROBE: CPT

## 2025-08-06 PROCEDURE — 87086 URINE CULTURE/COLONY COUNT: CPT

## 2025-08-06 PROCEDURE — 85025 COMPLETE CBC W/AUTO DIFF WBC: CPT

## 2025-08-06 PROCEDURE — 87077 CULTURE AEROBIC IDENTIFY: CPT

## 2025-08-06 RX ORDER — ACETAMINOPHEN 160 MG
2000 TABLET,DISINTEGRATING ORAL DAILY
COMMUNITY

## 2025-08-08 DIAGNOSIS — A49.9 UTI (URINARY TRACT INFECTION), BACTERIAL: Primary | ICD-10-CM

## 2025-08-08 DIAGNOSIS — N39.0 UTI (URINARY TRACT INFECTION), BACTERIAL: Primary | ICD-10-CM

## 2025-08-08 LAB
BACTERIA UR CULT: ABNORMAL
ORGANISM: ABNORMAL

## 2025-08-08 RX ORDER — NITROFURANTOIN 25; 75 MG/1; MG/1
100 CAPSULE ORAL 2 TIMES DAILY
Qty: 10 CAPSULE | Refills: 0 | Status: SHIPPED | OUTPATIENT
Start: 2025-08-08 | End: 2025-08-13

## 2025-08-12 LAB
EKG P AXIS: 65 DEGREES
EKG P-R INTERVAL: 314 MS
EKG Q-T INTERVAL: 360 MS
EKG QRS DURATION: 88 MS
EKG QTC CALCULATION (BAZETT): 368 MS
EKG T AXIS: 76 DEGREES

## 2025-08-13 DIAGNOSIS — M17.12 PRIMARY OSTEOARTHRITIS OF LEFT KNEE: Primary | ICD-10-CM

## 2025-08-21 ENCOUNTER — OFFICE VISIT (OUTPATIENT)
Dept: INTERNAL MEDICINE | Facility: CLINIC | Age: 89
End: 2025-08-21
Payer: MEDICARE

## 2025-08-21 VITALS
SYSTOLIC BLOOD PRESSURE: 118 MMHG | BODY MASS INDEX: 29.9 KG/M2 | OXYGEN SATURATION: 97 % | DIASTOLIC BLOOD PRESSURE: 66 MMHG | HEIGHT: 74 IN | TEMPERATURE: 98.4 F | WEIGHT: 233 LBS | HEART RATE: 50 BPM

## 2025-08-21 DIAGNOSIS — E11.40 TYPE 2 DIABETES MELLITUS WITH DIABETIC NEUROPATHY, WITH LONG-TERM CURRENT USE OF INSULIN: Chronic | ICD-10-CM

## 2025-08-21 DIAGNOSIS — Z79.4 TYPE 2 DIABETES MELLITUS WITH DIABETIC NEUROPATHY, WITH LONG-TERM CURRENT USE OF INSULIN: Chronic | ICD-10-CM

## 2025-08-21 DIAGNOSIS — I10 PRIMARY HYPERTENSION: Primary | Chronic | ICD-10-CM

## 2025-08-21 DIAGNOSIS — M17.11 ARTHRITIS OF RIGHT KNEE: ICD-10-CM

## 2025-08-21 DIAGNOSIS — G62.9 NEUROPATHY: ICD-10-CM

## 2025-08-21 DIAGNOSIS — E66.3 OVERWEIGHT (BMI 25.0-29.9): ICD-10-CM

## 2025-08-21 PROBLEM — E66.811 CLASS 1 OBESITY DUE TO EXCESS CALORIES WITH SERIOUS COMORBIDITY AND BODY MASS INDEX (BMI) OF 30.0 TO 30.9 IN ADULT: Status: RESOLVED | Noted: 2024-08-26 | Resolved: 2025-08-21

## 2025-08-21 PROBLEM — E66.09 CLASS 1 OBESITY DUE TO EXCESS CALORIES WITH SERIOUS COMORBIDITY AND BODY MASS INDEX (BMI) OF 30.0 TO 30.9 IN ADULT: Status: RESOLVED | Noted: 2024-08-26 | Resolved: 2025-08-21

## 2025-08-27 ENCOUNTER — APPOINTMENT (OUTPATIENT)
Dept: GENERAL RADIOLOGY | Age: 89
End: 2025-08-27
Attending: ORTHOPAEDIC SURGERY
Payer: MEDICARE

## 2025-08-27 ENCOUNTER — ANESTHESIA EVENT (OUTPATIENT)
Dept: OPERATING ROOM | Age: 89
End: 2025-08-27
Payer: MEDICARE

## 2025-08-27 ENCOUNTER — HOSPITAL ENCOUNTER (OUTPATIENT)
Age: 89
Setting detail: OBSERVATION
Discharge: HOME | End: 2025-08-29
Attending: ORTHOPAEDIC SURGERY | Admitting: ORTHOPAEDIC SURGERY
Payer: MEDICARE

## 2025-08-27 ENCOUNTER — ANESTHESIA (OUTPATIENT)
Dept: OPERATING ROOM | Age: 89
End: 2025-08-27
Payer: MEDICARE

## 2025-08-27 DIAGNOSIS — M17.12 PRIMARY OSTEOARTHRITIS OF LEFT KNEE: ICD-10-CM

## 2025-08-27 DIAGNOSIS — M17.10 ARTHRITIS OF KNEE: Primary | ICD-10-CM

## 2025-08-27 LAB
GLUCOSE BLD-MCNC: 140 MG/DL (ref 70–99)
GLUCOSE BLD-MCNC: 159 MG/DL (ref 70–99)
GLUCOSE BLD-MCNC: 187 MG/DL (ref 70–99)
GLUCOSE BLD-MCNC: 196 MG/DL (ref 70–99)
GLUCOSE BLD-MCNC: 340 MG/DL (ref 70–99)
PERFORMED ON: ABNORMAL

## 2025-08-27 PROCEDURE — 2580000003 HC RX 258: Performed by: ORTHOPAEDIC SURGERY

## 2025-08-27 PROCEDURE — C1776 JOINT DEVICE (IMPLANTABLE): HCPCS | Performed by: ORTHOPAEDIC SURGERY

## 2025-08-27 PROCEDURE — 94760 N-INVAS EAR/PLS OXIMETRY 1: CPT

## 2025-08-27 PROCEDURE — 6370000000 HC RX 637 (ALT 250 FOR IP): Performed by: ORTHOPAEDIC SURGERY

## 2025-08-27 PROCEDURE — 3600000005 HC SURGERY LEVEL 5 BASE: Performed by: ORTHOPAEDIC SURGERY

## 2025-08-27 PROCEDURE — 2500000003 HC RX 250 WO HCPCS: Performed by: ORTHOPAEDIC SURGERY

## 2025-08-27 PROCEDURE — G0378 HOSPITAL OBSERVATION PER HR: HCPCS

## 2025-08-27 PROCEDURE — 82962 GLUCOSE BLOOD TEST: CPT

## 2025-08-27 PROCEDURE — 2500000003 HC RX 250 WO HCPCS: Performed by: NURSE ANESTHETIST, CERTIFIED REGISTERED

## 2025-08-27 PROCEDURE — 2580000003 HC RX 258: Performed by: ANESTHESIOLOGY

## 2025-08-27 PROCEDURE — 27447 TOTAL KNEE ARTHROPLASTY: CPT | Performed by: ORTHOPAEDIC SURGERY

## 2025-08-27 PROCEDURE — 6360000002 HC RX W HCPCS: Performed by: NURSE ANESTHETIST, CERTIFIED REGISTERED

## 2025-08-27 PROCEDURE — 20985 CPTR-ASST DIR MS PX: CPT | Performed by: ORTHOPAEDIC SURGERY

## 2025-08-27 PROCEDURE — 94150 VITAL CAPACITY TEST: CPT

## 2025-08-27 PROCEDURE — 7100000000 HC PACU RECOVERY - FIRST 15 MIN: Performed by: ORTHOPAEDIC SURGERY

## 2025-08-27 PROCEDURE — 2580000003 HC RX 258: Performed by: NURSE ANESTHETIST, CERTIFIED REGISTERED

## 2025-08-27 PROCEDURE — 6360000002 HC RX W HCPCS: Performed by: ANESTHESIOLOGY

## 2025-08-27 PROCEDURE — 6360000002 HC RX W HCPCS: Performed by: ORTHOPAEDIC SURGERY

## 2025-08-27 PROCEDURE — 3600000015 HC SURGERY LEVEL 5 ADDTL 15MIN: Performed by: ORTHOPAEDIC SURGERY

## 2025-08-27 PROCEDURE — 3700000000 HC ANESTHESIA ATTENDED CARE: Performed by: ORTHOPAEDIC SURGERY

## 2025-08-27 PROCEDURE — 64447 NJX AA&/STRD FEMORAL NRV IMG: CPT | Performed by: NURSE ANESTHETIST, CERTIFIED REGISTERED

## 2025-08-27 PROCEDURE — 3700000001 HC ADD 15 MINUTES (ANESTHESIA): Performed by: ORTHOPAEDIC SURGERY

## 2025-08-27 PROCEDURE — 2720000010 HC SURG SUPPLY STERILE: Performed by: ORTHOPAEDIC SURGERY

## 2025-08-27 PROCEDURE — 2500000003 HC RX 250 WO HCPCS: Performed by: ANESTHESIOLOGY

## 2025-08-27 PROCEDURE — 73560 X-RAY EXAM OF KNEE 1 OR 2: CPT

## 2025-08-27 PROCEDURE — 2709999900 HC NON-CHARGEABLE SUPPLY: Performed by: ORTHOPAEDIC SURGERY

## 2025-08-27 PROCEDURE — 7100000001 HC PACU RECOVERY - ADDTL 15 MIN: Performed by: ORTHOPAEDIC SURGERY

## 2025-08-27 DEVICE — BASEPLATE TIB KEELED 0 DEG F LT KNEE SPIKE OSSEOTI PERSONA: Type: IMPLANTABLE DEVICE | Site: KNEE | Status: FUNCTIONAL

## 2025-08-27 DEVICE — PSN MC VE ASF L 10MM 8-11 EF: Type: IMPLANTABLE DEVICE | Site: KNEE | Status: FUNCTIONAL

## 2025-08-27 DEVICE — IMPLANTABLE DEVICE: Type: IMPLANTABLE DEVICE | Site: KNEE | Status: FUNCTIONAL

## 2025-08-27 RX ORDER — TRANEXAMIC ACID 100 MG/ML
INJECTION, SOLUTION INTRAVENOUS
Status: DISCONTINUED | OUTPATIENT
Start: 2025-08-27 | End: 2025-08-27 | Stop reason: SDUPTHER

## 2025-08-27 RX ORDER — DEXTROSE MONOHYDRATE 100 MG/ML
INJECTION, SOLUTION INTRAVENOUS CONTINUOUS PRN
Status: DISCONTINUED | OUTPATIENT
Start: 2025-08-27 | End: 2025-08-29 | Stop reason: HOSPADM

## 2025-08-27 RX ORDER — OXYCODONE HYDROCHLORIDE 5 MG/1
5 TABLET ORAL SEE ADMIN INSTRUCTIONS
Qty: 90 TABLET | Refills: 0 | Status: SHIPPED | OUTPATIENT
Start: 2025-08-27 | End: 2025-09-27

## 2025-08-27 RX ORDER — HYDROMORPHONE HYDROCHLORIDE 1 MG/ML
0.5 INJECTION, SOLUTION INTRAMUSCULAR; INTRAVENOUS; SUBCUTANEOUS EVERY 5 MIN PRN
Status: DISCONTINUED | OUTPATIENT
Start: 2025-08-27 | End: 2025-08-27 | Stop reason: HOSPADM

## 2025-08-27 RX ORDER — SODIUM CHLORIDE 0.9 % (FLUSH) 0.9 %
5-40 SYRINGE (ML) INJECTION EVERY 12 HOURS SCHEDULED
Status: DISCONTINUED | OUTPATIENT
Start: 2025-08-27 | End: 2025-08-27 | Stop reason: HOSPADM

## 2025-08-27 RX ORDER — CALCIUM CARBONATE/VITAMIN D3 600MG-62.5
1 CAPSULE ORAL DAILY
Status: DISCONTINUED | OUTPATIENT
Start: 2025-08-27 | End: 2025-08-27 | Stop reason: CLARIF

## 2025-08-27 RX ORDER — CYCLOBENZAPRINE HCL 10 MG
5 TABLET ORAL 3 TIMES DAILY PRN
Qty: 20 TABLET | Refills: 0 | Status: SHIPPED | OUTPATIENT
Start: 2025-08-27 | End: 2025-09-27

## 2025-08-27 RX ORDER — CELECOXIB 200 MG/1
200 CAPSULE ORAL ONCE
Status: COMPLETED | OUTPATIENT
Start: 2025-08-27 | End: 2025-08-27

## 2025-08-27 RX ORDER — FENTANYL CITRATE 50 UG/ML
INJECTION, SOLUTION INTRAMUSCULAR; INTRAVENOUS
Status: DISCONTINUED | OUTPATIENT
Start: 2025-08-27 | End: 2025-08-27 | Stop reason: SDUPTHER

## 2025-08-27 RX ORDER — GLIPIZIDE 10 MG/1
10 TABLET ORAL 2 TIMES DAILY PRN
Status: DISCONTINUED | OUTPATIENT
Start: 2025-08-27 | End: 2025-08-29 | Stop reason: HOSPADM

## 2025-08-27 RX ORDER — ASPIRIN 81 MG/1
81 TABLET ORAL 2 TIMES DAILY
Status: DISCONTINUED | OUTPATIENT
Start: 2025-08-27 | End: 2025-08-29 | Stop reason: HOSPADM

## 2025-08-27 RX ORDER — POLYETHYLENE GLYCOL 3350 17 G/17G
17 POWDER, FOR SOLUTION ORAL DAILY
Status: DISCONTINUED | OUTPATIENT
Start: 2025-08-27 | End: 2025-08-28 | Stop reason: SDUPTHER

## 2025-08-27 RX ORDER — GLUCAGON 1 MG/ML
1 KIT INJECTION PRN
Status: DISCONTINUED | OUTPATIENT
Start: 2025-08-27 | End: 2025-08-28

## 2025-08-27 RX ORDER — VITAMIN B COMPLEX
2000 TABLET ORAL DAILY
Status: DISCONTINUED | OUTPATIENT
Start: 2025-08-27 | End: 2025-08-29 | Stop reason: HOSPADM

## 2025-08-27 RX ORDER — OXYCODONE HYDROCHLORIDE 10 MG/1
10 TABLET ORAL EVERY 4 HOURS PRN
Status: DISCONTINUED | OUTPATIENT
Start: 2025-08-27 | End: 2025-08-29 | Stop reason: HOSPADM

## 2025-08-27 RX ORDER — TRANEXAMIC ACID 650 MG/1
1300 TABLET ORAL 2 TIMES DAILY
Qty: 24 TABLET | Refills: 0 | Status: SHIPPED | OUTPATIENT
Start: 2025-08-27 | End: 2025-09-02

## 2025-08-27 RX ORDER — DIPHENHYDRAMINE HYDROCHLORIDE 50 MG/ML
25 INJECTION, SOLUTION INTRAMUSCULAR; INTRAVENOUS EVERY 6 HOURS PRN
Status: DISCONTINUED | OUTPATIENT
Start: 2025-08-27 | End: 2025-08-29 | Stop reason: HOSPADM

## 2025-08-27 RX ORDER — SODIUM CHLORIDE, SODIUM LACTATE, POTASSIUM CHLORIDE, CALCIUM CHLORIDE 600; 310; 30; 20 MG/100ML; MG/100ML; MG/100ML; MG/100ML
INJECTION, SOLUTION INTRAVENOUS CONTINUOUS
Status: DISCONTINUED | OUTPATIENT
Start: 2025-08-27 | End: 2025-08-27 | Stop reason: HOSPADM

## 2025-08-27 RX ORDER — SODIUM CHLORIDE 0.9 % (FLUSH) 0.9 %
5-40 SYRINGE (ML) INJECTION PRN
Status: DISCONTINUED | OUTPATIENT
Start: 2025-08-27 | End: 2025-08-27 | Stop reason: HOSPADM

## 2025-08-27 RX ORDER — PREGABALIN 50 MG/1
50 CAPSULE ORAL 2 TIMES DAILY
Status: DISCONTINUED | OUTPATIENT
Start: 2025-08-27 | End: 2025-08-29 | Stop reason: HOSPADM

## 2025-08-27 RX ORDER — FENTANYL CITRATE 50 UG/ML
100 INJECTION, SOLUTION INTRAMUSCULAR; INTRAVENOUS ONCE
Refills: 0 | Status: COMPLETED | OUTPATIENT
Start: 2025-08-27 | End: 2025-08-27

## 2025-08-27 RX ORDER — DEXAMETHASONE SODIUM PHOSPHATE 10 MG/ML
INJECTION, SOLUTION INTRAMUSCULAR; INTRAVENOUS
Status: DISCONTINUED | OUTPATIENT
Start: 2025-08-27 | End: 2025-08-27 | Stop reason: SDUPTHER

## 2025-08-27 RX ORDER — TAMSULOSIN HYDROCHLORIDE 0.4 MG/1
0.4 CAPSULE ORAL 2 TIMES DAILY
Status: DISCONTINUED | OUTPATIENT
Start: 2025-08-27 | End: 2025-08-29 | Stop reason: HOSPADM

## 2025-08-27 RX ORDER — SODIUM CHLORIDE 0.9 % (FLUSH) 0.9 %
5-40 SYRINGE (ML) INJECTION EVERY 12 HOURS SCHEDULED
Status: DISCONTINUED | OUTPATIENT
Start: 2025-08-27 | End: 2025-08-29 | Stop reason: HOSPADM

## 2025-08-27 RX ORDER — LIDOCAINE HYDROCHLORIDE 10 MG/ML
INJECTION, SOLUTION INFILTRATION; PERINEURAL
Status: DISCONTINUED | OUTPATIENT
Start: 2025-08-27 | End: 2025-08-27 | Stop reason: SDUPTHER

## 2025-08-27 RX ORDER — SODIUM CHLORIDE 9 MG/ML
INJECTION, SOLUTION INTRAVENOUS PRN
Status: DISCONTINUED | OUTPATIENT
Start: 2025-08-27 | End: 2025-08-27 | Stop reason: HOSPADM

## 2025-08-27 RX ORDER — AMPICILLIN TRIHYDRATE 250 MG
1000 CAPSULE ORAL DAILY
Status: DISCONTINUED | OUTPATIENT
Start: 2025-08-27 | End: 2025-08-27

## 2025-08-27 RX ORDER — DEXAMETHASONE SODIUM PHOSPHATE 10 MG/ML
10 INJECTION, SOLUTION INTRAMUSCULAR; INTRAVENOUS ONCE
Status: DISCONTINUED | OUTPATIENT
Start: 2025-08-27 | End: 2025-08-27 | Stop reason: HOSPADM

## 2025-08-27 RX ORDER — HYDROMORPHONE HYDROCHLORIDE 1 MG/ML
1 INJECTION, SOLUTION INTRAMUSCULAR; INTRAVENOUS; SUBCUTANEOUS
Status: DISCONTINUED | OUTPATIENT
Start: 2025-08-27 | End: 2025-08-29 | Stop reason: HOSPADM

## 2025-08-27 RX ORDER — BISACODYL 5 MG/1
5 TABLET, DELAYED RELEASE ORAL DAILY
Status: DISCONTINUED | OUTPATIENT
Start: 2025-08-27 | End: 2025-08-29 | Stop reason: HOSPADM

## 2025-08-27 RX ORDER — BUPIVACAINE HYDROCHLORIDE 2.5 MG/ML
INJECTION, SOLUTION INFILTRATION; PERINEURAL PRN
Status: DISCONTINUED | OUTPATIENT
Start: 2025-08-27 | End: 2025-08-27 | Stop reason: HOSPADM

## 2025-08-27 RX ORDER — ONDANSETRON 2 MG/ML
INJECTION INTRAMUSCULAR; INTRAVENOUS
Status: DISCONTINUED | OUTPATIENT
Start: 2025-08-27 | End: 2025-08-27 | Stop reason: SDUPTHER

## 2025-08-27 RX ORDER — SODIUM CHLORIDE 9 MG/ML
INJECTION, SOLUTION INTRAVENOUS CONTINUOUS
Status: DISCONTINUED | OUTPATIENT
Start: 2025-08-27 | End: 2025-08-29 | Stop reason: HOSPADM

## 2025-08-27 RX ORDER — ACETAMINOPHEN 500 MG
1000 TABLET ORAL ONCE
Status: COMPLETED | OUTPATIENT
Start: 2025-08-27 | End: 2025-08-27

## 2025-08-27 RX ORDER — DIPHENHYDRAMINE HCL 25 MG
25 TABLET ORAL EVERY 6 HOURS PRN
Status: DISCONTINUED | OUTPATIENT
Start: 2025-08-27 | End: 2025-08-29 | Stop reason: HOSPADM

## 2025-08-27 RX ORDER — CALCIUM CARBONATE 500(1250)
500 TABLET ORAL DAILY
Status: DISCONTINUED | OUTPATIENT
Start: 2025-08-27 | End: 2025-08-29 | Stop reason: HOSPADM

## 2025-08-27 RX ORDER — ROPIVACAINE HYDROCHLORIDE 5 MG/ML
30 INJECTION, SOLUTION EPIDURAL; INFILTRATION; PERINEURAL ONCE
Status: DISCONTINUED | OUTPATIENT
Start: 2025-08-27 | End: 2025-08-27 | Stop reason: HOSPADM

## 2025-08-27 RX ORDER — ONDANSETRON 4 MG/1
4 TABLET, FILM COATED ORAL EVERY 8 HOURS PRN
Status: DISCONTINUED | OUTPATIENT
Start: 2025-08-27 | End: 2025-08-27 | Stop reason: SDUPTHER

## 2025-08-27 RX ORDER — ACETAMINOPHEN 325 MG/1
650 TABLET ORAL EVERY 6 HOURS
Status: DISCONTINUED | OUTPATIENT
Start: 2025-08-27 | End: 2025-08-29 | Stop reason: HOSPADM

## 2025-08-27 RX ORDER — OXYCODONE HCL 10 MG/1
10 TABLET, FILM COATED, EXTENDED RELEASE ORAL ONCE
Status: COMPLETED | OUTPATIENT
Start: 2025-08-27 | End: 2025-08-27

## 2025-08-27 RX ORDER — TRANEXAMIC ACID 650 MG/1
1300 TABLET ORAL 2 TIMES DAILY
Status: DISCONTINUED | OUTPATIENT
Start: 2025-08-27 | End: 2025-08-29 | Stop reason: HOSPADM

## 2025-08-27 RX ORDER — PROPOFOL 10 MG/ML
INJECTION, EMULSION INTRAVENOUS
Status: DISCONTINUED | OUTPATIENT
Start: 2025-08-27 | End: 2025-08-27 | Stop reason: SDUPTHER

## 2025-08-27 RX ORDER — SODIUM CHLORIDE 0.9 % (FLUSH) 0.9 %
5-40 SYRINGE (ML) INJECTION PRN
Status: DISCONTINUED | OUTPATIENT
Start: 2025-08-27 | End: 2025-08-29 | Stop reason: HOSPADM

## 2025-08-27 RX ORDER — ONDANSETRON 4 MG/1
4 TABLET, ORALLY DISINTEGRATING ORAL EVERY 8 HOURS PRN
Status: DISCONTINUED | OUTPATIENT
Start: 2025-08-27 | End: 2025-08-29 | Stop reason: HOSPADM

## 2025-08-27 RX ORDER — LEVOTHYROXINE SODIUM 50 UG/1
50 TABLET ORAL DAILY
Status: DISCONTINUED | OUTPATIENT
Start: 2025-08-27 | End: 2025-08-29 | Stop reason: HOSPADM

## 2025-08-27 RX ORDER — HYDROMORPHONE HYDROCHLORIDE 1 MG/ML
0.25 INJECTION, SOLUTION INTRAMUSCULAR; INTRAVENOUS; SUBCUTANEOUS EVERY 5 MIN PRN
Status: DISCONTINUED | OUTPATIENT
Start: 2025-08-27 | End: 2025-08-27 | Stop reason: HOSPADM

## 2025-08-27 RX ORDER — HYDROMORPHONE HYDROCHLORIDE 1 MG/ML
0.5 INJECTION, SOLUTION INTRAMUSCULAR; INTRAVENOUS; SUBCUTANEOUS
Status: DISCONTINUED | OUTPATIENT
Start: 2025-08-27 | End: 2025-08-29 | Stop reason: HOSPADM

## 2025-08-27 RX ORDER — ONDANSETRON 4 MG/1
4 TABLET, FILM COATED ORAL EVERY 8 HOURS PRN
Qty: 30 TABLET | Refills: 0 | Status: SHIPPED | OUTPATIENT
Start: 2025-08-27

## 2025-08-27 RX ORDER — 0.9 % SODIUM CHLORIDE 0.9 %
500 INTRAVENOUS SOLUTION INTRAVENOUS PRN
Status: DISCONTINUED | OUTPATIENT
Start: 2025-08-27 | End: 2025-08-29 | Stop reason: HOSPADM

## 2025-08-27 RX ORDER — OXYCODONE HYDROCHLORIDE 5 MG/1
5 TABLET ORAL EVERY 4 HOURS PRN
Status: DISCONTINUED | OUTPATIENT
Start: 2025-08-27 | End: 2025-08-29 | Stop reason: HOSPADM

## 2025-08-27 RX ORDER — PREGABALIN 75 MG/1
75 CAPSULE ORAL ONCE
Status: COMPLETED | OUTPATIENT
Start: 2025-08-27 | End: 2025-08-27

## 2025-08-27 RX ORDER — ASPIRIN 81 MG/1
81 TABLET, CHEWABLE ORAL DAILY
Status: DISCONTINUED | OUTPATIENT
Start: 2025-08-27 | End: 2025-08-27 | Stop reason: ALTCHOICE

## 2025-08-27 RX ORDER — ONDANSETRON 2 MG/ML
4 INJECTION INTRAMUSCULAR; INTRAVENOUS EVERY 6 HOURS PRN
Status: DISCONTINUED | OUTPATIENT
Start: 2025-08-27 | End: 2025-08-29 | Stop reason: HOSPADM

## 2025-08-27 RX ORDER — ROPIVACAINE HYDROCHLORIDE 5 MG/ML
INJECTION, SOLUTION EPIDURAL; INFILTRATION; PERINEURAL
Status: COMPLETED | OUTPATIENT
Start: 2025-08-27 | End: 2025-08-27

## 2025-08-27 RX ORDER — ATORVASTATIN CALCIUM 20 MG/1
20 TABLET, FILM COATED ORAL NIGHTLY
Status: DISCONTINUED | OUTPATIENT
Start: 2025-08-27 | End: 2025-08-29 | Stop reason: HOSPADM

## 2025-08-27 RX ORDER — SODIUM CHLORIDE, SODIUM LACTATE, POTASSIUM CHLORIDE, CALCIUM CHLORIDE 600; 310; 30; 20 MG/100ML; MG/100ML; MG/100ML; MG/100ML
INJECTION, SOLUTION INTRAVENOUS
Status: DISCONTINUED | OUTPATIENT
Start: 2025-08-27 | End: 2025-08-27 | Stop reason: SDUPTHER

## 2025-08-27 RX ORDER — INSULIN GLARGINE 100 [IU]/ML
25 INJECTION, SOLUTION SUBCUTANEOUS NIGHTLY
Status: DISCONTINUED | OUTPATIENT
Start: 2025-08-27 | End: 2025-08-29 | Stop reason: HOSPADM

## 2025-08-27 RX ORDER — ROCURONIUM BROMIDE 10 MG/ML
INJECTION, SOLUTION INTRAVENOUS
Status: DISCONTINUED | OUTPATIENT
Start: 2025-08-27 | End: 2025-08-27 | Stop reason: SDUPTHER

## 2025-08-27 RX ORDER — ATENOLOL 50 MG/1
50 TABLET ORAL NIGHTLY
Status: DISCONTINUED | OUTPATIENT
Start: 2025-08-27 | End: 2025-08-29 | Stop reason: HOSPADM

## 2025-08-27 RX ORDER — FINASTERIDE 5 MG/1
5 TABLET, FILM COATED ORAL DAILY
Status: DISCONTINUED | OUTPATIENT
Start: 2025-08-27 | End: 2025-08-29 | Stop reason: HOSPADM

## 2025-08-27 RX ORDER — HYDROMORPHONE HYDROCHLORIDE 1 MG/ML
0.25 INJECTION, SOLUTION INTRAMUSCULAR; INTRAVENOUS; SUBCUTANEOUS
Status: DISCONTINUED | OUTPATIENT
Start: 2025-08-27 | End: 2025-08-29 | Stop reason: HOSPADM

## 2025-08-27 RX ORDER — VITAMIN B COMPLEX
2500 TABLET ORAL DAILY
Status: DISCONTINUED | OUTPATIENT
Start: 2025-08-27 | End: 2025-08-29 | Stop reason: HOSPADM

## 2025-08-27 RX ORDER — SODIUM CHLORIDE 9 MG/ML
INJECTION, SOLUTION INTRAVENOUS PRN
Status: DISCONTINUED | OUTPATIENT
Start: 2025-08-27 | End: 2025-08-29 | Stop reason: HOSPADM

## 2025-08-27 RX ORDER — CYCLOBENZAPRINE HCL 10 MG
10 TABLET ORAL EVERY 12 HOURS PRN
Status: DISCONTINUED | OUTPATIENT
Start: 2025-08-27 | End: 2025-08-29 | Stop reason: HOSPADM

## 2025-08-27 RX ORDER — ONDANSETRON 2 MG/ML
4 INJECTION INTRAMUSCULAR; INTRAVENOUS
Status: DISCONTINUED | OUTPATIENT
Start: 2025-08-27 | End: 2025-08-27 | Stop reason: HOSPADM

## 2025-08-27 RX ADMIN — WATER 2000 MG: 1 INJECTION INTRAMUSCULAR; INTRAVENOUS; SUBCUTANEOUS at 18:29

## 2025-08-27 RX ADMIN — SODIUM CHLORIDE: 0.9 INJECTION, SOLUTION INTRAVENOUS at 14:14

## 2025-08-27 RX ADMIN — MEROPENEM 1000 MG: 1 INJECTION INTRAVENOUS at 14:22

## 2025-08-27 RX ADMIN — SODIUM CHLORIDE, SODIUM LACTATE, POTASSIUM CHLORIDE, AND CALCIUM CHLORIDE: 600; 310; 30; 20 INJECTION, SOLUTION INTRAVENOUS at 10:08

## 2025-08-27 RX ADMIN — LIDOCAINE HYDROCHLORIDE 50 MG: 10 INJECTION, SOLUTION INFILTRATION; PERINEURAL at 10:06

## 2025-08-27 RX ADMIN — ACETAMINOPHEN 650 MG: 325 TABLET ORAL at 14:11

## 2025-08-27 RX ADMIN — FENTANYL CITRATE 50 MCG: 0.05 INJECTION, SOLUTION INTRAMUSCULAR; INTRAVENOUS at 10:05

## 2025-08-27 RX ADMIN — FENTANYL CITRATE 50 MCG: 50 INJECTION INTRAMUSCULAR; INTRAVENOUS at 09:34

## 2025-08-27 RX ADMIN — SODIUM CHLORIDE, SODIUM LACTATE, POTASSIUM CHLORIDE, AND CALCIUM CHLORIDE: .6; .31; .03; .02 INJECTION, SOLUTION INTRAVENOUS at 08:42

## 2025-08-27 RX ADMIN — METHOCARBAMOL 1000 MG: 100 INJECTION INTRAMUSCULAR; INTRAVENOUS at 12:05

## 2025-08-27 RX ADMIN — ACETAMINOPHEN 1000 MG: 500 TABLET ORAL at 08:18

## 2025-08-27 RX ADMIN — TRANEXAMIC ACID 1300 MG: 650 TABLET ORAL at 21:26

## 2025-08-27 RX ADMIN — ASPIRIN 81 MG: 81 TABLET, COATED ORAL at 21:27

## 2025-08-27 RX ADMIN — Medication 2500 UNITS: at 14:11

## 2025-08-27 RX ADMIN — POLYETHYLENE GLYCOL 3350 17 G: 17 POWDER, FOR SOLUTION ORAL at 14:13

## 2025-08-27 RX ADMIN — TAMSULOSIN HYDROCHLORIDE 0.4 MG: 0.4 CAPSULE ORAL at 21:27

## 2025-08-27 RX ADMIN — BISACODYL 5 MG: 5 TABLET, COATED ORAL at 14:12

## 2025-08-27 RX ADMIN — PREGABALIN 75 MG: 75 CAPSULE ORAL at 08:18

## 2025-08-27 RX ADMIN — ROCURONIUM BROMIDE 50 MG: 10 INJECTION, SOLUTION INTRAVENOUS at 10:09

## 2025-08-27 RX ADMIN — SODIUM CHLORIDE, PRESERVATIVE FREE 10 ML: 5 INJECTION INTRAVENOUS at 21:27

## 2025-08-27 RX ADMIN — LEVOTHYROXINE SODIUM 50 MCG: 0.05 TABLET ORAL at 14:12

## 2025-08-27 RX ADMIN — SUGAMMADEX 200 MG: 100 INJECTION, SOLUTION INTRAVENOUS at 11:18

## 2025-08-27 RX ADMIN — Medication 2000 UNITS: at 14:20

## 2025-08-27 RX ADMIN — INSULIN GLARGINE 25 UNITS: 100 INJECTION, SOLUTION SUBCUTANEOUS at 21:26

## 2025-08-27 RX ADMIN — CELECOXIB 200 MG: 200 CAPSULE ORAL at 08:18

## 2025-08-27 RX ADMIN — CEFAZOLIN 2000 MG: 1 INJECTION, POWDER, FOR SOLUTION INTRAMUSCULAR; INTRAVENOUS at 10:19

## 2025-08-27 RX ADMIN — FINASTERIDE 5 MG: 5 TABLET, FILM COATED ORAL at 14:11

## 2025-08-27 RX ADMIN — MEROPENEM 1000 MG: 1 INJECTION INTRAVENOUS at 21:21

## 2025-08-27 RX ADMIN — ATENOLOL 50 MG: 50 TABLET ORAL at 21:27

## 2025-08-27 RX ADMIN — TRANEXAMIC ACID 1000 MG: 100 INJECTION, SOLUTION INTRAVENOUS at 10:19

## 2025-08-27 RX ADMIN — OXYCODONE HYDROCHLORIDE 10 MG: 10 TABLET, FILM COATED, EXTENDED RELEASE ORAL at 08:18

## 2025-08-27 RX ADMIN — PREGABALIN 50 MG: 50 CAPSULE ORAL at 14:12

## 2025-08-27 RX ADMIN — ATORVASTATIN CALCIUM 20 MG: 20 TABLET, FILM COATED ORAL at 21:27

## 2025-08-27 RX ADMIN — ERTAPENEM SODIUM 1000 MG: 1 INJECTION INTRAMUSCULAR; INTRAVENOUS at 08:46

## 2025-08-27 RX ADMIN — SODIUM CHLORIDE, PRESERVATIVE FREE 20 MG: 5 INJECTION INTRAVENOUS at 08:41

## 2025-08-27 RX ADMIN — ROPIVACAINE HYDROCHLORIDE 20 ML: 5 INJECTION, SOLUTION EPIDURAL; INFILTRATION; PERINEURAL at 09:36

## 2025-08-27 RX ADMIN — ACETAMINOPHEN 650 MG: 325 TABLET ORAL at 21:26

## 2025-08-27 RX ADMIN — CALCIUM 500 MG: 500 TABLET ORAL at 14:13

## 2025-08-27 RX ADMIN — FENTANYL CITRATE 100 MCG: 0.05 INJECTION, SOLUTION INTRAMUSCULAR; INTRAVENOUS at 10:28

## 2025-08-27 RX ADMIN — DEXAMETHASONE SODIUM PHOSPHATE 10 MG: 10 INJECTION, SOLUTION INTRAMUSCULAR; INTRAVENOUS at 10:16

## 2025-08-27 RX ADMIN — PREGABALIN 50 MG: 50 CAPSULE ORAL at 21:27

## 2025-08-27 RX ADMIN — METFORMIN HYDROCHLORIDE 1000 MG: 500 TABLET ORAL at 16:57

## 2025-08-27 RX ADMIN — TAMSULOSIN HYDROCHLORIDE 0.4 MG: 0.4 CAPSULE ORAL at 14:11

## 2025-08-27 RX ADMIN — ONDANSETRON 4 MG: 2 INJECTION INTRAMUSCULAR; INTRAVENOUS at 11:40

## 2025-08-27 RX ADMIN — FENTANYL CITRATE 100 MCG: 0.05 INJECTION, SOLUTION INTRAMUSCULAR; INTRAVENOUS at 10:43

## 2025-08-27 RX ADMIN — PROPOFOL 70 MG: 10 INJECTION, EMULSION INTRAVENOUS at 10:08

## 2025-08-27 RX ADMIN — TRANEXAMIC ACID 1000 MG: 100 INJECTION, SOLUTION INTRAVENOUS at 11:13

## 2025-08-27 RX ADMIN — PROPOFOL 30 MG: 10 INJECTION, EMULSION INTRAVENOUS at 10:14

## 2025-08-27 ASSESSMENT — PAIN SCALES - GENERAL
PAINLEVEL_OUTOF10: 3
PAINLEVEL_OUTOF10: 0

## 2025-08-27 ASSESSMENT — PAIN DESCRIPTION - LOCATION: LOCATION: KNEE

## 2025-08-27 ASSESSMENT — PAIN - FUNCTIONAL ASSESSMENT
PAIN_FUNCTIONAL_ASSESSMENT: 0-10
PAIN_FUNCTIONAL_ASSESSMENT: 0-10
PAIN_FUNCTIONAL_ASSESSMENT: PREVENTS OR INTERFERES SOME ACTIVE ACTIVITIES AND ADLS

## 2025-08-27 ASSESSMENT — PAIN DESCRIPTION - ORIENTATION: ORIENTATION: LEFT

## 2025-08-27 ASSESSMENT — PAIN DESCRIPTION - PAIN TYPE: TYPE: SURGICAL PAIN;ACUTE PAIN

## 2025-08-27 ASSESSMENT — PAIN DESCRIPTION - FREQUENCY: FREQUENCY: CONTINUOUS

## 2025-08-27 ASSESSMENT — LIFESTYLE VARIABLES: SMOKING_STATUS: 0

## 2025-08-27 ASSESSMENT — PAIN DESCRIPTION - DESCRIPTORS: DESCRIPTORS: ACHING;PRESSURE

## 2025-08-28 PROBLEM — Z79.4 TYPE 2 DIABETES MELLITUS WITH HYPERGLYCEMIA, WITH LONG-TERM CURRENT USE OF INSULIN (HCC): Status: ACTIVE | Noted: 2025-08-28

## 2025-08-28 PROBLEM — K59.03 DRUG-INDUCED CONSTIPATION: Status: ACTIVE | Noted: 2025-08-28

## 2025-08-28 PROBLEM — I10 PRIMARY HYPERTENSION: Status: ACTIVE | Noted: 2025-08-28

## 2025-08-28 PROBLEM — E11.65 TYPE 2 DIABETES MELLITUS WITH HYPERGLYCEMIA, WITH LONG-TERM CURRENT USE OF INSULIN (HCC): Status: ACTIVE | Noted: 2025-08-28

## 2025-08-28 LAB
ANION GAP SERPL CALCULATED.3IONS-SCNC: 11 MMOL/L (ref 8–16)
BUN SERPL-MCNC: 18 MG/DL (ref 8–23)
CALCIUM SERPL-MCNC: 8.9 MG/DL (ref 8.8–10.2)
CHLORIDE SERPL-SCNC: 102 MMOL/L (ref 98–107)
CO2 SERPL-SCNC: 21 MMOL/L (ref 22–29)
CREAT SERPL-MCNC: 1 MG/DL (ref 0.7–1.2)
GLUCOSE BLD-MCNC: 190 MG/DL (ref 70–99)
GLUCOSE BLD-MCNC: 221 MG/DL (ref 70–99)
GLUCOSE BLD-MCNC: 229 MG/DL (ref 70–99)
GLUCOSE SERPL-MCNC: 222 MG/DL (ref 70–99)
HCT VFR BLD AUTO: 35.3 % (ref 42–52)
HGB BLD-MCNC: 11.8 G/DL (ref 14–18)
PERFORMED ON: ABNORMAL
POTASSIUM SERPL-SCNC: 4.3 MMOL/L (ref 3.5–5)
SODIUM SERPL-SCNC: 134 MMOL/L (ref 136–145)

## 2025-08-28 PROCEDURE — 94150 VITAL CAPACITY TEST: CPT

## 2025-08-28 PROCEDURE — 2500000003 HC RX 250 WO HCPCS: Performed by: ORTHOPAEDIC SURGERY

## 2025-08-28 PROCEDURE — 82962 GLUCOSE BLOOD TEST: CPT

## 2025-08-28 PROCEDURE — 97530 THERAPEUTIC ACTIVITIES: CPT

## 2025-08-28 PROCEDURE — 97165 OT EVAL LOW COMPLEX 30 MIN: CPT

## 2025-08-28 PROCEDURE — G0378 HOSPITAL OBSERVATION PER HR: HCPCS

## 2025-08-28 PROCEDURE — 6360000002 HC RX W HCPCS: Performed by: ORTHOPAEDIC SURGERY

## 2025-08-28 PROCEDURE — 94760 N-INVAS EAR/PLS OXIMETRY 1: CPT

## 2025-08-28 PROCEDURE — 85014 HEMATOCRIT: CPT

## 2025-08-28 PROCEDURE — 97535 SELF CARE MNGMENT TRAINING: CPT

## 2025-08-28 PROCEDURE — 6370000000 HC RX 637 (ALT 250 FOR IP): Performed by: FAMILY MEDICINE

## 2025-08-28 PROCEDURE — 97116 GAIT TRAINING THERAPY: CPT

## 2025-08-28 PROCEDURE — 85018 HEMOGLOBIN: CPT

## 2025-08-28 PROCEDURE — 80048 BASIC METABOLIC PNL TOTAL CA: CPT

## 2025-08-28 PROCEDURE — 2580000003 HC RX 258: Performed by: ORTHOPAEDIC SURGERY

## 2025-08-28 PROCEDURE — 6370000000 HC RX 637 (ALT 250 FOR IP): Performed by: ORTHOPAEDIC SURGERY

## 2025-08-28 PROCEDURE — 36415 COLL VENOUS BLD VENIPUNCTURE: CPT

## 2025-08-28 PROCEDURE — 97161 PT EVAL LOW COMPLEX 20 MIN: CPT

## 2025-08-28 PROCEDURE — 99024 POSTOP FOLLOW-UP VISIT: CPT

## 2025-08-28 RX ORDER — DEXTROSE MONOHYDRATE 100 MG/ML
INJECTION, SOLUTION INTRAVENOUS CONTINUOUS PRN
Status: DISCONTINUED | OUTPATIENT
Start: 2025-08-28 | End: 2025-08-29 | Stop reason: HOSPADM

## 2025-08-28 RX ORDER — BISMUTH SUBSALICYLATE 262 MG/1
524 TABLET, CHEWABLE ORAL DAILY PRN
Status: DISCONTINUED | OUTPATIENT
Start: 2025-08-28 | End: 2025-08-29 | Stop reason: HOSPADM

## 2025-08-28 RX ORDER — POLYETHYLENE GLYCOL 3350 17 G/17G
17 POWDER, FOR SOLUTION ORAL 2 TIMES DAILY
Status: DISCONTINUED | OUTPATIENT
Start: 2025-08-28 | End: 2025-08-29 | Stop reason: HOSPADM

## 2025-08-28 RX ORDER — INSULIN LISPRO 100 [IU]/ML
0-8 INJECTION, SOLUTION INTRAVENOUS; SUBCUTANEOUS EVERY 6 HOURS SCHEDULED
Status: DISCONTINUED | OUTPATIENT
Start: 2025-08-28 | End: 2025-08-29 | Stop reason: HOSPADM

## 2025-08-28 RX ORDER — GLUCAGON 1 MG/ML
1 KIT INJECTION PRN
Status: DISCONTINUED | OUTPATIENT
Start: 2025-08-28 | End: 2025-08-29 | Stop reason: HOSPADM

## 2025-08-28 RX ADMIN — METFORMIN HYDROCHLORIDE 1000 MG: 500 TABLET ORAL at 17:20

## 2025-08-28 RX ADMIN — Medication 2000 UNITS: at 08:22

## 2025-08-28 RX ADMIN — MEROPENEM 1000 MG: 1 INJECTION INTRAVENOUS at 14:14

## 2025-08-28 RX ADMIN — TAMSULOSIN HYDROCHLORIDE 0.4 MG: 0.4 CAPSULE ORAL at 08:22

## 2025-08-28 RX ADMIN — PREGABALIN 50 MG: 50 CAPSULE ORAL at 22:11

## 2025-08-28 RX ADMIN — WATER 2000 MG: 1 INJECTION INTRAMUSCULAR; INTRAVENOUS; SUBCUTANEOUS at 10:26

## 2025-08-28 RX ADMIN — PREGABALIN 50 MG: 50 CAPSULE ORAL at 08:22

## 2025-08-28 RX ADMIN — INSULIN LISPRO 2 UNITS: 100 INJECTION, SOLUTION INTRAVENOUS; SUBCUTANEOUS at 17:20

## 2025-08-28 RX ADMIN — ATENOLOL 50 MG: 50 TABLET ORAL at 22:18

## 2025-08-28 RX ADMIN — LEVOTHYROXINE SODIUM 50 MCG: 0.05 TABLET ORAL at 06:04

## 2025-08-28 RX ADMIN — BISACODYL 5 MG: 5 TABLET, COATED ORAL at 08:22

## 2025-08-28 RX ADMIN — WATER 2000 MG: 1 INJECTION INTRAMUSCULAR; INTRAVENOUS; SUBCUTANEOUS at 17:20

## 2025-08-28 RX ADMIN — SODIUM CHLORIDE, PRESERVATIVE FREE 10 ML: 5 INJECTION INTRAVENOUS at 08:24

## 2025-08-28 RX ADMIN — FINASTERIDE 5 MG: 5 TABLET, FILM COATED ORAL at 08:22

## 2025-08-28 RX ADMIN — TAMSULOSIN HYDROCHLORIDE 0.4 MG: 0.4 CAPSULE ORAL at 22:12

## 2025-08-28 RX ADMIN — ACETAMINOPHEN 650 MG: 325 TABLET ORAL at 14:12

## 2025-08-28 RX ADMIN — ATORVASTATIN CALCIUM 20 MG: 20 TABLET, FILM COATED ORAL at 22:12

## 2025-08-28 RX ADMIN — TRANEXAMIC ACID 1300 MG: 650 TABLET ORAL at 22:12

## 2025-08-28 RX ADMIN — MEROPENEM 1000 MG: 1 INJECTION INTRAVENOUS at 06:04

## 2025-08-28 RX ADMIN — ASPIRIN 81 MG: 81 TABLET, COATED ORAL at 22:12

## 2025-08-28 RX ADMIN — INSULIN GLARGINE 25 UNITS: 100 INJECTION, SOLUTION SUBCUTANEOUS at 22:11

## 2025-08-28 RX ADMIN — ACETAMINOPHEN 650 MG: 325 TABLET ORAL at 03:44

## 2025-08-28 RX ADMIN — WATER 2000 MG: 1 INJECTION INTRAMUSCULAR; INTRAVENOUS; SUBCUTANEOUS at 03:44

## 2025-08-28 RX ADMIN — ACETAMINOPHEN 650 MG: 325 TABLET ORAL at 08:22

## 2025-08-28 RX ADMIN — BISMUTH SUBSALICYLATE 524 MG: 262 TABLET, CHEWABLE ORAL at 22:12

## 2025-08-28 RX ADMIN — INSULIN LISPRO 2 UNITS: 100 INJECTION, SOLUTION INTRAVENOUS; SUBCUTANEOUS at 11:39

## 2025-08-28 RX ADMIN — SODIUM CHLORIDE, PRESERVATIVE FREE 10 ML: 5 INJECTION INTRAVENOUS at 22:20

## 2025-08-28 RX ADMIN — ACETAMINOPHEN 650 MG: 325 TABLET ORAL at 22:12

## 2025-08-28 RX ADMIN — METFORMIN HYDROCHLORIDE 1000 MG: 500 TABLET ORAL at 08:22

## 2025-08-28 RX ADMIN — POLYETHYLENE GLYCOL 3350 17 G: 17 POWDER, FOR SOLUTION ORAL at 08:22

## 2025-08-28 RX ADMIN — CALCIUM 500 MG: 500 TABLET ORAL at 08:22

## 2025-08-28 RX ADMIN — ASPIRIN 81 MG: 81 TABLET, COATED ORAL at 08:22

## 2025-08-28 RX ADMIN — TRANEXAMIC ACID 1300 MG: 650 TABLET ORAL at 08:22

## 2025-08-28 RX ADMIN — Medication 2500 UNITS: at 11:39

## 2025-08-28 RX ADMIN — INSULIN LISPRO 2 UNITS: 100 INJECTION, SOLUTION INTRAVENOUS; SUBCUTANEOUS at 08:22

## 2025-08-28 ASSESSMENT — PAIN - FUNCTIONAL ASSESSMENT
PAIN_FUNCTIONAL_ASSESSMENT: 0-10
PAIN_FUNCTIONAL_ASSESSMENT: ACTIVITIES ARE NOT PREVENTED
PAIN_FUNCTIONAL_ASSESSMENT: 0-10
PAIN_FUNCTIONAL_ASSESSMENT: 0-10
PAIN_FUNCTIONAL_ASSESSMENT: ACTIVITIES ARE NOT PREVENTED

## 2025-08-28 ASSESSMENT — PAIN SCALES - GENERAL
PAINLEVEL_OUTOF10: 2
PAINLEVEL_OUTOF10: 1
PAINLEVEL_OUTOF10: 2

## 2025-08-28 ASSESSMENT — PAIN DESCRIPTION - DESCRIPTORS
DESCRIPTORS: SORE
DESCRIPTORS: DISCOMFORT

## 2025-08-28 ASSESSMENT — PAIN DESCRIPTION - LOCATION
LOCATION: KNEE
LOCATION: KNEE

## 2025-08-28 ASSESSMENT — PAIN DESCRIPTION - ORIENTATION
ORIENTATION: LEFT
ORIENTATION: LEFT

## 2025-08-29 VITALS
RESPIRATION RATE: 14 BRPM | SYSTOLIC BLOOD PRESSURE: 161 MMHG | HEART RATE: 82 BPM | BODY MASS INDEX: 29.9 KG/M2 | OXYGEN SATURATION: 94 % | HEIGHT: 74 IN | WEIGHT: 233 LBS | TEMPERATURE: 98.5 F | DIASTOLIC BLOOD PRESSURE: 68 MMHG

## 2025-08-29 LAB
ANION GAP SERPL CALCULATED.3IONS-SCNC: 9 MMOL/L (ref 8–16)
BUN SERPL-MCNC: 17 MG/DL (ref 8–23)
CALCIUM SERPL-MCNC: 8.6 MG/DL (ref 8.8–10.2)
CHLORIDE SERPL-SCNC: 103 MMOL/L (ref 98–107)
CO2 SERPL-SCNC: 24 MMOL/L (ref 22–29)
CREAT SERPL-MCNC: 0.9 MG/DL (ref 0.7–1.2)
FOLATE SERPL-MCNC: 4.8 NG/ML (ref 4.5–32.2)
GLUCOSE BLD-MCNC: 130 MG/DL (ref 70–99)
GLUCOSE SERPL-MCNC: 128 MG/DL (ref 70–99)
HBA1C MFR BLD: 7.9 % (ref 4–5.6)
HCT VFR BLD AUTO: 36 % (ref 42–52)
HGB BLD-MCNC: 11.9 G/DL (ref 14–18)
IRON SATN MFR SERPL: 12 % (ref 20–50)
IRON SERPL-MCNC: 24 UG/DL (ref 59–158)
PERFORMED ON: ABNORMAL
POTASSIUM SERPL-SCNC: 3.9 MMOL/L (ref 3.5–5)
SODIUM SERPL-SCNC: 136 MMOL/L (ref 136–145)
TIBC SERPL-MCNC: 205 UG/DL (ref 250–400)
VIT B12 SERPL-MCNC: 289 PG/ML (ref 232–1245)

## 2025-08-29 PROCEDURE — 97530 THERAPEUTIC ACTIVITIES: CPT

## 2025-08-29 PROCEDURE — 2500000003 HC RX 250 WO HCPCS: Performed by: ORTHOPAEDIC SURGERY

## 2025-08-29 PROCEDURE — 6360000002 HC RX W HCPCS: Performed by: ORTHOPAEDIC SURGERY

## 2025-08-29 PROCEDURE — 82607 VITAMIN B-12: CPT

## 2025-08-29 PROCEDURE — 97116 GAIT TRAINING THERAPY: CPT

## 2025-08-29 PROCEDURE — 36415 COLL VENOUS BLD VENIPUNCTURE: CPT

## 2025-08-29 PROCEDURE — 83036 HEMOGLOBIN GLYCOSYLATED A1C: CPT

## 2025-08-29 PROCEDURE — 82746 ASSAY OF FOLIC ACID SERUM: CPT

## 2025-08-29 PROCEDURE — 6370000000 HC RX 637 (ALT 250 FOR IP): Performed by: FAMILY MEDICINE

## 2025-08-29 PROCEDURE — 6370000000 HC RX 637 (ALT 250 FOR IP): Performed by: ORTHOPAEDIC SURGERY

## 2025-08-29 PROCEDURE — 83550 IRON BINDING TEST: CPT

## 2025-08-29 PROCEDURE — 82962 GLUCOSE BLOOD TEST: CPT

## 2025-08-29 PROCEDURE — 80048 BASIC METABOLIC PNL TOTAL CA: CPT

## 2025-08-29 PROCEDURE — 94760 N-INVAS EAR/PLS OXIMETRY 1: CPT

## 2025-08-29 PROCEDURE — G0378 HOSPITAL OBSERVATION PER HR: HCPCS

## 2025-08-29 PROCEDURE — 85014 HEMATOCRIT: CPT

## 2025-08-29 PROCEDURE — 83540 ASSAY OF IRON: CPT

## 2025-08-29 PROCEDURE — 85018 HEMOGLOBIN: CPT

## 2025-08-29 RX ORDER — MUPIROCIN 2 %
OINTMENT (GRAM) TOPICAL 2 TIMES DAILY
Status: DISCONTINUED | OUTPATIENT
Start: 2025-08-29 | End: 2025-08-29 | Stop reason: HOSPADM

## 2025-08-29 RX ADMIN — TRANEXAMIC ACID 1300 MG: 650 TABLET ORAL at 09:30

## 2025-08-29 RX ADMIN — INSULIN LISPRO 2 UNITS: 100 INJECTION, SOLUTION INTRAVENOUS; SUBCUTANEOUS at 09:32

## 2025-08-29 RX ADMIN — PREGABALIN 50 MG: 50 CAPSULE ORAL at 09:30

## 2025-08-29 RX ADMIN — CALCIUM 500 MG: 500 TABLET ORAL at 09:30

## 2025-08-29 RX ADMIN — WATER 2000 MG: 1 INJECTION INTRAMUSCULAR; INTRAVENOUS; SUBCUTANEOUS at 03:08

## 2025-08-29 RX ADMIN — ASPIRIN 81 MG: 81 TABLET, COATED ORAL at 09:30

## 2025-08-29 RX ADMIN — Medication 2000 UNITS: at 09:30

## 2025-08-29 RX ADMIN — LEVOTHYROXINE SODIUM 50 MCG: 0.05 TABLET ORAL at 09:34

## 2025-08-29 RX ADMIN — TAMSULOSIN HYDROCHLORIDE 0.4 MG: 0.4 CAPSULE ORAL at 09:30

## 2025-08-29 RX ADMIN — Medication 2500 UNITS: at 09:31

## 2025-08-29 RX ADMIN — METFORMIN HYDROCHLORIDE 1000 MG: 500 TABLET ORAL at 09:30

## 2025-08-29 RX ADMIN — SODIUM CHLORIDE, PRESERVATIVE FREE 10 ML: 5 INJECTION INTRAVENOUS at 09:32

## 2025-08-29 RX ADMIN — WATER 2000 MG: 1 INJECTION INTRAMUSCULAR; INTRAVENOUS; SUBCUTANEOUS at 09:38

## 2025-08-29 RX ADMIN — ACETAMINOPHEN 650 MG: 325 TABLET ORAL at 09:30

## 2025-08-29 RX ADMIN — FINASTERIDE 5 MG: 5 TABLET, FILM COATED ORAL at 09:30

## 2025-08-29 RX ADMIN — MUPIROCIN: 20 OINTMENT TOPICAL at 09:32

## 2025-08-29 ASSESSMENT — PAIN DESCRIPTION - DESCRIPTORS: DESCRIPTORS: SORE

## 2025-08-29 ASSESSMENT — PAIN SCALES - GENERAL: PAINLEVEL_OUTOF10: 1

## 2025-08-29 ASSESSMENT — ENCOUNTER SYMPTOMS
RESPIRATORY NEGATIVE: 1
EYES NEGATIVE: 1
GASTROINTESTINAL NEGATIVE: 1

## 2025-08-29 ASSESSMENT — PAIN - FUNCTIONAL ASSESSMENT
PAIN_FUNCTIONAL_ASSESSMENT: ACTIVITIES ARE NOT PREVENTED
PAIN_FUNCTIONAL_ASSESSMENT: 0-10

## 2025-08-29 ASSESSMENT — PAIN DESCRIPTION - ORIENTATION: ORIENTATION: LEFT

## 2025-08-29 ASSESSMENT — PAIN DESCRIPTION - LOCATION: LOCATION: KNEE

## 2025-09-02 ENCOUNTER — TELEPHONE (OUTPATIENT)
Dept: INPATIENT UNIT | Age: 89
End: 2025-09-02

## 2025-09-02 ENCOUNTER — TELEPHONE (OUTPATIENT)
Age: 89
End: 2025-09-02

## (undated) DEVICE — ZIMMER® STERILE DISPOSABLE TOURNIQUET CUFF WITH PLC, DUAL PORT, SINGLE BLADDER, 34 IN. (86 CM)

## (undated) DEVICE — GLOVE SURG SZ 85 L12IN FNGR THK79MIL GRN LTX FREE

## (undated) DEVICE — PAD,NON-ADHERENT,3X8,STERILE,LF,1/PK: Brand: MEDLINE

## (undated) DEVICE — E-Z CLEAN, NON-STICK, PTFE COATED, ELECTROSURGICAL BLADE ELECTRODE, MODIFIED EXTENDED INSULATION, 4 INCH (10.2 CM): Brand: MEGADYNE

## (undated) DEVICE — BLANKET THER AD W24XL60IN FAB COVERING SUP SFT ULT THN LTWT

## (undated) DEVICE — PIN FIX STERILE L80MM DIA3.2MM FLUT CAS

## (undated) DEVICE — GLOVE SURG SZ 85 CRM LTX FREE POLYISOPRENE POLYMER BEAD ANTI

## (undated) DEVICE — GOWN,PREVENTION PLUS,XL,ST,24/CS: Brand: MEDLINE

## (undated) DEVICE — SUTURE VICRYL + SZ 1 L18IN ABSRB UD L36MM CT-1 1/2 CIR VCP841D

## (undated) DEVICE — DUAL CUT SAGITTAL BLADE

## (undated) DEVICE — GLOVE SURG SZ 75 CRM LTX FREE POLYISOPRENE POLYMER BEAD ANTI

## (undated) DEVICE — PENCIL SMK EVAC BLADE COAT 70 MM ROCKER-SWITCH NEPTUNE E-SEP

## (undated) DEVICE — BANDAGE COMPR W6INXL10YD ST M E WHITE/BEIGE

## (undated) DEVICE — SUTURE VCRL SZ 1 L18IN ABSRB UD L36MM CT-1 1/2 CIR J841D

## (undated) DEVICE — DRAPE SURG ST 3/4 SHEET W53XL77IN STD POLYPR 3 QTR DISP

## (undated) DEVICE — PIN FIX STERILE L150MM DIA3.2MM FLUT

## (undated) DEVICE — UNDERGLOVE SURG SZ 8 FNGR THK0.21MIL GRN LTX BEAD CUF

## (undated) DEVICE — DRESSING FOAM SELF ADH 20X10 CM ABSORBENT MEPILEX BORDER

## (undated) DEVICE — INSTRUMENT KIT ORTHOPEDIC KNEE NAVITRACK

## (undated) DEVICE — SHEET,DRAPE,53X77,STERILE: Brand: MEDLINE

## (undated) DEVICE — BLADE SAW W18XL90MM THK1.14MM CUT THK1.27MM S STL SAG 2 CUT

## (undated) DEVICE — SHIELD SURG COAX MULT TIP ORIFICE INTERPULSE

## (undated) DEVICE — ADHESIVE SKIN CLOSURE WND 8.661X1.5 IN 22 CM LIQUIBAND SECUR

## (undated) DEVICE — BLADE SAW W25XL90MM THK1.27MM S STL SAG 2 CUT

## (undated) DEVICE — ELECTRODE ELECSURG L 10.2 CM PTFE COAT MONOPOLAR BLADE NSTK

## (undated) DEVICE — DRAPE ROSA RBTC UNT 20 DROP

## (undated) DEVICE — SYSTEM SKIN CLSR 22CM DERMBND PRINEO

## (undated) DEVICE — POUCH INSTR W11XL7IN TRNSPAR PLAS RELIABLE ADH STRP

## (undated) DEVICE — ROYAL SILK SURGICAL GOWN, XXL: Brand: CONVERTORS

## (undated) DEVICE — CUFF TRNQT W4XL34IN 2 PRT SGL BLDR CYL DISP

## (undated) DEVICE — DRESSING FOAM 4X12 IN OPTIFOAM GENTLE POSTOP

## (undated) DEVICE — SUTURE VICRYL + 3-0 L27IN ABSRB UD PS-2 L19MM 3/8 CIR PRIM VCP427H

## (undated) DEVICE — Device

## (undated) DEVICE — SURGICAL PROCEDURE PACK KNEE TOT DBD CDS LOURDES HOSP LF

## (undated) DEVICE — FAN SPRAY KIT: Brand: PULSAVAC®

## (undated) DEVICE — SOLUTION IRRIG 3000ML 0.9% SOD CHL USP UROMATIC PLAS CONT

## (undated) DEVICE — ROSA RBTC UNT 20 DROP

## (undated) DEVICE — NEPTUNE E-SEP SMOKE EVACUATION PENCIL, COATED, 70MM BLADE, ROCKER SWITCH: Brand: NEPTUNE E-SEP

## (undated) DEVICE — 3M™ STERI-DRAPE™ INSTRUMENT POUCH 1018: Brand: STERI-DRAPE™

## (undated) DEVICE — SUTURE VCRL SZ 2-0 L36IN ABSRB UD L36MM CT-1 1/2 CIR J945H

## (undated) DEVICE — SUTURE VICRYL + SZ 2-0 L36IN ABSRB UD L36MM CT-1 1/2 CIR VCP945H

## (undated) DEVICE — SOLUTION WND IRRIGATION 32 OZ 0.24 HYPOCHLOROUS ACD